# Patient Record
Sex: FEMALE | Race: WHITE | Employment: FULL TIME | URBAN - METROPOLITAN AREA
[De-identification: names, ages, dates, MRNs, and addresses within clinical notes are randomized per-mention and may not be internally consistent; named-entity substitution may affect disease eponyms.]

---

## 2017-02-13 ENCOUNTER — OFFICE VISIT (OUTPATIENT)
Dept: FAMILY MEDICINE CLINIC | Age: 42
End: 2017-02-13

## 2017-02-13 VITALS
SYSTOLIC BLOOD PRESSURE: 102 MMHG | OXYGEN SATURATION: 97 % | RESPIRATION RATE: 12 BRPM | HEIGHT: 68 IN | DIASTOLIC BLOOD PRESSURE: 60 MMHG | TEMPERATURE: 97.9 F | WEIGHT: 198 LBS | HEART RATE: 76 BPM | BODY MASS INDEX: 30.01 KG/M2

## 2017-02-13 DIAGNOSIS — J01.90 ACUTE RHINOSINUSITIS: Primary | ICD-10-CM

## 2017-02-13 DIAGNOSIS — J40 BRONCHITIS: ICD-10-CM

## 2017-02-13 PROCEDURE — 1036F TOBACCO NON-USER: CPT | Performed by: FAMILY MEDICINE

## 2017-02-13 PROCEDURE — G8484 FLU IMMUNIZE NO ADMIN: HCPCS | Performed by: FAMILY MEDICINE

## 2017-02-13 PROCEDURE — G8417 CALC BMI ABV UP PARAM F/U: HCPCS | Performed by: FAMILY MEDICINE

## 2017-02-13 PROCEDURE — 99213 OFFICE O/P EST LOW 20 MIN: CPT | Performed by: FAMILY MEDICINE

## 2017-02-13 PROCEDURE — G8427 DOCREV CUR MEDS BY ELIG CLIN: HCPCS | Performed by: FAMILY MEDICINE

## 2017-02-13 RX ORDER — DOXYCYCLINE HYCLATE 100 MG
100 TABLET ORAL 2 TIMES DAILY
Qty: 14 TABLET | Refills: 0 | Status: SHIPPED | OUTPATIENT
Start: 2017-02-13 | End: 2017-02-20

## 2017-02-13 RX ORDER — HYDROCODONE POLISTIREX AND CHLORPHENIRAMINE POLISTIREX 10; 8 MG/5ML; MG/5ML
5 SUSPENSION, EXTENDED RELEASE ORAL EVERY 12 HOURS PRN
Qty: 115 ML | Refills: 0 | Status: SHIPPED | OUTPATIENT
Start: 2017-02-13 | End: 2017-09-25 | Stop reason: ALTCHOICE

## 2017-02-13 ASSESSMENT — ENCOUNTER SYMPTOMS
SINUS PRESSURE: 1
DIARRHEA: 0
VOMITING: 0
NAUSEA: 0
VOICE CHANGE: 1
WHEEZING: 0
RHINORRHEA: 1
SHORTNESS OF BREATH: 0
COUGH: 1
SORE THROAT: 1
GASTROINTESTINAL NEGATIVE: 1

## 2017-03-09 ENCOUNTER — OFFICE VISIT (OUTPATIENT)
Dept: FAMILY MEDICINE CLINIC | Age: 42
End: 2017-03-09

## 2017-03-09 VITALS
DIASTOLIC BLOOD PRESSURE: 64 MMHG | RESPIRATION RATE: 12 BRPM | WEIGHT: 200.2 LBS | HEART RATE: 68 BPM | HEIGHT: 68 IN | SYSTOLIC BLOOD PRESSURE: 112 MMHG | BODY MASS INDEX: 30.34 KG/M2

## 2017-03-09 DIAGNOSIS — E03.9 HYPOTHYROIDISM, UNSPECIFIED TYPE: ICD-10-CM

## 2017-03-09 DIAGNOSIS — H35.00 RETINOPATHY OF LEFT EYE: ICD-10-CM

## 2017-03-09 DIAGNOSIS — M25.512 LEFT SHOULDER PAIN, UNSPECIFIED CHRONICITY: ICD-10-CM

## 2017-03-09 DIAGNOSIS — R80.9 MICROALBUMINURIA: ICD-10-CM

## 2017-03-09 DIAGNOSIS — K59.09 CHRONIC CONSTIPATION: ICD-10-CM

## 2017-03-09 DIAGNOSIS — R11.0 NAUSEA: ICD-10-CM

## 2017-03-09 PROCEDURE — G8427 DOCREV CUR MEDS BY ELIG CLIN: HCPCS | Performed by: FAMILY MEDICINE

## 2017-03-09 PROCEDURE — 3045F PR MOST RECENT HEMOGLOBIN A1C LEVEL 7.0-9.0%: CPT | Performed by: FAMILY MEDICINE

## 2017-03-09 PROCEDURE — G8417 CALC BMI ABV UP PARAM F/U: HCPCS | Performed by: FAMILY MEDICINE

## 2017-03-09 PROCEDURE — 99214 OFFICE O/P EST MOD 30 MIN: CPT | Performed by: FAMILY MEDICINE

## 2017-03-09 PROCEDURE — 1036F TOBACCO NON-USER: CPT | Performed by: FAMILY MEDICINE

## 2017-03-09 PROCEDURE — G8484 FLU IMMUNIZE NO ADMIN: HCPCS | Performed by: FAMILY MEDICINE

## 2017-03-09 RX ORDER — ONDANSETRON 4 MG/1
4 TABLET, FILM COATED ORAL EVERY 8 HOURS PRN
Qty: 30 TABLET | Refills: 0 | Status: SHIPPED | OUTPATIENT
Start: 2017-03-09 | End: 2017-09-25 | Stop reason: ALTCHOICE

## 2017-03-09 RX ORDER — CELECOXIB 200 MG/1
200 CAPSULE ORAL DAILY
Qty: 30 CAPSULE | Refills: 11 | Status: SHIPPED | OUTPATIENT
Start: 2017-03-09 | End: 2018-12-06 | Stop reason: ALTCHOICE

## 2017-03-09 RX ORDER — SODIUM CHLORIDE/ALOE VERA
GEL (GRAM) NASAL PRN
Qty: 1 TUBE | Refills: 3 | Status: SHIPPED | OUTPATIENT
Start: 2017-03-09 | End: 2018-01-09

## 2017-03-09 ASSESSMENT — ENCOUNTER SYMPTOMS
GASTROINTESTINAL NEGATIVE: 1
RESPIRATORY NEGATIVE: 1

## 2017-06-07 ENCOUNTER — TELEPHONE (OUTPATIENT)
Dept: FAMILY MEDICINE CLINIC | Age: 42
End: 2017-06-07

## 2017-06-09 LAB
AVERAGE GLUCOSE: 186 MG/DL (ref 66–114)
HBA1C MFR BLD: 8.1 % (ref 4.2–5.8)

## 2017-06-12 ENCOUNTER — OFFICE VISIT (OUTPATIENT)
Dept: FAMILY MEDICINE CLINIC | Age: 42
End: 2017-06-12

## 2017-06-12 VITALS
RESPIRATION RATE: 12 BRPM | BODY MASS INDEX: 30.92 KG/M2 | SYSTOLIC BLOOD PRESSURE: 128 MMHG | WEIGHT: 204 LBS | HEIGHT: 68 IN | HEART RATE: 68 BPM | DIASTOLIC BLOOD PRESSURE: 74 MMHG

## 2017-06-12 DIAGNOSIS — E03.8 OTHER SPECIFIED HYPOTHYROIDISM: ICD-10-CM

## 2017-06-12 DIAGNOSIS — R80.9 MICROALBUMINURIA: ICD-10-CM

## 2017-06-12 DIAGNOSIS — E66.9 OBESITY (BMI 30-39.9): ICD-10-CM

## 2017-06-12 PROCEDURE — 99214 OFFICE O/P EST MOD 30 MIN: CPT | Performed by: FAMILY MEDICINE

## 2017-06-12 PROCEDURE — 3046F HEMOGLOBIN A1C LEVEL >9.0%: CPT | Performed by: FAMILY MEDICINE

## 2017-06-12 PROCEDURE — G8417 CALC BMI ABV UP PARAM F/U: HCPCS | Performed by: FAMILY MEDICINE

## 2017-06-12 PROCEDURE — G8427 DOCREV CUR MEDS BY ELIG CLIN: HCPCS | Performed by: FAMILY MEDICINE

## 2017-06-12 PROCEDURE — 1036F TOBACCO NON-USER: CPT | Performed by: FAMILY MEDICINE

## 2017-06-12 ASSESSMENT — PATIENT HEALTH QUESTIONNAIRE - PHQ9
SUM OF ALL RESPONSES TO PHQ QUESTIONS 1-9: 0
2. FEELING DOWN, DEPRESSED OR HOPELESS: 0
1. LITTLE INTEREST OR PLEASURE IN DOING THINGS: 0
SUM OF ALL RESPONSES TO PHQ9 QUESTIONS 1 & 2: 0

## 2017-06-12 ASSESSMENT — ENCOUNTER SYMPTOMS
RESPIRATORY NEGATIVE: 1
GASTROINTESTINAL NEGATIVE: 1

## 2017-06-15 ENCOUNTER — OFFICE VISIT (OUTPATIENT)
Dept: OTHER | Age: 42
End: 2017-06-15

## 2017-06-15 VITALS — WEIGHT: 203 LBS | BODY MASS INDEX: 30.87 KG/M2

## 2017-06-15 DIAGNOSIS — E10.3393 TYPE 1 DIABETES MELLITUS WITH MODERATE NONPROLIFERATIVE RETINOPATHY OF BOTH EYES WITHOUT MACULAR EDEMA (HCC): Primary | ICD-10-CM

## 2017-09-06 ENCOUNTER — TELEPHONE (OUTPATIENT)
Dept: FAMILY MEDICINE CLINIC | Age: 42
End: 2017-09-06

## 2017-09-19 ENCOUNTER — TELEPHONE (OUTPATIENT)
Dept: FAMILY MEDICINE CLINIC | Age: 42
End: 2017-09-19

## 2017-09-23 LAB
AVERAGE GLUCOSE: 166 MG/DL (ref 66–114)
HBA1C MFR BLD: 7.4 % (ref 4.2–5.8)

## 2017-09-25 ENCOUNTER — OFFICE VISIT (OUTPATIENT)
Dept: FAMILY MEDICINE CLINIC | Age: 42
End: 2017-09-25
Payer: COMMERCIAL

## 2017-09-25 VITALS
WEIGHT: 202.8 LBS | SYSTOLIC BLOOD PRESSURE: 120 MMHG | HEART RATE: 91 BPM | DIASTOLIC BLOOD PRESSURE: 74 MMHG | BODY MASS INDEX: 30.74 KG/M2 | OXYGEN SATURATION: 99 % | HEIGHT: 68 IN | RESPIRATION RATE: 13 BRPM

## 2017-09-25 DIAGNOSIS — R80.9 MICROALBUMINURIA: ICD-10-CM

## 2017-09-25 DIAGNOSIS — Z23 NEED FOR INFLUENZA VACCINATION: ICD-10-CM

## 2017-09-25 DIAGNOSIS — K59.09 CHRONIC CONSTIPATION: ICD-10-CM

## 2017-09-25 DIAGNOSIS — E03.9 HYPOTHYROIDISM, UNSPECIFIED TYPE: ICD-10-CM

## 2017-09-25 PROCEDURE — 1036F TOBACCO NON-USER: CPT | Performed by: FAMILY MEDICINE

## 2017-09-25 PROCEDURE — 3046F HEMOGLOBIN A1C LEVEL >9.0%: CPT | Performed by: FAMILY MEDICINE

## 2017-09-25 PROCEDURE — 99214 OFFICE O/P EST MOD 30 MIN: CPT | Performed by: FAMILY MEDICINE

## 2017-09-25 PROCEDURE — G8417 CALC BMI ABV UP PARAM F/U: HCPCS | Performed by: FAMILY MEDICINE

## 2017-09-25 PROCEDURE — G8427 DOCREV CUR MEDS BY ELIG CLIN: HCPCS | Performed by: FAMILY MEDICINE

## 2017-09-25 PROCEDURE — 90688 IIV4 VACCINE SPLT 0.5 ML IM: CPT | Performed by: FAMILY MEDICINE

## 2017-09-25 PROCEDURE — 90471 IMMUNIZATION ADMIN: CPT | Performed by: FAMILY MEDICINE

## 2017-09-25 RX ORDER — LISINOPRIL 2.5 MG/1
2.5 TABLET ORAL DAILY
Qty: 90 TABLET | Refills: 3 | Status: SHIPPED | OUTPATIENT
Start: 2017-09-25 | End: 2018-12-06 | Stop reason: SDUPTHER

## 2017-09-25 ASSESSMENT — ENCOUNTER SYMPTOMS
RESPIRATORY NEGATIVE: 1
GASTROINTESTINAL NEGATIVE: 1

## 2017-09-28 ENCOUNTER — HOSPITAL ENCOUNTER (OUTPATIENT)
Dept: PHARMACY | Age: 42
Setting detail: THERAPIES SERIES
Discharge: HOME | End: 2017-09-28
Payer: COMMERCIAL

## 2017-09-28 VITALS — BODY MASS INDEX: 30.79 KG/M2 | WEIGHT: 203.13 LBS

## 2017-09-28 PROCEDURE — G0108 DIAB MANAGE TRN  PER INDIV: HCPCS | Performed by: REGISTERED NURSE

## 2017-12-19 ENCOUNTER — TELEPHONE (OUTPATIENT)
Dept: FAMILY MEDICINE CLINIC | Age: 42
End: 2017-12-19

## 2018-01-03 LAB
AVERAGE GLUCOSE: 189 MG/DL (ref 66–114)
CREATINE, URINE: 133.1 MG/DL
HBA1C MFR BLD: 8.2 % (ref 4.2–5.8)
MICROALBUMIN/CREAT 24H UR: <0.7 MG/DL
T4 FREE: 0.91 NG/DL (ref 0.8–1.8)
TSH SERPL DL<=0.05 MIU/L-ACNC: 5.39 UIU/ML (ref 0.4–4.4)

## 2018-01-09 ENCOUNTER — OFFICE VISIT (OUTPATIENT)
Dept: FAMILY MEDICINE CLINIC | Age: 43
End: 2018-01-09
Payer: COMMERCIAL

## 2018-01-09 VITALS
RESPIRATION RATE: 14 BRPM | BODY MASS INDEX: 30.92 KG/M2 | OXYGEN SATURATION: 98 % | HEIGHT: 68 IN | WEIGHT: 204 LBS | SYSTOLIC BLOOD PRESSURE: 114 MMHG | HEART RATE: 86 BPM | DIASTOLIC BLOOD PRESSURE: 60 MMHG

## 2018-01-09 DIAGNOSIS — E03.9 HYPOTHYROIDISM, UNSPECIFIED TYPE: ICD-10-CM

## 2018-01-09 DIAGNOSIS — E66.9 OBESITY (BMI 30-39.9): ICD-10-CM

## 2018-01-09 PROCEDURE — 99214 OFFICE O/P EST MOD 30 MIN: CPT | Performed by: FAMILY MEDICINE

## 2018-01-09 PROCEDURE — 1036F TOBACCO NON-USER: CPT | Performed by: FAMILY MEDICINE

## 2018-01-09 PROCEDURE — G8484 FLU IMMUNIZE NO ADMIN: HCPCS | Performed by: FAMILY MEDICINE

## 2018-01-09 PROCEDURE — 3045F PR MOST RECENT HEMOGLOBIN A1C LEVEL 7.0-9.0%: CPT | Performed by: FAMILY MEDICINE

## 2018-01-09 PROCEDURE — G8427 DOCREV CUR MEDS BY ELIG CLIN: HCPCS | Performed by: FAMILY MEDICINE

## 2018-01-09 PROCEDURE — G8417 CALC BMI ABV UP PARAM F/U: HCPCS | Performed by: FAMILY MEDICINE

## 2018-01-09 RX ORDER — LEVOTHYROXINE SODIUM 200 MCG
200 TABLET ORAL DAILY
Qty: 30 TABLET | Refills: 2 | Status: SHIPPED | OUTPATIENT
Start: 2018-01-09 | End: 2019-06-07 | Stop reason: SDUPTHER

## 2018-01-09 ASSESSMENT — ENCOUNTER SYMPTOMS
GASTROINTESTINAL NEGATIVE: 1
RESPIRATORY NEGATIVE: 1

## 2018-01-09 NOTE — PROGRESS NOTES
Subjective:      Patient ID: Roger Neely is a 38 y.o. female. HPI:    Chief Complaint   Patient presents with    3 Month Follow-Up     DM     3 month eval.    Doing well overall. Sugars not well controlled. Lab Results   Component Value Date    LABA1C 8.2 (H) 2018     BP well controlled. BP Readings from Last 3 Encounters:   18 114/60   17 120/74   17 128/74     Weight is up a few lbs. Wt Readings from Last 3 Encounters:   18 204 lb (92.5 kg)   17 203 lb 2 oz (92.1 kg)   17 202 lb 12.8 oz (92 kg)     Patient Active Problem List   Diagnosis    Diabetes mellitus type 1, uncontrolled ([facility]    Hypothyroid    Retinopathy of left eye    Microalbuminuria    Chronic constipation    Controlled type 1 diabetes mellitus without complication, with long-term current use of insulin ([facility]    Uncontrolled type 1 diabetes mellitus without complication [facility])     Past Surgical History:   Procedure Laterality Date     SECTION  ,     CHOLECYSTECTOMY  2010    ENDOMETRIAL ABLATION  2008    HYSTERECTOMY      PARTIAL HYSTERECTOMY  4/14/15    Dr. Aylin Sosa ovary present bilateral     SHOULDER SURGERY Left 2017    TUBAL LIGATION       Prior to Admission medications    Medication Sig Start Date End Date Taking? Authorizing Provider   insulin lispro (HUMALOG) 100 UNIT/ML injection vial PER PUMP SETTINGS. -120 UNITS DAILY 17  Yes Freddie Carmichael DO   glucose blood VI test strips (SAMMI CONTOUR NEXT TEST) strip Patient is to check blood sugar 4 times per day.   Dx: 250.01 17  Yes Freddie Carmichael DO   lisinopril (ZESTRIL) 2.5 MG tablet Take 1 tablet by mouth daily 17  Yes Freddie Carmichael DO   celecoxib (CELEBREX) 200 MG capsule Take 1 capsule by mouth daily 3/9/17  Yes Freddie Carmichael DO   SYNTHROID 175 MCG tablet Take 1 tablet by mouth daily Take with water on an empty stomach- wait 30 minutes before eating

## 2018-01-09 NOTE — PROGRESS NOTES
Visit Information    Have you changed or started any medications since your last visit including any over-the-counter medicines, vitamins, or herbal medicines? no   Have you stopped taking any of your medications? Is so, why? -  no  Are you having any side effects from any of your medications? - no    Have you seen any other physician or provider since your last visit?  no   Have you had any other diagnostic tests since your last visit? yes - labs   Have you been seen in the emergency room and/or had an admission in a hospital since we last saw you?  no   Have you had your routine dental cleaning in the past 6 months?  yes - Dr Yen Bourgeois     Do you have an active [employer] account? If no, what is the barrier?   Yes    Patient Care Team:  Ochoa Agrawal DO as PCP - General (Family Medicine)  Lula Lemon MD as Consulting Physician (Endocrinology)    Medical History Review  Past Medical, Family, and Social History reviewed and does contribute to the patient presenting condition    Health Maintenance   Topic Date Due    HIV screen  03/02/1988    DTaP/Tdap/Td vaccine (1 - Tdap) 03/02/1992    Pneumococcal med risk (1 of 1 - PPSV23) 03/02/1992    Diabetic retinal exam  04/06/2018    Cervical cancer screen  07/22/2018    Diabetic foot exam  09/25/2018    A1C test (Diabetic or Prediabetic)  01/02/2019    Diabetic microalbuminuria test  01/02/2019    Lipid screen  01/02/2019    TSH testing  01/02/2019    Potassium monitoring  01/02/2019    Creatinine monitoring  01/02/2019    Flu vaccine  Completed

## 2018-01-10 LAB
ALBUMIN SERPL-MCNC: 4.2 G/DL (ref 3.2–5.3)
ALK PHOSPHATASE: 94 IU/L (ref 35–121)
ALT SERPL-CCNC: 22 IU/L (ref 5–59)
ANION GAP SERPL CALCULATED.3IONS-SCNC: 18 MMOL/L
AST SERPL-CCNC: 18 IU/L (ref 10–42)
BILIRUB SERPL-MCNC: 0.3 MG/DL (ref 0.2–1.3)
BUN BLDV-MCNC: 11 MG/DL (ref 10–20)
CALCIUM SERPL-MCNC: 9.5 MG/DL (ref 8.7–10.8)
CHLORIDE BLD-SCNC: 99 MMOL/L (ref 95–111)
CHOLESTEROL/HDL RATIO: 2.4
CHOLESTEROL: 167 MG/DL
CO2: 30 MMOL/L (ref 21–32)
CREAT SERPL-MCNC: 0.8 MG/DL (ref 0.5–1.3)
EGFR AFRICAN AMERICAN: 94
EGFR IF NONAFRICAN AMERICAN: 78
GLUCOSE: 170 MG/DL (ref 70–100)
HDLC SERPL-MCNC: 69 MG/DL (ref 40–60)
LDL CHOLESTEROL CALCULATED: 87 MG/DL
LDL/HDL RATIO: 1.3
POTASSIUM SERPL-SCNC: 4.6 MMOL/L (ref 3.5–5.4)
SODIUM BLD-SCNC: 130 MMOL/L (ref 134–147)
TOTAL PROTEIN: 7.4 G/DL (ref 5.8–8)
TRIGL SERPL-MCNC: 55 MG/DL
VLDLC SERPL CALC-MCNC: 11 MG/DL

## 2018-01-26 ENCOUNTER — TELEPHONE (OUTPATIENT)
Dept: FAMILY MEDICINE CLINIC | Age: 43
End: 2018-01-26

## 2018-01-26 NOTE — TELEPHONE ENCOUNTER
Pt returned call and says Novolog is covered by her insurance. If she does not hear back from the office she will check with her pharmacy this afternoon. Please advise.

## 2018-02-08 ENCOUNTER — OFFICE VISIT (OUTPATIENT)
Dept: FAMILY MEDICINE CLINIC | Age: 43
End: 2018-02-08
Payer: COMMERCIAL

## 2018-02-08 VITALS
HEIGHT: 68 IN | DIASTOLIC BLOOD PRESSURE: 58 MMHG | SYSTOLIC BLOOD PRESSURE: 104 MMHG | RESPIRATION RATE: 16 BRPM | HEART RATE: 88 BPM | WEIGHT: 199 LBS | BODY MASS INDEX: 30.16 KG/M2 | TEMPERATURE: 98.4 F

## 2018-02-08 DIAGNOSIS — J40 BRONCHITIS: ICD-10-CM

## 2018-02-08 DIAGNOSIS — J01.90 ACUTE RHINOSINUSITIS: Primary | ICD-10-CM

## 2018-02-08 PROCEDURE — G8417 CALC BMI ABV UP PARAM F/U: HCPCS | Performed by: FAMILY MEDICINE

## 2018-02-08 PROCEDURE — 1036F TOBACCO NON-USER: CPT | Performed by: FAMILY MEDICINE

## 2018-02-08 PROCEDURE — G8427 DOCREV CUR MEDS BY ELIG CLIN: HCPCS | Performed by: FAMILY MEDICINE

## 2018-02-08 PROCEDURE — 99213 OFFICE O/P EST LOW 20 MIN: CPT | Performed by: FAMILY MEDICINE

## 2018-02-08 PROCEDURE — G8484 FLU IMMUNIZE NO ADMIN: HCPCS | Performed by: FAMILY MEDICINE

## 2018-02-08 RX ORDER — HYDROCODONE POLISTIREX AND CHLORPHENIRAMINE POLISTIREX 10; 8 MG/5ML; MG/5ML
5 SUSPENSION, EXTENDED RELEASE ORAL EVERY 12 HOURS PRN
Qty: 115 ML | Refills: 0 | Status: SHIPPED | OUTPATIENT
Start: 2018-02-08 | End: 2018-02-15

## 2018-02-08 RX ORDER — DOXYCYCLINE HYCLATE 100 MG
100 TABLET ORAL 2 TIMES DAILY
Qty: 14 TABLET | Refills: 0 | Status: SHIPPED | OUTPATIENT
Start: 2018-02-08 | End: 2018-02-15

## 2018-02-08 RX ORDER — BENZONATATE 100 MG/1
100 CAPSULE ORAL 3 TIMES DAILY PRN
Qty: 21 CAPSULE | Refills: 0 | Status: SHIPPED | OUTPATIENT
Start: 2018-02-08 | End: 2018-02-15

## 2018-02-08 ASSESSMENT — ENCOUNTER SYMPTOMS
SINUS PRESSURE: 1
WHEEZING: 0
GASTROINTESTINAL NEGATIVE: 1
SORE THROAT: 1
SINUS PAIN: 1
COUGH: 1
RHINORRHEA: 1
SHORTNESS OF BREATH: 0

## 2018-03-29 ENCOUNTER — OFFICE VISIT (OUTPATIENT)
Dept: INTERNAL MEDICINE CLINIC | Age: 43
End: 2018-03-29
Payer: COMMERCIAL

## 2018-03-29 VITALS
HEIGHT: 68 IN | DIASTOLIC BLOOD PRESSURE: 70 MMHG | HEART RATE: 68 BPM | BODY MASS INDEX: 31.07 KG/M2 | SYSTOLIC BLOOD PRESSURE: 120 MMHG | WEIGHT: 205 LBS

## 2018-03-29 DIAGNOSIS — E66.9 OBESITY (BMI 30.0-34.9): ICD-10-CM

## 2018-03-29 DIAGNOSIS — E03.9 HYPOTHYROIDISM, UNSPECIFIED TYPE: ICD-10-CM

## 2018-03-29 DIAGNOSIS — H35.00 RETINOPATHY OF LEFT EYE: ICD-10-CM

## 2018-03-29 PROCEDURE — 1036F TOBACCO NON-USER: CPT | Performed by: INTERNAL MEDICINE

## 2018-03-29 PROCEDURE — G8427 DOCREV CUR MEDS BY ELIG CLIN: HCPCS | Performed by: INTERNAL MEDICINE

## 2018-03-29 PROCEDURE — 99214 OFFICE O/P EST MOD 30 MIN: CPT | Performed by: INTERNAL MEDICINE

## 2018-03-29 PROCEDURE — G8417 CALC BMI ABV UP PARAM F/U: HCPCS | Performed by: INTERNAL MEDICINE

## 2018-03-29 PROCEDURE — G8482 FLU IMMUNIZE ORDER/ADMIN: HCPCS | Performed by: INTERNAL MEDICINE

## 2018-03-29 PROCEDURE — 3045F PR MOST RECENT HEMOGLOBIN A1C LEVEL 7.0-9.0%: CPT | Performed by: INTERNAL MEDICINE

## 2018-04-05 ENCOUNTER — TELEPHONE (OUTPATIENT)
Dept: FAMILY MEDICINE CLINIC | Age: 43
End: 2018-04-05

## 2018-04-07 LAB
AVERAGE GLUCOSE: 166 MG/DL (ref 66–114)
HBA1C MFR BLD: 7.4 % (ref 4.2–5.8)
TSH SERPL DL<=0.05 MIU/L-ACNC: 2.65 UIU/ML (ref 0.4–4.1)

## 2018-04-09 ENCOUNTER — OFFICE VISIT (OUTPATIENT)
Dept: FAMILY MEDICINE CLINIC | Age: 43
End: 2018-04-09
Payer: COMMERCIAL

## 2018-04-09 VITALS
HEIGHT: 68 IN | BODY MASS INDEX: 30.58 KG/M2 | DIASTOLIC BLOOD PRESSURE: 72 MMHG | SYSTOLIC BLOOD PRESSURE: 120 MMHG | WEIGHT: 201.8 LBS | RESPIRATION RATE: 16 BRPM | HEART RATE: 76 BPM

## 2018-04-09 DIAGNOSIS — E66.9 OBESITY (BMI 30-39.9): ICD-10-CM

## 2018-04-09 DIAGNOSIS — E03.9 HYPOTHYROIDISM, UNSPECIFIED TYPE: ICD-10-CM

## 2018-04-09 DIAGNOSIS — G47.00 INSOMNIA, UNSPECIFIED TYPE: ICD-10-CM

## 2018-04-09 DIAGNOSIS — R80.9 MICROALBUMINURIA: ICD-10-CM

## 2018-04-09 PROCEDURE — 1036F TOBACCO NON-USER: CPT | Performed by: FAMILY MEDICINE

## 2018-04-09 PROCEDURE — G8417 CALC BMI ABV UP PARAM F/U: HCPCS | Performed by: FAMILY MEDICINE

## 2018-04-09 PROCEDURE — G8427 DOCREV CUR MEDS BY ELIG CLIN: HCPCS | Performed by: FAMILY MEDICINE

## 2018-04-09 PROCEDURE — 3045F PR MOST RECENT HEMOGLOBIN A1C LEVEL 7.0-9.0%: CPT | Performed by: FAMILY MEDICINE

## 2018-04-09 PROCEDURE — 99214 OFFICE O/P EST MOD 30 MIN: CPT | Performed by: FAMILY MEDICINE

## 2018-04-09 RX ORDER — ZOLPIDEM TARTRATE 10 MG/1
10 TABLET ORAL NIGHTLY PRN
Qty: 30 TABLET | Refills: 2 | Status: SHIPPED | OUTPATIENT
Start: 2018-04-09 | End: 2018-07-08

## 2018-04-09 ASSESSMENT — ENCOUNTER SYMPTOMS
GASTROINTESTINAL NEGATIVE: 1
RESPIRATORY NEGATIVE: 1

## 2018-04-11 ENCOUNTER — OFFICE VISIT (OUTPATIENT)
Dept: INTERNAL MEDICINE CLINIC | Age: 43
End: 2018-04-11
Payer: COMMERCIAL

## 2018-04-11 VITALS — WEIGHT: 201.13 LBS | BODY MASS INDEX: 30.58 KG/M2

## 2018-04-11 PROCEDURE — 99999 PR OFFICE/OUTPT VISIT,PROCEDURE ONLY: CPT | Performed by: NURSE PRACTITIONER

## 2018-04-11 PROCEDURE — G0108 DIAB MANAGE TRN  PER INDIV: HCPCS | Performed by: NURSE PRACTITIONER

## 2018-06-26 ENCOUNTER — TELEPHONE (OUTPATIENT)
Dept: FAMILY MEDICINE CLINIC | Age: 43
End: 2018-06-26

## 2018-07-03 ENCOUNTER — TELEPHONE (OUTPATIENT)
Dept: FAMILY MEDICINE CLINIC | Age: 43
End: 2018-07-03

## 2018-07-09 ENCOUNTER — OFFICE VISIT (OUTPATIENT)
Dept: FAMILY MEDICINE CLINIC | Age: 43
End: 2018-07-09
Payer: COMMERCIAL

## 2018-07-09 VITALS
BODY MASS INDEX: 30.11 KG/M2 | HEART RATE: 80 BPM | RESPIRATION RATE: 16 BRPM | DIASTOLIC BLOOD PRESSURE: 76 MMHG | HEIGHT: 68 IN | SYSTOLIC BLOOD PRESSURE: 122 MMHG | WEIGHT: 198.7 LBS

## 2018-07-09 DIAGNOSIS — R80.9 MICROALBUMINURIA: ICD-10-CM

## 2018-07-09 DIAGNOSIS — E66.9 OBESITY (BMI 30-39.9): ICD-10-CM

## 2018-07-09 DIAGNOSIS — E03.9 HYPOTHYROIDISM, UNSPECIFIED TYPE: ICD-10-CM

## 2018-07-09 PROCEDURE — 1036F TOBACCO NON-USER: CPT | Performed by: FAMILY MEDICINE

## 2018-07-09 PROCEDURE — 3045F PR MOST RECENT HEMOGLOBIN A1C LEVEL 7.0-9.0%: CPT | Performed by: FAMILY MEDICINE

## 2018-07-09 PROCEDURE — 2022F DILAT RTA XM EVC RTNOPTHY: CPT | Performed by: FAMILY MEDICINE

## 2018-07-09 PROCEDURE — 99214 OFFICE O/P EST MOD 30 MIN: CPT | Performed by: FAMILY MEDICINE

## 2018-07-09 PROCEDURE — G8427 DOCREV CUR MEDS BY ELIG CLIN: HCPCS | Performed by: FAMILY MEDICINE

## 2018-07-09 PROCEDURE — G8417 CALC BMI ABV UP PARAM F/U: HCPCS | Performed by: FAMILY MEDICINE

## 2018-07-09 RX ORDER — PHENTERMINE HYDROCHLORIDE 37.5 MG/1
37.5 TABLET ORAL
Qty: 30 TABLET | Refills: 0 | Status: SHIPPED | OUTPATIENT
Start: 2018-07-09 | End: 2018-08-08 | Stop reason: SDUPTHER

## 2018-07-09 ASSESSMENT — ENCOUNTER SYMPTOMS
RESPIRATORY NEGATIVE: 1
GASTROINTESTINAL NEGATIVE: 1

## 2018-07-09 NOTE — PROGRESS NOTES
Subjective:      Patient ID: Gisselle Husbands is a 37 y.o. female. HPI :    Chief Complaint   Patient presents with    3 Month Follow-Up     [facility]    Diabetes     3 month eval.  Doing well overall. \"Feels great\". Had labs done this am.    Lab Results   Component Value Date    LABA1C 7.4 (H) 2018     BP well controlled. BP Readings from Last 3 Encounters:   18 122/76   18 120/72   18 120/70     Weight is down 3 lbs from last visit. She is eating very healthy and exercising on regular basis. Would like   Wt Readings from Last 3 Encounters:   18 198 lb 11.2 oz (90.1 kg)   18 201 lb 2 oz (91.2 kg)   18 201 lb 12.8 oz (91.5 kg)         Patient Active Problem List   Diagnosis    Diabetes mellitus type 1, uncontrolled ([address]    Hypothyroid    Retinopathy of left eye    Microalbuminuria    Chronic constipation    Controlled type 1 diabetes mellitus without complication, with long-term current use of insulin ([facility] [address] [facility]    Uncontrolled type 1 diabetes mellitus without complication [facility])     Past Surgical History:   Procedure Laterality Date     SECTION  , 2006    CHOLECYSTECTOMY  2010    ENDOMETRIAL ABLATION      HYSTERECTOMY      PARTIAL HYSTERECTOMY  4/14/15    Dr. Vargas Ou ovary present bilateral     SHOULDER SURGERY Left 2017    TUBAL LIGATION       Prior to Admission medications    Medication Sig Start Date End Date Taking? Authorizing Provider   HUMALOG 100 UNIT/ML injection vial USE AS DIRECTED PER PUMP SETTING, MAX -120 UNITS DAILY 18  Yes Jose M Peralta DO   blood glucose test strips (SAMMI CONTOUR NEXT TEST) strip Patient is to check blood sugar 4 times per day.   Dx: 250.01 18  Yes Jose M Pearlta, DO   HUMALOG 100 UNIT/ML injection vial  18  Yes Historical Provider, MD   SYNTHROID 200 MCG tablet Take 1 tablet by mouth daily Take with water on an empty stomach- wait 30 minutes before eating or taking other meds. 1/9/18  Yes Harini Cruz, DO   lisinopril (ZESTRIL) 2.5 MG tablet Take 1 tablet by mouth daily 9/25/17  Yes Harini Cruz, DO   celecoxib (CELEBREX) 200 MG capsule Take 1 capsule by mouth daily 3/9/17  Yes Harini Cruz, DO   vitamin D (ERGOCALCIFEROL) 85530 UNITS CAPS capsule Take 1 capsule by mouth Twice a Week 1/18/16  Yes Harini Cruz DO   Lancets MISC by Does not apply route. 5/8/12  Yes Deb Mcdonough MD       Review of Systems   Constitutional: Negative. HENT: Negative. Respiratory: Negative. Cardiovascular: Negative. Gastrointestinal: Negative. Musculoskeletal: Negative. All other systems reviewed and are negative. Objective:   Physical Exam   Constitutional: She is oriented to person, place, and time. She appears well-developed and well-nourished. HENT:   Head: Normocephalic and atraumatic. Right Ear: Tympanic membrane normal.   Left Ear: Tympanic membrane normal.   Mouth/Throat: Oropharynx is clear and moist and mucous membranes are normal.   Neck: Carotid bruit is not present. Cardiovascular: Normal rate, regular rhythm and normal heart sounds. No murmur heard. Pulmonary/Chest: Effort normal and breath sounds normal.   Abdominal: Soft. Bowel sounds are normal.   Musculoskeletal: She exhibits no edema. Neurological: She is alert and oriented to person, place, and time. Skin: Skin is warm and dry. Psychiatric: She has a normal mood and affect. Her behavior is normal.   Nursing note and vitals reviewed. Assessment:       Diagnosis Orders   1. Uncontrolled type 1 diabetes mellitus without complication (HCC)  insulin lispro (HUMALOG) 100 UNIT/ML injection vial    Hemoglobin A1C   2. Hypothyroidism, unspecified type     3. Microalbuminuria     4. Obesity (BMI 30-39. 9)             Plan:      -  Chronic medical problems stable  -  Continue current medications  -  Follow up with specialists as scheduled  -  Labs pending at time of visit, will call with results  -  Start Adipex, risks/benefits discussed  -  RTO 1 month

## 2018-07-09 NOTE — PATIENT INSTRUCTIONS
You may receive a survey about your visit with us today. The feedback from our patients helps us identify what is working well and where the service to all patients can be enhanced. Thank you! Patient Education        Learning About Diabetes Food Guidelines  Your Care Instructions    Meal planning is important to manage diabetes. It helps keep your blood sugar at a target level (which you set with your doctor). You don't have to eat special foods. You can eat what your family eats, including sweets once in a while. But you do have to pay attention to how often you eat and how much you eat of certain foods. You may want to work with a dietitian or a certified diabetes educator (CDE) to help you plan meals and snacks. A dietitian or CDE can also help you lose weight if that is one of your goals. What should you know about eating carbs? Managing the amount of carbohydrate (carbs) you eat is an important part of healthy meals when you have diabetes. Carbohydrate is found in many foods. · Learn which foods have carbs. And learn the amounts of carbs in different foods. ¨ Bread, cereal, pasta, and rice have about 15 grams of carbs in a serving. A serving is 1 slice of bread (1 ounce), ½ cup of cooked cereal, or 1/3 cup of cooked pasta or rice. ¨ Fruits have 15 grams of carbs in a serving. A serving is 1 small fresh fruit, such as an apple or orange; ½ of a banana; ½ cup of cooked or canned fruit; ½ cup of fruit juice; 1 cup of melon or raspberries; or 2 tablespoons of dried fruit. ¨ Milk and no-sugar-added yogurt have 15 grams of carbs in a serving. A serving is 1 cup of milk or 2/3 cup of no-sugar-added yogurt. ¨ Starchy vegetables have 15 grams of carbs in a serving. A serving is ½ cup of mashed potatoes or sweet potato; 1 cup winter squash; ½ of a small baked potato; ½ cup of cooked beans; or ½ cup cooked corn or green peas.   · Learn how much carbs to eat each day and at each meal. A dietitian or CDE can teach you how to keep track of the amount of carbs you eat. This is called carbohydrate counting. · If you are not sure how to count carbohydrate grams, use the Plate Method to plan meals. It is a good, quick way to make sure that you have a balanced meal. It also helps you spread carbs throughout the day. ¨ Divide your plate by types of foods. Put non-starchy vegetables on half the plate, meat or other protein food on one-quarter of the plate, and a grain or starchy vegetable in the final quarter of the plate. To this you can add a small piece of fruit and 1 cup of milk or yogurt, depending on how many carbs you are supposed to eat at a meal.  · Try to eat about the same amount of carbs at each meal. Do not \"save up\" your daily allowance of carbs to eat at one meal.  · Proteins have very little or no carbs per serving. Examples of proteins are beef, chicken, turkey, fish, eggs, tofu, cheese, cottage cheese, and peanut butter. A serving size of meat is 3 ounces, which is about the size of a deck of cards. Examples of meat substitute serving sizes (equal to 1 ounce of meat) are 1/4 cup of cottage cheese, 1 egg, 1 tablespoon of peanut butter, and ½ cup of tofu. How can you eat out and still eat healthy? · Learn to estimate the serving sizes of foods that have carbohydrate. If you measure food at home, it will be easier to estimate the amount in a serving of restaurant food. · If the meal you order has too much carbohydrate (such as potatoes, corn, or baked beans), ask to have a low-carbohydrate food instead. Ask for a salad or green vegetables. · If you use insulin, check your blood sugar before and after eating out to help you plan how much to eat in the future. · If you eat more carbohydrate at a meal than you had planned, take a walk or do other exercise. This will help lower your blood sugar. What else should you know? · Limit saturated fat, such as the fat from meat and dairy products.  This is a healthy choice because people who have diabetes are at higher risk of heart disease. So choose lean cuts of meat and nonfat or low-fat dairy products. Use olive or canola oil instead of butter or shortening when cooking. · Don't skip meals. Your blood sugar may drop too low if you skip meals and take insulin or certain medicines for diabetes. · Check with your doctor before you drink alcohol. Alcohol can cause your blood sugar to drop too low. Alcohol can also cause a bad reaction if you take certain diabetes medicines. Follow-up care is a key part of your treatment and safety. Be sure to make and go to all appointments, and call your doctor if you are having problems. It's also a good idea to know your test results and keep a list of the medicines you take. Where can you learn more? Go to [email or web address]://[email or web address] org and sign in to your [employer] account. Enter T022 in the Artsy box to learn more about \"Learning About Diabetes Food Guidelines. \"     If you do not have an account, please click on the \"Sign Up Now\" link. Current as of: December 7, 2017  Content Version: 11.6  © 6629-8081 [employer]. Care instructions adapted under license by [facility] ([facility]). If you have questions about a medical condition or this instruction, always ask your healthcare professional. [address] any warranty or liability for your use of this information.

## 2018-07-10 ENCOUNTER — TELEPHONE (OUTPATIENT)
Dept: FAMILY MEDICINE CLINIC | Age: 43
End: 2018-07-10

## 2018-07-10 LAB
AVERAGE GLUCOSE: 203 MG/DL (ref 66–114)
HBA1C MFR BLD: 8.7 % (ref 4.2–5.8)

## 2018-07-10 NOTE — TELEPHONE ENCOUNTER
LM for patient to call the office to inform. Humalog changed to Novolog due to insurance coverage. PA pending with [employer]Cover my meds on Contour Next Test Strips office will receive determination within 1-5 business days. Rico LOWRY informed spoke with Roberta Becker on medication change as well as test strip PA, voiced understanding.

## 2018-07-10 NOTE — TELEPHONE ENCOUNTER
[id number] fax received Humalog Insulin not on insurance formulary. Alternatives are FIASP or Novolog. Please advise.

## 2018-07-13 NOTE — TELEPHONE ENCOUNTER
We received a fax from [phone] stating the Contour Test Strips have been approved from 7/12/18-7/12/19. I phoned [address] [phone], left detailed vm regarding. Attempted to notify pt, left vm.

## 2018-07-31 ENCOUNTER — OFFICE VISIT (OUTPATIENT)
Dept: INTERNAL MEDICINE CLINIC | Age: 43
End: 2018-07-31
Payer: COMMERCIAL

## 2018-07-31 VITALS — WEIGHT: 198.13 LBS | BODY MASS INDEX: 30.12 KG/M2

## 2018-07-31 PROCEDURE — G0108 DIAB MANAGE TRN  PER INDIV: HCPCS | Performed by: NURSE PRACTITIONER

## 2018-07-31 PROCEDURE — 99999 PR OFFICE/OUTPT VISIT,PROCEDURE ONLY: CPT | Performed by: NURSE PRACTITIONER

## 2018-07-31 NOTE — PATIENT INSTRUCTIONS
1. Continue to strive to eat a scheduled breakfast and lunch, so you can bolus and get the benefit of any correction needed. We did set missed bolus reminder for breakfast and lunch under bolus to remind you to meter the BS and eat and take insulin as needed. 2.  Enter all BS in to the pump. Can use Capture Event to enter a BS from non linked meter. 3.  Change pump site every 3 days. 4.  Use sensor as much as you can. Calibrate more frequently on first day and make sure BS is steady when you do calibrate. 5.  Make sure you are getting 3-4 BS checks and boluses, as needed at those times. 6.  Upload in 2 weeks. Xenia will remind through email.   RN 3 months

## 2018-07-31 NOTE — PROGRESS NOTES
his diabetes. Suggestion given to set missed bolus reminder for breakfast and lunch so pump gives reminder to meter, eat, bolus. Has not been wearing cgm. This is also an important part of Aria's therapy and would benefit her greatly to wear, consistently. Reviewed sensor issues and goals. Due for eye exam.  Agreed to plan. DSME PLAN:   Discussed general issues about diabetes pathophysiology and management. Counseling at today's visit: discussed the need for weight loss, focused on the need for regular aerobic exercise, focused on the need to adhere to the prescribed ADA diet and reminded to check sugars regularly and to bring readings in at the time of the next visit. Meter download, medications, PMH and nursing assessment reviewed. Kourtney Younger states She is willing to participate in this plan of care and verbalized understanding of all instructions provided. Teach back used to verify comprehension. Total time involved in direct patient education: 60 minutes. 1. Continue to strive to eat a scheduled breakfast and lunch, so you can bolus and get the benefit of any correction needed. We did set missed bolus reminder for breakfast and lunch under bolus to remind you to meter the BS and eat and take insulin as needed. 2.  Enter all BS in to the pump. Can use Capture Event to enter a BS from non linked meter. 3.  Change pump site every 3 days. 4.  Use sensor as much as you can. Calibrate more frequently on first day and make sure BS is steady when you do calibrate. 5.  Make sure you are getting 3-4 BS checks and boluses, as needed at those times. 6.  Upload in 2 weeks. Xenia will remind through email.   RN 3 months

## 2018-08-08 ENCOUNTER — OFFICE VISIT (OUTPATIENT)
Dept: FAMILY MEDICINE CLINIC | Age: 43
End: 2018-08-08
Payer: COMMERCIAL

## 2018-08-08 VITALS
BODY MASS INDEX: 30.87 KG/M2 | HEART RATE: 76 BPM | SYSTOLIC BLOOD PRESSURE: 116 MMHG | DIASTOLIC BLOOD PRESSURE: 72 MMHG | OXYGEN SATURATION: 96 % | RESPIRATION RATE: 16 BRPM | WEIGHT: 196.7 LBS | HEIGHT: 67 IN

## 2018-08-08 DIAGNOSIS — E66.9 OBESITY (BMI 30.0-34.9): ICD-10-CM

## 2018-08-08 PROCEDURE — 1036F TOBACCO NON-USER: CPT | Performed by: FAMILY MEDICINE

## 2018-08-08 PROCEDURE — 3045F PR MOST RECENT HEMOGLOBIN A1C LEVEL 7.0-9.0%: CPT | Performed by: FAMILY MEDICINE

## 2018-08-08 PROCEDURE — 99213 OFFICE O/P EST LOW 20 MIN: CPT | Performed by: FAMILY MEDICINE

## 2018-08-08 PROCEDURE — 2022F DILAT RTA XM EVC RTNOPTHY: CPT | Performed by: FAMILY MEDICINE

## 2018-08-08 PROCEDURE — G8417 CALC BMI ABV UP PARAM F/U: HCPCS | Performed by: FAMILY MEDICINE

## 2018-08-08 PROCEDURE — G8427 DOCREV CUR MEDS BY ELIG CLIN: HCPCS | Performed by: FAMILY MEDICINE

## 2018-08-08 RX ORDER — PHENTERMINE HYDROCHLORIDE 37.5 MG/1
37.5 TABLET ORAL
Qty: 30 TABLET | Refills: 0 | Status: SHIPPED | OUTPATIENT
Start: 2018-08-08 | End: 2018-09-07

## 2018-08-08 ASSESSMENT — ENCOUNTER SYMPTOMS
RESPIRATORY NEGATIVE: 1
GASTROINTESTINAL NEGATIVE: 1

## 2018-08-08 NOTE — PROGRESS NOTES
Subjective:      Patient ID: Cooper Ferguson is a 37 y.o. female. HPI:    Chief Complaint   Patient presents with    Obesity     adipex #1  lose of 2lbs     1 month eval.    Weight is down 2 lbs after the first month. Believes that her stress level is the issue. Not eating much, not sure why she is not losing. Wt Readings from Last 3 Encounters:   18 196 lb 11.2 oz (89.2 kg)   18 198 lb 2 oz (89.9 kg)   18 198 lb 11.2 oz (90.1 kg)     Sugars are still all over the place. Was seen at the diabetic center, checking more frequently. Patient Active Problem List   Diagnosis    Diabetes mellitus type 1, uncontrolled ([address]    Hypothyroid    Retinopathy of left eye    Microalbuminuria    Chronic constipation    Controlled type 1 diabetes mellitus without complication, with long-term current use of insulin ([facility] [address] [facility]    Uncontrolled type 1 diabetes mellitus without complication [facility])     Past Surgical History:   Procedure Laterality Date     SECTION  , 2006    CHOLECYSTECTOMY  2010    ENDOMETRIAL ABLATION      HYSTERECTOMY      PARTIAL HYSTERECTOMY  4/14/15    Dr. Xander Ceja ovary present bilateral     SHOULDER SURGERY Left 2017    TUBAL LIGATION       Prior to Admission medications    Medication Sig Start Date End Date Taking? Authorizing Provider   insulin aspart (NOVOLOG) 100 UNIT/ML injection vial USE AS DIRECTED PER PUMP SETTING, MAX -120 UNITS DAILY 7/10/18  Yes Harlan Munguia DO   phentermine (ADIPEX-P) 37.5 MG tablet Take 1 tablet by mouth every morning (before breakfast) for 30 days. . 18 Yes Harlan Munguia DO   blood glucose test strips (SAMMI CONTOUR NEXT TEST) strip Patient is to check blood sugar 4 times per day.   Dx: 250.01 18  Yes Harlan Munguia DO   HUMALOG 100 UNIT/ML injection vial  18  Yes Historical Provider, MD   SYNTHROID 200 MCG tablet Take 1 tablet by mouth daily Take with water on an empty stomach- wait 30 minutes before eating or taking other meds. 1/9/18  Yes Rodrigo Whiteside DO   lisinopril (ZESTRIL) 2.5 MG tablet Take 1 tablet by mouth daily 9/25/17  Yes Rodrigo Whiteside DO   celecoxib (CELEBREX) 200 MG capsule Take 1 capsule by mouth daily 3/9/17  Yes Rodrigo Whiteside DO   vitamin D (ERGOCALCIFEROL) 73112 UNITS CAPS capsule Take 1 capsule by mouth Twice a Week 1/18/16  Yes Rodrigo Whiteside DO   Lancets MISC by Does not apply route. 5/8/12  Yes Tal Mcdonough MD         Review of Systems   Constitutional: Negative. HENT: Negative. Respiratory: Negative. Cardiovascular: Negative. Gastrointestinal: Negative. Musculoskeletal: Negative. All other systems reviewed and are negative. Objective:   Physical Exam   Constitutional: She is oriented to person, place, and time. She appears well-developed and well-nourished. HENT:   Head: Normocephalic and atraumatic. Right Ear: Tympanic membrane normal.   Left Ear: Tympanic membrane normal.   Mouth/Throat: Oropharynx is clear and moist and mucous membranes are normal.   Cardiovascular: Normal rate, regular rhythm and normal heart sounds. No murmur heard. Pulmonary/Chest: Effort normal and breath sounds normal.   Abdominal: Soft. Bowel sounds are normal.   Musculoskeletal: She exhibits no edema. Neurological: She is alert and oriented to person, place, and time. Skin: Skin is warm and dry. Psychiatric: She has a normal mood and affect. Her behavior is normal.   Nursing note and vitals reviewed. Assessment:       Diagnosis Orders   1.  Uncontrolled type 1 diabetes mellitus without complication (HCC)     2. Obesity (BMI 30.0-34.9)  phentermine (ADIPEX-P) 37.5 MG tablet           Plan:      -  Pt down 2 lbs after month #1  -  Must increase exercise intensity  -  Refill sent  -  RTO 1 month        Rodrigo Whiteside DO

## 2018-09-06 ENCOUNTER — OFFICE VISIT (OUTPATIENT)
Dept: FAMILY MEDICINE CLINIC | Age: 43
End: 2018-09-06
Payer: COMMERCIAL

## 2018-09-06 VITALS
SYSTOLIC BLOOD PRESSURE: 122 MMHG | HEIGHT: 67 IN | BODY MASS INDEX: 31.09 KG/M2 | WEIGHT: 198.1 LBS | RESPIRATION RATE: 12 BRPM | HEART RATE: 72 BPM | DIASTOLIC BLOOD PRESSURE: 68 MMHG

## 2018-09-06 DIAGNOSIS — E66.9 OBESITY (BMI 30-39.9): ICD-10-CM

## 2018-09-06 PROCEDURE — 2022F DILAT RTA XM EVC RTNOPTHY: CPT | Performed by: FAMILY MEDICINE

## 2018-09-06 PROCEDURE — G8417 CALC BMI ABV UP PARAM F/U: HCPCS | Performed by: FAMILY MEDICINE

## 2018-09-06 PROCEDURE — 1036F TOBACCO NON-USER: CPT | Performed by: FAMILY MEDICINE

## 2018-09-06 PROCEDURE — 99213 OFFICE O/P EST LOW 20 MIN: CPT | Performed by: FAMILY MEDICINE

## 2018-09-06 PROCEDURE — G8427 DOCREV CUR MEDS BY ELIG CLIN: HCPCS | Performed by: FAMILY MEDICINE

## 2018-09-06 PROCEDURE — 3045F PR MOST RECENT HEMOGLOBIN A1C LEVEL 7.0-9.0%: CPT | Performed by: FAMILY MEDICINE

## 2018-09-06 RX ORDER — PHENTERMINE HYDROCHLORIDE 37.5 MG/1
37.5 TABLET ORAL
Qty: 30 TABLET | Refills: 0 | Status: SHIPPED | OUTPATIENT
Start: 2018-09-06 | End: 2018-10-06

## 2018-09-06 ASSESSMENT — ENCOUNTER SYMPTOMS
RESPIRATORY NEGATIVE: 1
GASTROINTESTINAL NEGATIVE: 1

## 2018-09-06 NOTE — PROGRESS NOTES
Visit Information    Have you changed or started any medications since your last visit including any over-the-counter medicines, vitamins, or herbal medicines? no   Are you having any side effects from any of your medications? -  no  Have you stopped taking any of your medications? Is so, why? -  no    Have you seen any other physician or provider since your last visit? No  Have you had any other diagnostic tests since your last visit? No  Have you been seen in the emergency room and/or had an admission to a hospital since we last saw you? No  Have you had your routine dental cleaning in the past 6 months? yes -      Have you activated your [employer] account? If not, what are your barriers?  Yes     Patient Care Team:  Shun Machado DO as PCP - General (Family Medicine)  Jack Faustin MD as Consulting Physician (Endocrinology)    Medical History Review  Past Medical, Family, and Social History reviewed and does contribute to the patient presenting condition    Health Maintenance   Topic Date Due    HIV screen  03/02/1988    DTaP/Tdap/Td vaccine (1 - Tdap) 03/02/1992    Pneumococcal med risk (1 of 1 - PPSV23) 03/02/1992    Diabetic retinal exam  04/06/2018    Cervical cancer screen  07/22/2018    Flu vaccine (1) 09/01/2018    Diabetic foot exam  09/25/2018    Diabetic microalbuminuria test  01/02/2019    Lipid screen  01/02/2019    Potassium monitoring  01/02/2019    Creatinine monitoring  01/02/2019    TSH testing  04/06/2019    A1C test (Diabetic or Prediabetic)  07/09/2019

## 2018-11-15 ENCOUNTER — OFFICE VISIT (OUTPATIENT)
Dept: INTERNAL MEDICINE CLINIC | Age: 43
End: 2018-11-15
Payer: COMMERCIAL

## 2018-11-15 VITALS — WEIGHT: 195.13 LBS | BODY MASS INDEX: 31.02 KG/M2

## 2018-11-15 DIAGNOSIS — E10.65 UNCONTROLLED TYPE 1 DIABETES MELLITUS WITH HYPERGLYCEMIA (HCC): ICD-10-CM

## 2018-11-15 PROCEDURE — 99999 PR OFFICE/OUTPT VISIT,PROCEDURE ONLY: CPT | Performed by: INTERNAL MEDICINE

## 2018-11-15 PROCEDURE — G0108 DIAB MANAGE TRN  PER INDIV: HCPCS | Performed by: INTERNAL MEDICINE

## 2018-12-06 ENCOUNTER — OFFICE VISIT (OUTPATIENT)
Dept: FAMILY MEDICINE CLINIC | Age: 43
End: 2018-12-06
Payer: COMMERCIAL

## 2018-12-06 VITALS
WEIGHT: 194.9 LBS | SYSTOLIC BLOOD PRESSURE: 122 MMHG | OXYGEN SATURATION: 96 % | DIASTOLIC BLOOD PRESSURE: 80 MMHG | RESPIRATION RATE: 16 BRPM | HEART RATE: 72 BPM | HEIGHT: 67 IN | BODY MASS INDEX: 30.59 KG/M2

## 2018-12-06 DIAGNOSIS — R80.9 MICROALBUMINURIA: ICD-10-CM

## 2018-12-06 DIAGNOSIS — Z23 NEED FOR INFLUENZA VACCINATION: ICD-10-CM

## 2018-12-06 DIAGNOSIS — E03.9 HYPOTHYROIDISM, UNSPECIFIED TYPE: ICD-10-CM

## 2018-12-06 PROCEDURE — 3045F PR MOST RECENT HEMOGLOBIN A1C LEVEL 7.0-9.0%: CPT | Performed by: FAMILY MEDICINE

## 2018-12-06 PROCEDURE — 99214 OFFICE O/P EST MOD 30 MIN: CPT | Performed by: FAMILY MEDICINE

## 2018-12-06 PROCEDURE — G8482 FLU IMMUNIZE ORDER/ADMIN: HCPCS | Performed by: FAMILY MEDICINE

## 2018-12-06 PROCEDURE — 2022F DILAT RTA XM EVC RTNOPTHY: CPT | Performed by: FAMILY MEDICINE

## 2018-12-06 PROCEDURE — G8427 DOCREV CUR MEDS BY ELIG CLIN: HCPCS | Performed by: FAMILY MEDICINE

## 2018-12-06 PROCEDURE — 90471 IMMUNIZATION ADMIN: CPT | Performed by: FAMILY MEDICINE

## 2018-12-06 PROCEDURE — 90688 IIV4 VACCINE SPLT 0.5 ML IM: CPT | Performed by: FAMILY MEDICINE

## 2018-12-06 PROCEDURE — G8417 CALC BMI ABV UP PARAM F/U: HCPCS | Performed by: FAMILY MEDICINE

## 2018-12-06 PROCEDURE — 1036F TOBACCO NON-USER: CPT | Performed by: FAMILY MEDICINE

## 2018-12-06 RX ORDER — FLASH GLUCOSE SCANNING READER
EACH MISCELLANEOUS
Qty: 1 DEVICE | Refills: 0 | Status: SHIPPED | OUTPATIENT
Start: 2018-12-06 | End: 2021-07-08

## 2018-12-06 RX ORDER — FLASH GLUCOSE SENSOR
KIT MISCELLANEOUS
Qty: 2 EACH | Refills: 11 | Status: SHIPPED | OUTPATIENT
Start: 2018-12-06 | End: 2021-07-08

## 2018-12-06 RX ORDER — LISINOPRIL 2.5 MG/1
2.5 TABLET ORAL DAILY
Qty: 90 TABLET | Refills: 3 | Status: SHIPPED | OUTPATIENT
Start: 2018-12-06 | End: 2021-03-09 | Stop reason: SDUPTHER

## 2018-12-06 ASSESSMENT — ENCOUNTER SYMPTOMS
GASTROINTESTINAL NEGATIVE: 1
RESPIRATORY NEGATIVE: 1

## 2018-12-06 NOTE — PROGRESS NOTES
Visit Information    Have you changed or started any medications since your last visit including any over-the-counter medicines, vitamins, or herbal medicines? no   Are you having any side effects from any of your medications? -  no  Have you stopped taking any of your medications? Is so, why? -  yes - medication list updated    Have you seen any other physician or provider since your last visit? No  Have you had any other diagnostic tests since your last visit? No  Have you been seen in the emergency room and/or had an admission to a hospital since we last saw you? No  Have you had your routine dental cleaning in the past 6 months? yes - Dr Nathalia Swann    Have you activated your [employer] account? If not, what are your barriers? Yes     Patient Care Team:  Liliane Morales DO as PCP - General (Family Medicine)  Jasmyne Escamilla MD as Consulting Physician (Endocrinology)    Medical History Review  Past Medical, Family, and Social History reviewed and does contribute to the patient presenting condition    Health Maintenance   Topic Date Due    HIV screen  03/02/1988    DTaP/Tdap/Td vaccine (1 - Tdap) 03/02/1992    Pneumococcal med risk (1 of 1 - PPSV23) 03/02/1992    Diabetic retinal exam  04/06/2018    Cervical cancer screen  07/22/2018    Flu vaccine (1) 09/01/2018    Diabetic foot exam  09/25/2018    Diabetic microalbuminuria test  01/02/2019    Lipid screen  01/02/2019    TSH testing  04/06/2019    A1C test (Diabetic or Prediabetic)  07/09/2019     Immunizations     Name Date Dose Route    Influenza, Quadv, 3 Years and older, IM (Fluzone 3 yrs and older or Afluria 5 yrs and older) 12/6/2018 0.5 mL Intramuscular    Site: Deltoid- Left    Lot: Teresa Cruz    NDC: 94442-703-60      After obtaining consent, and per orders of Dr. Caroline Arevalo, injection of flu given in Left deltoid by Xena Puckett CMA ([facility]). Patient tolerated well.

## 2018-12-12 ENCOUNTER — TELEPHONE (OUTPATIENT)
Dept: INTERNAL MEDICINE CLINIC | Age: 43
End: 2018-12-12

## 2019-02-25 ENCOUNTER — TELEPHONE (OUTPATIENT)
Dept: FAMILY MEDICINE CLINIC | Age: 44
End: 2019-02-25

## 2019-03-07 ENCOUNTER — OFFICE VISIT (OUTPATIENT)
Dept: FAMILY MEDICINE CLINIC | Age: 44
End: 2019-03-07
Payer: COMMERCIAL

## 2019-03-07 VITALS
HEIGHT: 67 IN | SYSTOLIC BLOOD PRESSURE: 128 MMHG | DIASTOLIC BLOOD PRESSURE: 70 MMHG | BODY MASS INDEX: 32.72 KG/M2 | WEIGHT: 208.5 LBS | RESPIRATION RATE: 16 BRPM | HEART RATE: 72 BPM

## 2019-03-07 DIAGNOSIS — E10.65 UNCONTROLLED TYPE 1 DIABETES MELLITUS WITH HYPERGLYCEMIA (HCC): Primary | ICD-10-CM

## 2019-03-07 DIAGNOSIS — R80.9 MICROALBUMINURIA: ICD-10-CM

## 2019-03-07 DIAGNOSIS — E03.9 HYPOTHYROIDISM, UNSPECIFIED TYPE: ICD-10-CM

## 2019-03-07 DIAGNOSIS — E66.9 OBESITY (BMI 30-39.9): ICD-10-CM

## 2019-03-07 LAB
ALBUMIN SERPL-MCNC: 4.2 G/DL (ref 3.5–5.2)
ALK PHOSPHATASE: 101 U/L (ref 30–101)
ALT SERPL-CCNC: 40 U/L (ref 5–40)
ANION GAP SERPL CALCULATED.3IONS-SCNC: 12 MEQ/L (ref 10–19)
AST SERPL-CCNC: 38 U/L (ref 9–40)
AVERAGE GLUCOSE: 163 MG/DL (ref 66–114)
BILIRUB SERPL-MCNC: 0.4 MG/DL
BUN BLDV-MCNC: 16 MG/DL (ref 8–23)
CALCIUM SERPL-MCNC: 9.6 MG/DL (ref 8.5–10.5)
CHLORIDE BLD-SCNC: 105 MEQ/L (ref 95–107)
CHOLESTEROL/HDL RATIO: 2
CHOLESTEROL: 160 MG/DL
CO2: 28 MEQ/L (ref 19–31)
CREAT SERPL-MCNC: 0.7 MG/DL (ref 0.6–1.3)
CREATINE, URINE: 96.5 MG/DL (ref 28–217)
EGFR AFRICAN AMERICAN: 120.4 ML/MIN/1.73 M2
EGFR IF NONAFRICAN AMERICAN: 103.9 ML/MIN/1.73 M2
GLUCOSE: 47 MG/DL (ref 70–99)
HBA1C MFR BLD: 7.3 %
HDLC SERPL-MCNC: 78.8 MG/DL
LDL CHOLESTEROL CALCULATED: 70 MG/DL
LDL/HDL RATIO: 0.9
MICROALBUMIN/CREAT 24H UR: <1.2 MG/DL (ref 1.2–40)
MICROALBUMIN/CREAT UR-RTO: ABNORMAL MG/G
POTASSIUM SERPL-SCNC: 5.6 MEQ/L (ref 3.5–5.4)
SODIUM BLD-SCNC: 145 MEQ/L (ref 135–146)
TOTAL PROTEIN: 7.3 G/DL (ref 6.1–8.3)
TRIGL SERPL-MCNC: 54 MG/DL
TSH SERPL DL<=0.05 MIU/L-ACNC: 6.24 UIU/ML (ref 0.4–4.1)
VLDLC SERPL CALC-MCNC: 11 MG/DL

## 2019-03-07 PROCEDURE — 3045F PR MOST RECENT HEMOGLOBIN A1C LEVEL 7.0-9.0%: CPT | Performed by: FAMILY MEDICINE

## 2019-03-07 PROCEDURE — 2022F DILAT RTA XM EVC RTNOPTHY: CPT | Performed by: FAMILY MEDICINE

## 2019-03-07 PROCEDURE — 1036F TOBACCO NON-USER: CPT | Performed by: FAMILY MEDICINE

## 2019-03-07 PROCEDURE — 99214 OFFICE O/P EST MOD 30 MIN: CPT | Performed by: FAMILY MEDICINE

## 2019-03-07 PROCEDURE — G8482 FLU IMMUNIZE ORDER/ADMIN: HCPCS | Performed by: FAMILY MEDICINE

## 2019-03-07 PROCEDURE — G8417 CALC BMI ABV UP PARAM F/U: HCPCS | Performed by: FAMILY MEDICINE

## 2019-03-07 PROCEDURE — G8427 DOCREV CUR MEDS BY ELIG CLIN: HCPCS | Performed by: FAMILY MEDICINE

## 2019-03-07 ASSESSMENT — PATIENT HEALTH QUESTIONNAIRE - PHQ9
2. FEELING DOWN, DEPRESSED OR HOPELESS: 0
1. LITTLE INTEREST OR PLEASURE IN DOING THINGS: 0
SUM OF ALL RESPONSES TO PHQ9 QUESTIONS 1 & 2: 0
SUM OF ALL RESPONSES TO PHQ QUESTIONS 1-9: 0
SUM OF ALL RESPONSES TO PHQ QUESTIONS 1-9: 0

## 2019-03-07 ASSESSMENT — ENCOUNTER SYMPTOMS
RESPIRATORY NEGATIVE: 1
GASTROINTESTINAL NEGATIVE: 1

## 2019-03-14 ENCOUNTER — OFFICE VISIT (OUTPATIENT)
Dept: INTERNAL MEDICINE CLINIC | Age: 44
End: 2019-03-14
Payer: COMMERCIAL

## 2019-03-14 VITALS — WEIGHT: 209.13 LBS | BODY MASS INDEX: 32.75 KG/M2

## 2019-03-14 PROCEDURE — G0108 DIAB MANAGE TRN  PER INDIV: HCPCS | Performed by: INTERNAL MEDICINE

## 2019-05-30 ENCOUNTER — TELEPHONE (OUTPATIENT)
Dept: FAMILY MEDICINE CLINIC | Age: 44
End: 2019-05-30

## 2019-06-06 ENCOUNTER — OFFICE VISIT (OUTPATIENT)
Dept: FAMILY MEDICINE CLINIC | Age: 44
End: 2019-06-06
Payer: COMMERCIAL

## 2019-06-06 VITALS
DIASTOLIC BLOOD PRESSURE: 60 MMHG | HEART RATE: 66 BPM | BODY MASS INDEX: 33.16 KG/M2 | OXYGEN SATURATION: 99 % | WEIGHT: 211.7 LBS | RESPIRATION RATE: 14 BRPM | SYSTOLIC BLOOD PRESSURE: 116 MMHG

## 2019-06-06 DIAGNOSIS — E10.65 UNCONTROLLED TYPE 1 DIABETES MELLITUS WITH HYPERGLYCEMIA (HCC): Primary | ICD-10-CM

## 2019-06-06 DIAGNOSIS — E03.9 HYPOTHYROIDISM, UNSPECIFIED TYPE: ICD-10-CM

## 2019-06-06 DIAGNOSIS — R80.9 MICROALBUMINURIA: ICD-10-CM

## 2019-06-06 DIAGNOSIS — E66.9 OBESITY (BMI 30-39.9): ICD-10-CM

## 2019-06-06 LAB — TSH SERPL DL<=0.05 MIU/L-ACNC: 3.71 UIU/ML (ref 0.49–4.67)

## 2019-06-06 PROCEDURE — 2022F DILAT RTA XM EVC RTNOPTHY: CPT | Performed by: FAMILY MEDICINE

## 2019-06-06 PROCEDURE — 3045F PR MOST RECENT HEMOGLOBIN A1C LEVEL 7.0-9.0%: CPT | Performed by: FAMILY MEDICINE

## 2019-06-06 PROCEDURE — G8427 DOCREV CUR MEDS BY ELIG CLIN: HCPCS | Performed by: FAMILY MEDICINE

## 2019-06-06 PROCEDURE — 1036F TOBACCO NON-USER: CPT | Performed by: FAMILY MEDICINE

## 2019-06-06 PROCEDURE — G8417 CALC BMI ABV UP PARAM F/U: HCPCS | Performed by: FAMILY MEDICINE

## 2019-06-06 PROCEDURE — 99214 OFFICE O/P EST MOD 30 MIN: CPT | Performed by: FAMILY MEDICINE

## 2019-06-06 RX ORDER — LEVOTHYROXINE SODIUM 200 MCG
200 TABLET ORAL DAILY
Qty: 30 TABLET | Refills: 2 | Status: CANCELLED | OUTPATIENT
Start: 2019-06-06

## 2019-06-06 ASSESSMENT — ENCOUNTER SYMPTOMS
GASTROINTESTINAL NEGATIVE: 1
RESPIRATORY NEGATIVE: 1

## 2019-06-06 NOTE — PROGRESS NOTES
Chronic Disease Visit Information    BP Readings from Last 3 Encounters:   03/07/19 128/70   12/06/18 122/80   09/06/18 122/68          Hemoglobin A1C (%)   Date Value   03/06/2019 7.3 (H)   07/09/2018 8.7 (H)   04/06/2018 7.4 (H)     LDL Calculated (mg/dL)   Date Value   03/06/2019 70     HDL (mg/dL)   Date Value   03/06/2019 78.8     BUN (mg/dL)   Date Value   03/06/2019 16     CREATININE (mg/dL)   Date Value   03/06/2019 0.7     Glucose (mg/dL)   Date Value   03/06/2019 47 (L)            Have you changed or started any medications since your last visit including any over-the-counter medicines, vitamins, or herbal medicines? no   Are you having any side effects from any of your medications? -  no  Have you stopped taking any of your medications? Is so, why? -  no    Have you seen any other physician or provider since your last visit? No  Have you had any other diagnostic tests since your last visit? Yes - Records Requested  Have you been seen in the emergency room and/or had an admission to a hospital since we last saw you? No  Have you had your annual diabetic retinal (eye) exam? Yes - Records Obtained  Have you had your routine dental cleaning in the past 6 months? na    Have you activated your [employer] account? If not, what are your barriers?  Yes     Patient Care Team:  Whitley Nunes DO as PCP - General (Family Medicine)  Whitley Nunes DO as PCP - [facility]  Yumiko Hay MD as Consulting Physician (Endocrinology)         Medical History Review  Past Medical, Family, and Social History reviewed and does contribute to the patient presenting condition    Health Maintenance   Topic Date Due    Pneumococcal 0-64 years Vaccine (1 of 1 - PPSV23) 03/02/1979    HIV screen  03/02/1988    Hepatitis B Vaccine (1 of 3 - Risk 3-dose series) 03/02/1992    DTaP/Tdap/Td vaccine (1 - Tdap) 03/02/1992    Diabetic retinal exam  04/06/2018    Cervical cancer screen  07/22/2018    Diabetic foot exam  12/06/2019    A1C test (Diabetic or Prediabetic)  03/06/2020    Diabetic microalbuminuria test  03/06/2020    Lipid screen  03/06/2020    TSH testing  03/06/2020    Potassium monitoring  03/06/2020    Creatinine monitoring  03/06/2020    Flu vaccine  Completed

## 2019-06-06 NOTE — PROGRESS NOTES
Subjective:      Patient ID: Florencia Boyer is a 53 y.o. female. HPI:   Chief Complaint   Patient presents with    3 Month Follow-Up     IDDM    Weight Loss     discuss treatment      3 month eval.    Pt had labs done this am, pending at time of visit. Lab Results   Component Value Date    LABA1C 7.3 (H) 2019     She states that her sugars continue to fluctuate, no longer having low sugars. Has appt with Xenia next week. She is still on the [employer] system. Her insulin pump in [employer], she is due for an upgrade. BPs well controlled. BP Readings from Last 3 Encounters:   19 116/60   19 128/70   18 122/80     Weight is up a few lbs. Pt very concerned about her weight, would like to restart something to jump start her. Has been on Adipex in the past but   Wt Readings from Last 3 Encounters:   19 211 lb 11.2 oz (96 kg)   19 209 lb 2 oz (94.9 kg)   19 208 lb 8 oz (94.6 kg)     Patient Active Problem List   Diagnosis    Diabetes mellitus type [facility], uncontrolled ([facility]    Hypothyroid    Retinopathy of left eye    Microalbuminuria    Chronic constipation    Controlled type 1 diabetes mellitus without complication, with long-term current use of insulin ([facility]    Uncontrolled type 1 diabetes mellitus without complication [facility])     Past Surgical History:   Procedure Laterality Date     SECTION  ,     CHOLECYSTECTOMY  2010    ENDOMETRIAL ABLATION  2008    HYSTERECTOMY      PARTIAL HYSTERECTOMY  4/14/15    Dr. Maykel Alex ovary present bilateral     SHOULDER SURGERY Left 2017    TUBAL LIGATION       Prior to Admission medications    Medication Sig Start Date End Date Taking?  Authorizing Provider   insulin aspart (NOVOLOG) 100 UNIT/ML injection vial USE AS DIRECTED PER PUMP SETTING, MAX -120 UNITS DAILY 3/7/19  Yes Gilda Solorzano, DO   lisinopril (ZESTRIL) 2.5 MG tablet Take 1 tablet by mouth daily 18  Yes Jack Welch Babs Handler, DO   Continuous Blood Gluc  (FREESTYLE DASHAWN 14 DAY READER) ALONZO As directed 12/6/18  Yes Perla Simental, DO   Continuous Blood Gluc Sensor (FREESTYLE DASHAWN 14 DAY SENSOR) MISC As directed. 12/6/18  Yes Perla Simental, DO   blood glucose test strips (SAMMI CONTOUR NEXT TEST) strip Patient is to check blood sugar 4 times per day. Dx: 250.01 6/25/18  Yes Perla Simental, DO   SYNTHROID 200 MCG tablet Take 1 tablet by mouth daily Take with water on an empty stomach- wait 30 minutes before eating or taking other meds. 1/9/18  Yes Perla Simental, DO   Lancets MISC by Does not apply route. 5/8/12  Yes Franco Mcdonough MD         Review of Systems   Constitutional: Negative. HENT: Negative. Respiratory: Negative. Cardiovascular: Negative. Gastrointestinal: Negative. Musculoskeletal: Negative. All other systems reviewed and are negative. Objective:   Physical Exam   Constitutional: She is oriented to person, place, and time. She appears well-developed and well-nourished. HENT:   Head: Normocephalic and atraumatic. Right Ear: Tympanic membrane normal.   Left Ear: Tympanic membrane normal.   Mouth/Throat: Oropharynx is clear and moist and mucous membranes are normal.   Neck: No thyroid mass and no thyromegaly present. Cardiovascular: Normal rate, regular rhythm and normal heart sounds. No murmur heard. Pulmonary/Chest: Effort normal and breath sounds normal.   Abdominal: Soft. Bowel sounds are normal.   Musculoskeletal: She exhibits no edema. Neurological: She is alert and oriented to person, place, and time. Skin: Skin is warm and dry. Psychiatric: She has a normal mood and affect. Her behavior is normal.   Nursing note and vitals reviewed. Assessment:       Diagnosis Orders   1. Uncontrolled type 1 diabetes mellitus with hyperglycemia (HCC)  insulin aspart (NOVOLOG) 100 UNIT/ML injection vial   2. Hypothyroidism, unspecified type     3. Microalbuminuria     4. Obesity (BMI 30-39. 9)             Plan:      -  Chronic medical problems stable  -  Continue current medications  -  Follow up with specialists as scheduled  -  Labs pending at time of visit, will call with results and recommendations once received  -  List of wt loss options given, pt to research  -  RTO 3 mos        Perla Simental DO

## 2019-06-07 DIAGNOSIS — E03.9 HYPOTHYROIDISM, UNSPECIFIED TYPE: ICD-10-CM

## 2019-06-07 LAB
AVERAGE GLUCOSE: 192 MG/DL (ref 66–114)
HBA1C MFR BLD: 8.3 %

## 2019-06-07 RX ORDER — LEVOTHYROXINE SODIUM 200 MCG
200 TABLET ORAL DAILY
Qty: 30 TABLET | Refills: 11 | Status: SHIPPED | OUTPATIENT
Start: 2019-06-07 | End: 2021-03-09 | Stop reason: SDUPTHER

## 2019-09-12 ENCOUNTER — OFFICE VISIT (OUTPATIENT)
Dept: FAMILY MEDICINE CLINIC | Age: 44
End: 2019-09-12
Payer: COMMERCIAL

## 2019-09-12 VITALS
OXYGEN SATURATION: 98 % | SYSTOLIC BLOOD PRESSURE: 120 MMHG | WEIGHT: 211.8 LBS | DIASTOLIC BLOOD PRESSURE: 60 MMHG | HEART RATE: 84 BPM | BODY MASS INDEX: 33.17 KG/M2 | RESPIRATION RATE: 14 BRPM

## 2019-09-12 DIAGNOSIS — E66.9 OBESITY (BMI 30-39.9): ICD-10-CM

## 2019-09-12 DIAGNOSIS — Z23 NEED FOR INFLUENZA VACCINATION: ICD-10-CM

## 2019-09-12 DIAGNOSIS — E03.9 HYPOTHYROIDISM, UNSPECIFIED TYPE: ICD-10-CM

## 2019-09-12 DIAGNOSIS — R80.9 MICROALBUMINURIA: ICD-10-CM

## 2019-09-12 PROCEDURE — 1036F TOBACCO NON-USER: CPT | Performed by: FAMILY MEDICINE

## 2019-09-12 PROCEDURE — G8417 CALC BMI ABV UP PARAM F/U: HCPCS | Performed by: FAMILY MEDICINE

## 2019-09-12 PROCEDURE — G8427 DOCREV CUR MEDS BY ELIG CLIN: HCPCS | Performed by: FAMILY MEDICINE

## 2019-09-12 PROCEDURE — 2022F DILAT RTA XM EVC RTNOPTHY: CPT | Performed by: FAMILY MEDICINE

## 2019-09-12 PROCEDURE — 99214 OFFICE O/P EST MOD 30 MIN: CPT | Performed by: FAMILY MEDICINE

## 2019-09-12 PROCEDURE — 3045F PR MOST RECENT HEMOGLOBIN A1C LEVEL 7.0-9.0%: CPT | Performed by: FAMILY MEDICINE

## 2019-09-12 PROCEDURE — 90471 IMMUNIZATION ADMIN: CPT | Performed by: FAMILY MEDICINE

## 2019-09-12 PROCEDURE — 90686 IIV4 VACC NO PRSV 0.5 ML IM: CPT | Performed by: FAMILY MEDICINE

## 2019-09-12 ASSESSMENT — ENCOUNTER SYMPTOMS
GASTROINTESTINAL NEGATIVE: 1
RESPIRATORY NEGATIVE: 1

## 2019-09-12 NOTE — PROGRESS NOTES
Chronic Disease Visit Information    BP Readings from Last 3 Encounters:   06/06/19 116/60   03/07/19 128/70   12/06/18 122/80          Hemoglobin A1C (%)   Date Value   06/06/2019 8.3 (H)   03/06/2019 7.3 (H)   07/09/2018 8.7 (H)     LDL Calculated (mg/dL)   Date Value   03/06/2019 70     HDL (mg/dL)   Date Value   03/06/2019 78.8     BUN (mg/dL)   Date Value   03/06/2019 16     CREATININE (mg/dL)   Date Value   03/06/2019 0.7     Glucose (mg/dL)   Date Value   03/06/2019 47 (L)            Have you changed or started any medications since your last visit including any over-the-counter medicines, vitamins, or herbal medicines? no   Are you having any side effects from any of your medications? -  no  Have you stopped taking any of your medications? Is so, why? -  no    Have you seen any other physician or provider since your last visit? No  Have you had any other diagnostic tests since your last visit? No  Have you been seen in the emergency room and/or had an admission to a hospital since we last saw you? No  Have you had your annual diabetic retinal (eye) exam? Yes - Records Requested  Have you had your routine dental cleaning in the past 6 months? yes    Have you activated your [employer] Up account? If not, what are your barriers?  Yes     Patient Care Team:  Eloy Booker DO as PCP - General (Family Medicine)  Eloy Booker DO as PCP - [facility] Provider  Iwona Suarez MD as Consulting Physician (Endocrinology)         Medical History Review  Past Medical, Family, and Social History reviewed and does contribute to the patient presenting condition    Health Maintenance   Topic Date Due    Pneumococcal 0-64 years Vaccine (1 of 1 - PPSV23) 03/02/1979    HIV screen  03/02/1988    Hepatitis B Vaccine (1 of 3 - Risk 3-dose series) 03/02/1992    DTaP/Tdap/Td vaccine (1 - Tdap) 03/02/1992    Cervical cancer screen  07/22/2018    Flu vaccine (1) 09/01/2019    Diabetic foot exam  12/06/2019   

## 2019-09-12 NOTE — PROGRESS NOTES
Subjective:      Patient ID: Enzo Beck is a 53 y.o. female. HPI:    Chief Complaint   Patient presents with    3 Month Follow-Up    Diabetes     3 month eval.  Doing well overall. Pt did not get her A1C done. Sugars are doing well overall per pt. Denies frequent hypoglycemic events. Lab Results   Component Value Date    LABA1C 8.3 (H) 2019     BP well controlled. BP Readings from Last 3 Encounters:   19 120/60   19 116/60   19 128/70     Weight stable. Concerned about her lack of wt loss and would like to have her TSH rechecked. Wt Readings from Last 3 Encounters:   19 211 lb 12.8 oz (96.1 kg)   19 211 lb 11.2 oz (96 kg)   19 209 lb 2 oz (94.9 kg)         Patient Active Problem List   Diagnosis    Diabetes mellitus type 1, uncontrolled ([address]    Hypothyroid    Retinopathy of left eye    Microalbuminuria    Chronic constipation    Controlled type 1 diabetes mellitus without complication, with long-term current use of insulin ([address]    Uncontrolled type 1 diabetes mellitus without complication [facility])     Past Surgical History:   Procedure Laterality Date     SECTION  , 2006    CHOLECYSTECTOMY  2010    ENDOMETRIAL ABLATION  2008    HYSTERECTOMY      PARTIAL HYSTERECTOMY  4/14/15    Dr. Ta Rushing ovary present bilateral     SHOULDER SURGERY Left 2017    TUBAL LIGATION       Prior to Admission medications    Medication Sig Start Date End Date Taking? Authorizing Provider   SYNTHROID 200 MCG tablet Take 1 tablet by mouth daily Take with water on an empty stomach- wait 30 minutes before eating or taking other meds.  19  Yes Terrance Rodriguez, DO   insulin aspart (NOVOLOG) 100 UNIT/ML injection vial USE AS DIRECTED PER PUMP SETTING, MAX -120 UNITS DAILY 19  Yes Terrance Rodriguez, DO   lisinopril (ZESTRIL) 2.5 MG tablet Take 1 tablet by mouth daily 18  Yes Terrance Rodriguez, DO   Continuous Blood Gluc  (Cloud DynamicsSTYLE DASHAWN 14 DAY READER) ALONZO As directed 12/6/18  Yes Terrance Rodriguez, DO   Continuous Blood Gluc Sensor (FREESTYLE DASHAWN 14 DAY SENSOR) MISC As directed. 12/6/18  Yes Terrance Rodriguez, DO   blood glucose test strips (SAMMI CONTOUR NEXT TEST) strip Patient is to check blood sugar 4 times per day. Dx: 250.01 6/25/18  Yes Terrance Rodriguez, DO   Lancets MISC by Does not apply route. 5/8/12  Yes Doretha Mcdonough MD         Review of Systems   Constitutional: Negative. HENT: Negative. Respiratory: Negative. Cardiovascular: Negative. Gastrointestinal: Negative. Musculoskeletal: Negative. All other systems reviewed and are negative. Objective:   Physical Exam   Constitutional: She is oriented to person, place, and time. She appears well-developed and well-nourished. HENT:   Head: Normocephalic and atraumatic. Right Ear: Tympanic membrane normal.   Left Ear: Tympanic membrane normal.   Mouth/Throat: Oropharynx is clear and moist and mucous membranes are normal.   Neck: Carotid bruit is not present. Cardiovascular: Normal rate, regular rhythm and normal heart sounds. No murmur heard. Pulmonary/Chest: Effort normal and breath sounds normal.   Abdominal: Soft. Bowel sounds are normal.   Musculoskeletal: She exhibits no edema. Neurological: She is alert and oriented to person, place, and time. Skin: Skin is warm and dry. Psychiatric: She has a normal mood and affect. Her behavior is normal.   Nursing note and vitals reviewed. Assessment:       Diagnosis Orders   1. Uncontrolled type 1 diabetes mellitus without complication (HCC)  Hemoglobin A1C   2. Hypothyroidism, unspecified type     3. Microalbuminuria     4. Obesity (BMI 30-39.9)     5.  Need for influenza vaccination  INFLUENZA, QUADV, 3 YRS AND OLDER, IM PF, PREFILL SYR OR SDV, 0.5ML (AFLURIA QUADV, PF)           Plan:      -  Chronic medical problems stable  -  Continue current medications  -  Follow

## 2019-09-20 ENCOUNTER — HOSPITAL ENCOUNTER (EMERGENCY)
Dept: GENERAL RADIOLOGY | Age: 44
Discharge: HOME | End: 2019-09-20
Payer: COMMERCIAL

## 2019-09-20 ENCOUNTER — TELEPHONE (OUTPATIENT)
Dept: FAMILY MEDICINE CLINIC | Age: 44
End: 2019-09-20

## 2019-09-20 ENCOUNTER — HOSPITAL ENCOUNTER (EMERGENCY)
Age: 44
Discharge: HOME | End: 2019-09-20
Payer: COMMERCIAL

## 2019-09-20 VITALS
TEMPERATURE: 97.8 F | WEIGHT: 213.6 LBS | HEART RATE: 83 BPM | SYSTOLIC BLOOD PRESSURE: 152 MMHG | BODY MASS INDEX: 33.53 KG/M2 | DIASTOLIC BLOOD PRESSURE: 68 MMHG | RESPIRATION RATE: 16 BRPM | OXYGEN SATURATION: 99 % | HEIGHT: 67 IN

## 2019-09-20 DIAGNOSIS — M84.374D METATARSAL STRESS FRACTURE OF RIGHT FOOT WITH ROUTINE HEALING: ICD-10-CM

## 2019-09-20 DIAGNOSIS — M79.671 FOOT PAIN, RIGHT: Primary | ICD-10-CM

## 2019-09-20 PROCEDURE — 99213 OFFICE O/P EST LOW 20 MIN: CPT | Performed by: NURSE PRACTITIONER

## 2019-09-20 PROCEDURE — 73630 X-RAY EXAM OF FOOT: CPT

## 2019-09-20 PROCEDURE — 2709999900 HC NON-CHARGEABLE SUPPLY

## 2019-09-20 PROCEDURE — 99214 OFFICE O/P EST MOD 30 MIN: CPT

## 2019-09-20 RX ORDER — PREDNISONE 20 MG/1
20 TABLET ORAL 2 TIMES DAILY
Qty: 14 TABLET | Refills: 0 | Status: SHIPPED | OUTPATIENT
Start: 2019-09-20 | End: 2019-09-27

## 2019-09-20 RX ORDER — IBUPROFEN 400 MG/1
400 TABLET ORAL EVERY 6 HOURS PRN
COMMUNITY
End: 2019-09-20 | Stop reason: HOSPADM

## 2019-09-20 ASSESSMENT — PAIN SCALES - GENERAL: PAINLEVEL_OUTOF10: 5

## 2019-09-20 ASSESSMENT — PAIN DESCRIPTION - LOCATION: LOCATION: FOOT

## 2019-09-20 ASSESSMENT — PAIN - FUNCTIONAL ASSESSMENT: PAIN_FUNCTIONAL_ASSESSMENT: ACTIVITIES ARE NOT PREVENTED

## 2019-09-20 ASSESSMENT — PAIN DESCRIPTION - ORIENTATION: ORIENTATION: RIGHT

## 2019-09-20 NOTE — TELEPHONE ENCOUNTER
Suellen See with [employer] called office stating they received the CMN back, but it was not complete. It needs to have the blood glucose range wrote on the CMN and office notes need to be faxed as well. Please advise. CMN printed and placed on your desk.

## 2019-09-23 ENCOUNTER — TELEPHONE (OUTPATIENT)
Dept: FAMILY MEDICINE CLINIC | Age: 44
End: 2019-09-23

## 2019-09-26 ENCOUNTER — OFFICE VISIT (OUTPATIENT)
Dept: INTERNAL MEDICINE CLINIC | Age: 44
End: 2019-09-26
Payer: COMMERCIAL

## 2019-09-26 VITALS
SYSTOLIC BLOOD PRESSURE: 129 MMHG | WEIGHT: 214.5 LBS | DIASTOLIC BLOOD PRESSURE: 60 MMHG | RESPIRATION RATE: 16 BRPM | HEART RATE: 76 BPM | BODY MASS INDEX: 33.67 KG/M2 | HEIGHT: 67 IN

## 2019-09-26 LAB — HBA1C MFR BLD: 7.7 % (ref 4.3–5.7)

## 2019-09-26 PROCEDURE — 1036F TOBACCO NON-USER: CPT | Performed by: NURSE PRACTITIONER

## 2019-09-26 PROCEDURE — G8427 DOCREV CUR MEDS BY ELIG CLIN: HCPCS | Performed by: NURSE PRACTITIONER

## 2019-09-26 PROCEDURE — 3045F PR MOST RECENT HEMOGLOBIN A1C LEVEL 7.0-9.0%: CPT | Performed by: NURSE PRACTITIONER

## 2019-09-26 PROCEDURE — 2022F DILAT RTA XM EVC RTNOPTHY: CPT | Performed by: NURSE PRACTITIONER

## 2019-09-26 PROCEDURE — 95251 CONT GLUC MNTR ANALYSIS I&R: CPT | Performed by: NURSE PRACTITIONER

## 2019-09-26 PROCEDURE — 99214 OFFICE O/P EST MOD 30 MIN: CPT | Performed by: NURSE PRACTITIONER

## 2019-09-26 PROCEDURE — 83036 HEMOGLOBIN GLYCOSYLATED A1C: CPT | Performed by: NURSE PRACTITIONER

## 2019-09-26 PROCEDURE — G8417 CALC BMI ABV UP PARAM F/U: HCPCS | Performed by: NURSE PRACTITIONER

## 2019-09-26 ASSESSMENT — ENCOUNTER SYMPTOMS
VOMITING: 0
SORE THROAT: 0
DIARRHEA: 0
NAUSEA: 0
CONSTIPATION: 0
SHORTNESS OF BREATH: 0
COUGH: 0
EYE ITCHING: 0
TROUBLE SWALLOWING: 0
CHOKING: 0
ABDOMINAL PAIN: 0

## 2019-10-21 ENCOUNTER — OFFICE VISIT (OUTPATIENT)
Dept: INTERNAL MEDICINE CLINIC | Age: 44
End: 2019-10-21

## 2019-10-21 DIAGNOSIS — E10.9 CONTROLLED TYPE 1 DIABETES MELLITUS WITHOUT COMPLICATION, WITH LONG-TERM CURRENT USE OF INSULIN (HCC): Primary | ICD-10-CM

## 2019-10-24 ASSESSMENT — ENCOUNTER SYMPTOMS: SHORTNESS OF BREATH: 0

## 2019-11-21 ENCOUNTER — OFFICE VISIT (OUTPATIENT)
Dept: FAMILY MEDICINE CLINIC | Age: 44
End: 2019-11-21
Payer: COMMERCIAL

## 2019-11-21 VITALS
WEIGHT: 211.6 LBS | TEMPERATURE: 98.2 F | RESPIRATION RATE: 16 BRPM | BODY MASS INDEX: 33.13 KG/M2 | DIASTOLIC BLOOD PRESSURE: 74 MMHG | OXYGEN SATURATION: 98 % | HEART RATE: 80 BPM | SYSTOLIC BLOOD PRESSURE: 122 MMHG

## 2019-11-21 DIAGNOSIS — B34.9 VIRAL ILLNESS: Primary | ICD-10-CM

## 2019-11-21 LAB
INFLUENZA A ANTIBODY: NORMAL
INFLUENZA B ANTIBODY: NORMAL

## 2019-11-21 PROCEDURE — G8417 CALC BMI ABV UP PARAM F/U: HCPCS | Performed by: FAMILY MEDICINE

## 2019-11-21 PROCEDURE — 87804 INFLUENZA ASSAY W/OPTIC: CPT | Performed by: FAMILY MEDICINE

## 2019-11-21 PROCEDURE — 99213 OFFICE O/P EST LOW 20 MIN: CPT | Performed by: FAMILY MEDICINE

## 2019-11-21 PROCEDURE — 1036F TOBACCO NON-USER: CPT | Performed by: FAMILY MEDICINE

## 2019-11-21 PROCEDURE — G8427 DOCREV CUR MEDS BY ELIG CLIN: HCPCS | Performed by: FAMILY MEDICINE

## 2019-11-21 PROCEDURE — G8482 FLU IMMUNIZE ORDER/ADMIN: HCPCS | Performed by: FAMILY MEDICINE

## 2019-11-21 RX ORDER — HYDROCODONE POLISTIREX AND CHLORPHENIRAMINE POLISTIREX 10; 8 MG/5ML; MG/5ML
5 SUSPENSION, EXTENDED RELEASE ORAL EVERY 12 HOURS PRN
Qty: 115 ML | Refills: 0 | Status: SHIPPED | OUTPATIENT
Start: 2019-11-21 | End: 2019-12-03

## 2019-11-21 ASSESSMENT — ENCOUNTER SYMPTOMS
SINUS PRESSURE: 1
COUGH: 1
GASTROINTESTINAL NEGATIVE: 1
SORE THROAT: 1
RHINORRHEA: 1

## 2019-12-05 ENCOUNTER — OFFICE VISIT (OUTPATIENT)
Dept: INTERNAL MEDICINE CLINIC | Age: 44
End: 2019-12-05
Payer: COMMERCIAL

## 2019-12-05 VITALS
SYSTOLIC BLOOD PRESSURE: 126 MMHG | BODY MASS INDEX: 33.84 KG/M2 | HEART RATE: 84 BPM | DIASTOLIC BLOOD PRESSURE: 64 MMHG | HEIGHT: 67 IN | WEIGHT: 215.6 LBS

## 2019-12-05 DIAGNOSIS — E10.65 UNCONTROLLED TYPE 1 DIABETES MELLITUS WITH HYPERGLYCEMIA (HCC): ICD-10-CM

## 2019-12-05 PROCEDURE — G0108 DIAB MANAGE TRN  PER INDIV: HCPCS | Performed by: INTERNAL MEDICINE

## 2019-12-10 ENCOUNTER — TELEPHONE (OUTPATIENT)
Dept: FAMILY MEDICINE CLINIC | Age: 44
End: 2019-12-10

## 2019-12-16 ENCOUNTER — TELEPHONE (OUTPATIENT)
Dept: INTERNAL MEDICINE CLINIC | Age: 44
End: 2019-12-16

## 2020-01-30 ENCOUNTER — OFFICE VISIT (OUTPATIENT)
Dept: INTERNAL MEDICINE CLINIC | Age: 45
End: 2020-01-30
Payer: COMMERCIAL

## 2020-01-30 VITALS
DIASTOLIC BLOOD PRESSURE: 72 MMHG | HEART RATE: 72 BPM | BODY MASS INDEX: 34.34 KG/M2 | HEIGHT: 67 IN | WEIGHT: 218.8 LBS | SYSTOLIC BLOOD PRESSURE: 118 MMHG

## 2020-01-30 LAB — HBA1C MFR BLD: 7.6 % (ref 4.3–5.7)

## 2020-01-30 PROCEDURE — 83036 HEMOGLOBIN GLYCOSYLATED A1C: CPT | Performed by: INTERNAL MEDICINE

## 2020-01-30 PROCEDURE — G0108 DIAB MANAGE TRN  PER INDIV: HCPCS | Performed by: INTERNAL MEDICINE

## 2020-01-30 NOTE — PROGRESS NOTES
[facility]  [address] [facility], [address]  [phone] (phone)  [phone] (fax)    Patient ID: Nayely Palacios 1975  Referring Provider: Keron West CNP     Patient's name and  were verified. Subjective:    She presents for Her follow-up diabetic visit. She has type 1 diabetes mellitus. Home regimen includes: insulin She is noncompliant some of the time. Assessment:     Lab Results   Component Value Date    LABA1C 7.7 2019    LABA1C 8.3 2019    BUN 16 2019    CREATININE 0.7 2019     There were no vitals filed for this visit. Wt Readings from Last 3 Encounters:   19 215 lb 9.6 oz (97.8 kg)   19 211 lb 9.6 oz (96 kg)   19 214 lb 8 oz (97.3 kg)     Ht Readings from Last 3 Encounters:   19 5' 7\" (1.702 m)   19 5' 7.01\" (1.702 m)   19 5' 7\" (1.702 m)       Glucose at 1.5  hrs PPD today resulted at 106mg/dl  Current monitoring regimen: home blood tests - 4 times daily  Home blood sugar trends: FBS's 119-202. Noon 114-166. Dinner 060-510  Any episodes of hypoglycemia? yes - 1-2 times per week; highest risk at work-[place]  Previous visit with dietician: remote  Current diet: B small cake donut                             Republic breakfast drink         L salad  Cheese/ pb crackers         D spagetti 2 cups         Snacks -hot tea at bedtime  Current exercise: ADL's-active at work                     Eliptical 30 minutes 3 days/wk  Eye exam current (within one year): yes 2019 [facility]  Any history of foot problems? Yes -dry skin  Last foot exam: 20 Pedal pulses:   peripheral pulses symmetrical   Results of monofilament test: 10/10              Skin noted to be warm and hair is absent. Calluses around heels; ball of feet. Immunizations up to date: yes - 2019  Taking ASA:  No - If not why? Not indicated  Appropriate for use of MyChart Glucose Grid:  Yes    Focus: Follow up visit. A1C today 7.6%.  Aria suggs

## 2020-04-27 ENCOUNTER — VIRTUAL VISIT (OUTPATIENT)
Dept: INTERNAL MEDICINE CLINIC | Age: 45
End: 2020-04-27
Payer: COMMERCIAL

## 2020-04-27 PROCEDURE — 99214 OFFICE O/P EST MOD 30 MIN: CPT | Performed by: NURSE PRACTITIONER

## 2020-04-27 PROCEDURE — 2022F DILAT RTA XM EVC RTNOPTHY: CPT | Performed by: NURSE PRACTITIONER

## 2020-04-27 PROCEDURE — G8427 DOCREV CUR MEDS BY ELIG CLIN: HCPCS | Performed by: NURSE PRACTITIONER

## 2020-04-27 PROCEDURE — 3051F HG A1C>EQUAL 7.0%<8.0%: CPT | Performed by: NURSE PRACTITIONER

## 2020-04-27 ASSESSMENT — ENCOUNTER SYMPTOMS
NAUSEA: 0
ABDOMINAL PAIN: 0
COUGH: 0
DIARRHEA: 0
SHORTNESS OF BREATH: 0
VOMITING: 0
CONSTIPATION: 0

## 2020-05-28 ENCOUNTER — VIRTUAL VISIT (OUTPATIENT)
Dept: FAMILY MEDICINE CLINIC | Age: 45
End: 2020-05-28
Payer: COMMERCIAL

## 2020-05-28 VITALS
HEART RATE: 74 BPM | SYSTOLIC BLOOD PRESSURE: 120 MMHG | DIASTOLIC BLOOD PRESSURE: 72 MMHG | BODY MASS INDEX: 35.24 KG/M2 | WEIGHT: 225 LBS

## 2020-05-28 PROCEDURE — 2022F DILAT RTA XM EVC RTNOPTHY: CPT | Performed by: FAMILY MEDICINE

## 2020-05-28 PROCEDURE — 3051F HG A1C>EQUAL 7.0%<8.0%: CPT | Performed by: FAMILY MEDICINE

## 2020-05-28 PROCEDURE — 99214 OFFICE O/P EST MOD 30 MIN: CPT | Performed by: FAMILY MEDICINE

## 2020-05-28 PROCEDURE — G8428 CUR MEDS NOT DOCUMENT: HCPCS | Performed by: FAMILY MEDICINE

## 2020-05-28 ASSESSMENT — ENCOUNTER SYMPTOMS
GASTROINTESTINAL NEGATIVE: 1
RESPIRATORY NEGATIVE: 1

## 2020-09-10 ENCOUNTER — OFFICE VISIT (OUTPATIENT)
Dept: INTERNAL MEDICINE CLINIC | Age: 45
End: 2020-09-10
Payer: COMMERCIAL

## 2020-09-10 ENCOUNTER — TELEPHONE (OUTPATIENT)
Dept: INTERNAL MEDICINE CLINIC | Age: 45
End: 2020-09-10

## 2020-09-10 VITALS
TEMPERATURE: 98.6 F | HEART RATE: 74 BPM | DIASTOLIC BLOOD PRESSURE: 68 MMHG | WEIGHT: 223 LBS | SYSTOLIC BLOOD PRESSURE: 108 MMHG | BODY MASS INDEX: 35 KG/M2 | HEIGHT: 67 IN

## 2020-09-10 LAB — HBA1C MFR BLD: 8.6 % (ref 4.3–5.7)

## 2020-09-10 PROCEDURE — 83036 HEMOGLOBIN GLYCOSYLATED A1C: CPT | Performed by: INTERNAL MEDICINE

## 2020-09-10 PROCEDURE — G0108 DIAB MANAGE TRN  PER INDIV: HCPCS | Performed by: INTERNAL MEDICINE

## 2020-09-10 NOTE — TELEPHONE ENCOUNTER
Diabetes education. A1C today 8.6%  Glucose levels on pump download reflect high blood sugars. Per self report and random Tay Calloway reports, she is having low blood sugars 4-5hrs after eating meals. REYNALDO Hong CNP,       Please authorize the [facility] at [address] [place] [address] to teach/ assist Aria to make the following pump adjustments to decrease spikes in BS's with meals and minimize lows 4-5 hrspp- especially after dinner/ night. She is also seeing a rise in glucose from 5am-8am with no dietary intake. Basal 12am 1.10 units  --decr 1.0 units             7am 1.2 units   --change to 5am/ increase 1.3 units             7pm 1.6 units  --decr 1.5 units             10pm 1.3 units  --decr 1.2 units  Carb ratio  12am 7.5 grams  --incr 7 grams             6:30am 5.5 grams  --incr 5 grams             6pm 5 grams  --incr 4.5 grams            10pm 7.5 grams     If you agree, please note and return. Thank you.

## 2020-09-10 NOTE — TELEPHONE ENCOUNTER
Aria instructed on insulin pump setting changes as below  Basal 12am 1.10 units  --decr 1.0 units             7am 1.2 units   --change to 5am/ increase 1.3 units             7pm 1.6 units  --decr 1.5 units             10pm 1.3 units  --decr 1.2 units  Carb ratio  12am 7.5 grams  --incr 7 grams             6:30am 5.5 grams  --incr 5 grams             6pm 5 grams  --incr 4.5 grams            10pm 7.5 grams  Aria voiced understanding via teach back. Aria agrees to follow up with insulin pump download in 1 week.

## 2020-09-10 NOTE — PROGRESS NOTES
[facility]  [address] [place], [address]  [phone] (phone)  [phone] (fax)    Patient ID: Anjum Monte 1975  Referring Provider: Slim Whitehead CNP     Patient's name and  were verified. Subjective:    She presents for Her follow-up diabetic visit. She has type 1 diabetes mellitus. Home regimen includes: insulin She is noncompliant some of the time. Assessment:     Lab Results   Component Value Date    LABA1C 7.6 2020    LABA1C 8.3 2019    BUN 16 2019    CREATININE 0.7 2019     There were no vitals filed for this visit. Wt Readings from Last 3 Encounters:   20 225 lb (102.1 kg)   20 218 lb 12.8 oz (99.2 kg)   19 215 lb 9.6 oz (97.8 kg)     Ht Readings from Last 3 Encounters:   20 5' 7\" (1.702 m)   19 5' 7\" (1.702 m)   19 5' 7.01\" (1.702 m)       Glucose at FBS today resulted at 221mg/dl  Current monitoring regimen: home blood tests - 4 times daily/ CGM [facility] blood sugar trends: FBS's 142-over 400. Noon 148-184. Dinner 135-over 400.   Any episodes of hypoglycemia? yes - 3-4 times per week 4-5 hrpp meals/ bedtime  Previous visit with dietician: yes - working with [facility] [address]   Current diet: B- skips                    L-12-1pm bagged salad                    D-6-7pm hamburgers/ cheese/ tran/ corn/ pasta salad                    Snacks- tea-unsw. Diet soda-less than once daily. Propel 0  Current exercise: eliptical 3 days per week; 15 minutes  Eye exam current (within one year): no 2019 [facility]  Any history of foot problems? no  Last foot exam: 2020  Immunizations up to date: yes - 2019  Taking ASA:  No - If not why? Not indicated  Appropriate for use of MyChart Glucose Grid:  Yes    Focus:     Diabetes education. A1C today 8.6%. Aria states she has gotten off track.  She is wearing the Fior CGM, but is only checking her blood sugar once daily--this limits CGM info to only 8 hours per

## 2020-10-28 ENCOUNTER — TELEPHONE (OUTPATIENT)
Dept: FAMILY MEDICINE CLINIC | Age: 45
End: 2020-10-28

## 2020-10-28 ENCOUNTER — VIRTUAL VISIT (OUTPATIENT)
Dept: FAMILY MEDICINE CLINIC | Age: 45
End: 2020-10-28
Payer: COMMERCIAL

## 2020-10-28 ENCOUNTER — NURSE TRIAGE (OUTPATIENT)
Dept: OTHER | Facility: CLINIC | Age: 45
End: 2020-10-28

## 2020-10-28 ENCOUNTER — HOSPITAL ENCOUNTER (OUTPATIENT)
Age: 45
Setting detail: SPECIMEN
Discharge: HOME | End: 2020-10-28
Payer: COMMERCIAL

## 2020-10-28 PROCEDURE — G8428 CUR MEDS NOT DOCUMENT: HCPCS | Performed by: NURSE PRACTITIONER

## 2020-10-28 PROCEDURE — U0003 INFECTIOUS AGENT DETECTION BY NUCLEIC ACID (DNA OR RNA); SEVERE ACUTE RESPIRATORY SYNDROME CORONAVIRUS 2 (SARS-COV-2) (CORONAVIRUS DISEASE [COVID-19]), AMPLIFIED PROBE TECHNIQUE, MAKING USE OF HIGH THROUGHPUT TECHNOLOGIES AS DESCRIBED BY CMS-2020-01-R: HCPCS

## 2020-10-28 PROCEDURE — 99213 OFFICE O/P EST LOW 20 MIN: CPT | Performed by: NURSE PRACTITIONER

## 2020-10-28 ASSESSMENT — ENCOUNTER SYMPTOMS
NAUSEA: 0
SHORTNESS OF BREATH: 0
CHEST TIGHTNESS: 1
COUGH: 1
RHINORRHEA: 1
SORE THROAT: 0
ABDOMINAL PAIN: 0

## 2020-10-28 NOTE — PROGRESS NOTES
Subjective:      Patient ID: Sandy Lua is a 50 y.o. female    HPI: Acute for flu-like    TELEHEALTH EVALUATION -- Audio/Visual (During [facility])    Services were provided through a video synchronous discussion virtually to substitute for in-person clinic visit. Patient and provider were located at their individual homes. Patient has requested an audio/video evaluation for the following concern(s):    Chief Complaint   Patient presents with    Chills     Onset of 2-3 days with chills, fever, body aches, headache and cough. Dry cough. HA is most bothersome. Low grade fever 99. Denies CP, SOB or chest tightness. Denies loss of taste or smell. No known exposure. No one in household is sick. Works at [employer] and comes in contact in public frequent    OTC:  Cristian    Patient Active Problem List   Diagnosis    Diabetes mellitus type [facility], uncontrolled ([facility]    Hypothyroid    Retinopathy of left eye    Microalbuminuria    Chronic constipation    Controlled type 1 diabetes mellitus without complication, with long-term current use of insulin (HCC)    Uncontrolled type 1 diabetes mellitus without complication       Review of Systems   Constitutional: Positive for activity change, appetite change, chills, fatigue and fever. HENT: Positive for congestion, postnasal drip and rhinorrhea. Negative for sore throat. Respiratory: Positive for cough and chest tightness. Negative for shortness of breath. Cardiovascular: Negative for chest pain. Gastrointestinal: Negative for abdominal pain and nausea. Musculoskeletal: Positive for myalgias. Skin: Negative for rash. Neurological: Positive for headaches. Negative for dizziness and light-headedness. Psychiatric/Behavioral: Negative. Objective:   Physical Exam  Constitutional:       General: She is not in acute distress. Appearance: She is not ill-appearing.    Pulmonary:      Effort: Pulmonary effort is normal. No respiratory distress. Neurological:      Mental Status: She is alert and oriented to person, place, and time. Psychiatric:         Mood and Affect: Mood normal.         Behavior: Behavior normal.         Assessment:       Diagnosis Orders   1. Generalized body aches  COVID-19 Ambulatory   2. Acute nonintractable headache, unspecified headache type  COVID-19 Ambulatory   3. Cough  COVID-19 Ambulatory       Plan:   Viral nature of symptoms discussed  Symptomatic Care  Increase fluids and rest  COVID-19 testing  RTO if symptoms worsen or stay the same        Due to this being a TeleHealth encounter (During [employer]-75 public health emergency), evaluation of the following organ systems was limited: Vitals/Constitutional/EENT/Resp/CV/GI//MS/Neuro/Skin/Heme-Lymph-Imm. Pursuant to the emergency declaration under the [address] [place] [address], [address] [place] [address] authority and the [employer] Act, this Virtual Visit was conducted with patient's (and/or legal guardian's) consent, to reduce the patient's risk of exposure to COVID-19 and provide necessary medical care. The patient (and/or legal guardian) has also been advised to contact this office for worsening conditions or problems, and seek emergency medical treatment and/or call 911 if deemed necessary.

## 2020-10-28 NOTE — TELEPHONE ENCOUNTER
Return from Nurse Triage call, patient has chills, cough, body aches, headaches, chills Per Nurse Triage to do a virtual today.  Please call patient back at [phone] to advise

## 2020-10-28 NOTE — TELEPHONE ENCOUNTER
Reason for Disposition   [1] COVID-19 infection suspected by caller or triager AND [2] mild symptoms (cough, fever, or others) AND [2] no complications or SOB    Answer Assessment - Initial Assessment Questions  1. COVID-19 DIAGNOSIS: \"Who made your Coronavirus (COVID-19) diagnosis? \" \"Was it confirmed by a positive lab test?\" If not diagnosed by a HCP, ask \"Are there lots of cases (community spread) where you live? \" (See public health department website, if unsure)      Not tested    2. ONSET: \"When did the COVID-19 symptoms start? \"       Couple days ago    3. WORST SYMPTOM: \"What is your worst symptom? \" (e.g., cough, fever, shortness of breath, muscle aches)      H/a    4. COUGH: \"Do you have a cough? \" If so, ask: \"How bad is the cough? \"        Dry cough    5. FEVER: \"Do you have a fever? \" If so, ask: \"What is your temperature, how was it measured, and when did it start? \"      99    6. RESPIRATORY STATUS: \"Describe your breathing? \" (e.g., shortness of breath, wheezing, unable to speak)       Denies    7. BETTER-SAME-WORSE: Ann Marie Ripple you getting better, staying the same or getting worse compared to yesterday? \"  If getting worse, ask, \"In what way? \"      Worse    8. HIGH RISK DISEASE: \"Do you have any chronic medical problems? \" (e.g., asthma, heart or lung disease, weak immune system, etc.)     Diabetic    9. PREGNANCY: \"Is there any chance you are pregnant? \" \"When was your last menstrual period? \"        10. OTHER SYMPTOMS: \"Do you have any other symptoms? \"  (e.g., chills, fatigue, headache, loss of smell or taste, muscle pain, sore throat)       Chills, h/a, body aches, sore throat, fatigue    Protocols used: CORONAVIRUS (COVID-19) DIAGNOSED OR SUSPECTED-ADULT-    Patient called pre-service [facility]) [facility] with red flag complaint.      Brief description of triage: as above    Triage indicates for patient to be seen today    Care advice provided, patient verbalizes understanding; denies any other questions or concerns; instructed to call back for any new or worsening symptoms. Writer provided warm transfer to [facility] at [facility] for appointment scheduling. Attention Provider: Thank you for allowing me to participate in the care of your patient. The patient was connected to triage in response to information provided to the E[facility]. Please do not respond through this encounter as the response is not directed to a shared pool.

## 2020-10-30 LAB — SARS-COV-2: DETECTED

## 2020-11-05 ENCOUNTER — TELEPHONE (OUTPATIENT)
Dept: FAMILY MEDICINE CLINIC | Age: 45
End: 2020-11-05

## 2020-11-05 NOTE — TELEPHONE ENCOUNTER
The patient called in and stated that she is Covid positive, she had a VV on 10/28/20. She is still coughing, head congestion and a lot of drainage. She is wondering what she can take to help with her S/S. Please advise.

## 2020-11-15 ENCOUNTER — APPOINTMENT (OUTPATIENT)
Dept: CT IMAGING | Age: 45
End: 2020-11-15
Payer: COMMERCIAL

## 2020-11-15 ENCOUNTER — APPOINTMENT (OUTPATIENT)
Dept: GENERAL RADIOLOGY | Age: 45
End: 2020-11-15
Payer: COMMERCIAL

## 2020-11-15 ENCOUNTER — HOSPITAL ENCOUNTER (EMERGENCY)
Age: 45
Discharge: HOME | End: 2020-11-15
Attending: EMERGENCY MEDICINE
Payer: COMMERCIAL

## 2020-11-15 VITALS
DIASTOLIC BLOOD PRESSURE: 54 MMHG | HEART RATE: 73 BPM | BODY MASS INDEX: 30.31 KG/M2 | HEIGHT: 68 IN | WEIGHT: 200 LBS | OXYGEN SATURATION: 100 % | RESPIRATION RATE: 20 BRPM | TEMPERATURE: 98.8 F | SYSTOLIC BLOOD PRESSURE: 136 MMHG

## 2020-11-15 LAB
ALBUMIN SERPL-MCNC: 4.2 G/DL (ref 3.5–5.1)
ALP BLD-CCNC: 103 U/L (ref 38–126)
ALT SERPL-CCNC: 25 U/L (ref 11–66)
ANION GAP SERPL CALCULATED.3IONS-SCNC: 11 MEQ/L (ref 8–16)
APTT: 28.4 SECONDS (ref 22–38)
AST SERPL-CCNC: 24 U/L (ref 5–40)
BACTERIA: ABNORMAL /HPF
BASOPHILS # BLD: 0.5 %
BASOPHILS ABSOLUTE: 0 THOU/MM3 (ref 0–0.1)
BILIRUB SERPL-MCNC: 0.2 MG/DL (ref 0.3–1.2)
BILIRUBIN DIRECT: < 0.2 MG/DL (ref 0–0.3)
BILIRUBIN URINE: NEGATIVE
BLOOD, URINE: NEGATIVE
BUN BLDV-MCNC: 6 MG/DL (ref 7–22)
CALCIUM SERPL-MCNC: 8.9 MG/DL (ref 8.5–10.5)
CASTS 2: ABNORMAL /LPF
CASTS UA: ABNORMAL /LPF
CHARACTER, URINE: CLEAR
CHLORIDE BLD-SCNC: 104 MEQ/L (ref 98–111)
CO2: 25 MEQ/L (ref 23–33)
COLOR: YELLOW
CREAT SERPL-MCNC: 0.6 MG/DL (ref 0.4–1.2)
CRYSTALS, UA: ABNORMAL
D-DIMER QUANTITATIVE: 1029 NG/ML FEU (ref 0–500)
EKG ATRIAL RATE: 84 BPM
EKG P AXIS: 69 DEGREES
EKG P-R INTERVAL: 132 MS
EKG Q-T INTERVAL: 380 MS
EKG QRS DURATION: 74 MS
EKG QTC CALCULATION (BAZETT): 449 MS
EKG R AXIS: 61 DEGREES
EKG T AXIS: 6 DEGREES
EKG VENTRICULAR RATE: 84 BPM
EOSINOPHIL # BLD: 0.9 %
EOSINOPHILS ABSOLUTE: 0.1 THOU/MM3 (ref 0–0.4)
EPITHELIAL CELLS, UA: ABNORMAL /HPF
ERYTHROCYTE [DISTWIDTH] IN BLOOD BY AUTOMATED COUNT: 13.2 % (ref 11.5–14.5)
ERYTHROCYTE [DISTWIDTH] IN BLOOD BY AUTOMATED COUNT: 42.4 FL (ref 35–45)
GFR SERPL CREATININE-BSD FRML MDRD: > 90 ML/MIN/1.73M2
GLUCOSE BLD-MCNC: 216 MG/DL (ref 70–108)
GLUCOSE URINE: 100 MG/DL
HCT VFR BLD CALC: 43.2 % (ref 37–47)
HEMOGLOBIN: 14.1 GM/DL (ref 12–16)
IMMATURE GRANS (ABS): 0.03 THOU/MM3 (ref 0–0.07)
IMMATURE GRANULOCYTES: 0.3 %
INR BLD: 0.93 (ref 0.85–1.13)
KETONES, URINE: ABNORMAL
LEUKOCYTE ESTERASE, URINE: NEGATIVE
LIPASE: 10.9 U/L (ref 5.6–51.3)
LYMPHOCYTES # BLD: 29.6 %
LYMPHOCYTES ABSOLUTE: 2.9 THOU/MM3 (ref 1–4.8)
MCH RBC QN AUTO: 29 PG (ref 26–33)
MCHC RBC AUTO-ENTMCNC: 32.6 GM/DL (ref 32.2–35.5)
MCV RBC AUTO: 88.9 FL (ref 81–99)
MISCELLANEOUS 2: ABNORMAL
MONOCYTES # BLD: 8.1 %
MONOCYTES ABSOLUTE: 0.8 THOU/MM3 (ref 0.4–1.3)
NITRITE, URINE: POSITIVE
NUCLEATED RED BLOOD CELLS: 0 /100 WBC
OSMOLALITY CALCULATION: 283.5 MOSMOL/KG (ref 275–300)
PH UA: 6 (ref 5–9)
PLATELET # BLD: 297 THOU/MM3 (ref 130–400)
PMV BLD AUTO: 12.2 FL (ref 9.4–12.4)
POTASSIUM SERPL-SCNC: 3.6 MEQ/L (ref 3.5–5.2)
PRO-BNP: 173.7 PG/ML (ref 0–450)
PROTEIN UA: NEGATIVE
RBC # BLD: 4.86 MILL/MM3 (ref 4.2–5.4)
RBC URINE: ABNORMAL /HPF
RENAL EPITHELIAL, UA: ABNORMAL
SEG NEUTROPHILS: 60.6 %
SEGMENTED NEUTROPHILS ABSOLUTE COUNT: 5.9 THOU/MM3 (ref 1.8–7.7)
SODIUM BLD-SCNC: 140 MEQ/L (ref 135–145)
SPECIFIC GRAVITY, URINE: 1.02 (ref 1–1.03)
TOTAL PROTEIN: 6.9 G/DL (ref 6.1–8)
TROPONIN T: < 0.01 NG/ML
UROBILINOGEN, URINE: 1 EU/DL (ref 0–1)
WBC # BLD: 9.7 THOU/MM3 (ref 4.8–10.8)
WBC UA: ABNORMAL /HPF
YEAST: ABNORMAL

## 2020-11-15 PROCEDURE — 2580000003 HC RX 258: Performed by: EMERGENCY MEDICINE

## 2020-11-15 PROCEDURE — 85730 THROMBOPLASTIN TIME PARTIAL: CPT

## 2020-11-15 PROCEDURE — 6360000004 HC RX CONTRAST MEDICATION: Performed by: EMERGENCY MEDICINE

## 2020-11-15 PROCEDURE — 36415 COLL VENOUS BLD VENIPUNCTURE: CPT

## 2020-11-15 PROCEDURE — 6370000000 HC RX 637 (ALT 250 FOR IP): Performed by: EMERGENCY MEDICINE

## 2020-11-15 PROCEDURE — 81001 URINALYSIS AUTO W/SCOPE: CPT

## 2020-11-15 PROCEDURE — 80053 COMPREHEN METABOLIC PANEL: CPT

## 2020-11-15 PROCEDURE — 84484 ASSAY OF TROPONIN QUANT: CPT

## 2020-11-15 PROCEDURE — 83690 ASSAY OF LIPASE: CPT

## 2020-11-15 PROCEDURE — 85610 PROTHROMBIN TIME: CPT

## 2020-11-15 PROCEDURE — 85379 FIBRIN DEGRADATION QUANT: CPT

## 2020-11-15 PROCEDURE — 71275 CT ANGIOGRAPHY CHEST: CPT

## 2020-11-15 PROCEDURE — 71045 X-RAY EXAM CHEST 1 VIEW: CPT

## 2020-11-15 PROCEDURE — 83880 ASSAY OF NATRIURETIC PEPTIDE: CPT

## 2020-11-15 PROCEDURE — 93005 ELECTROCARDIOGRAM TRACING: CPT | Performed by: EMERGENCY MEDICINE

## 2020-11-15 PROCEDURE — 99285 EMERGENCY DEPT VISIT HI MDM: CPT

## 2020-11-15 PROCEDURE — 85025 COMPLETE CBC W/AUTO DIFF WBC: CPT

## 2020-11-15 PROCEDURE — 82248 BILIRUBIN DIRECT: CPT

## 2020-11-15 RX ORDER — ASCORBIC ACID 500 MG
500 TABLET ORAL 2 TIMES DAILY
Qty: 14 TABLET | Refills: 0 | Status: SHIPPED | OUTPATIENT
Start: 2020-11-15 | End: 2021-03-09

## 2020-11-15 RX ORDER — DOXYCYCLINE HYCLATE 100 MG
100 TABLET ORAL ONCE
Status: COMPLETED | OUTPATIENT
Start: 2020-11-15 | End: 2020-11-15

## 2020-11-15 RX ORDER — ONDANSETRON 2 MG/ML
4 INJECTION INTRAMUSCULAR; INTRAVENOUS ONCE
Status: DISCONTINUED | OUTPATIENT
Start: 2020-11-15 | End: 2020-11-15 | Stop reason: HOSPADM

## 2020-11-15 RX ORDER — ZINC SULFATE 50(220)MG
50 CAPSULE ORAL DAILY
Qty: 7 CAPSULE | Refills: 0 | Status: SHIPPED | OUTPATIENT
Start: 2020-11-15 | End: 2021-03-09

## 2020-11-15 RX ORDER — DOXYCYCLINE HYCLATE 100 MG
100 TABLET ORAL 2 TIMES DAILY
Qty: 20 TABLET | Refills: 0 | Status: SHIPPED | OUTPATIENT
Start: 2020-11-15 | End: 2020-11-25

## 2020-11-15 RX ORDER — SODIUM CHLORIDE 9 MG/ML
1000 INJECTION, SOLUTION INTRAVENOUS CONTINUOUS
Status: DISCONTINUED | OUTPATIENT
Start: 2020-11-15 | End: 2020-11-15 | Stop reason: HOSPADM

## 2020-11-15 RX ORDER — OXYMETAZOLINE HYDROCHLORIDE 0.05 G/100ML
1 SPRAY NASAL ONCE
Status: COMPLETED | OUTPATIENT
Start: 2020-11-15 | End: 2020-11-15

## 2020-11-15 RX ADMIN — SODIUM CHLORIDE 1000 ML: 9 INJECTION, SOLUTION INTRAVENOUS at 17:32

## 2020-11-15 RX ADMIN — OXYMETAZOLINE HYDROCHLORIDE 1 SPRAY: 0.05 SPRAY NASAL at 20:19

## 2020-11-15 RX ADMIN — DOXYCYCLINE HYCLATE 100 MG: 100 TABLET, COATED ORAL at 20:31

## 2020-11-15 RX ADMIN — IOPAMIDOL 80 ML: 755 INJECTION, SOLUTION INTRAVENOUS at 19:43

## 2020-11-15 ASSESSMENT — ENCOUNTER SYMPTOMS
DIARRHEA: 0
WHEEZING: 0
SINUS PRESSURE: 1
CONSTIPATION: 0
BACK PAIN: 0
SHORTNESS OF BREATH: 1
RHINORRHEA: 1
NAUSEA: 1
SORE THROAT: 0
VOMITING: 1
ABDOMINAL PAIN: 1
ABDOMINAL DISTENTION: 0
COUGH: 1
EYE REDNESS: 0
CHEST TIGHTNESS: 0

## 2020-11-15 NOTE — ED NOTES
Attempted to obtain additional IV at this time for CTA. Unable to obtain 20 gauge IV. Provider notified.       Erum Murillo RN  11/15/20 6768

## 2020-11-15 NOTE — ED TRIAGE NOTES
Pt presents to the ED from home with c/o increasing SOB with Covid-19. Pt was diagnosed with Covid on 10/31. Pts ambulating pulse ox is 95%. EKG complete. IV access maintained. Pt afebrile.

## 2020-11-15 NOTE — ED PROVIDER NOTES
[place] ENCOUNTER          Pt Name: Patricia Warren  MRN: [id number]  [place] 1975  Date of evaluation: 11/15/2020  Emergency Physician: Shantal Chan, [address] [facility]       Chief Complaint   Patient presents with    Concern For COVID-19    Shortness of Breath     History obtained from the patient. HISTORY OF PRESENT ILLNESS    HPI  Aria Richmond is a 50 y.o. female who presents to the emergency department for evaluation of COVID-19. Patient presenting with evaluation of multiple symptoms, chest discomfort,onproductive cough, fever, chills. She said she was diagnosed approximately 1-1/2 to 2 weeks ago of COVID-19. She states her symptoms were mainly sinus congestion runny nose and sore throat. She reports she had a fever last week. Since that time she has had worsening cough, chest pain with inspiration, and continued sinus pressure. Taking over-the-counter Tylenol as needed for pain. Mild nausea vomiting x1 today. The patient has no other acute complaints at this time. REVIEW OF SYSTEMS   Review of Systems   Constitutional: Positive for chills and fever. Negative for activity change. HENT: Positive for congestion, rhinorrhea and sinus pressure. Negative for sore throat. Eyes: Negative for redness and visual disturbance. Respiratory: Positive for cough and shortness of breath. Negative for chest tightness and wheezing. Cardiovascular: Positive for chest pain. Negative for palpitations and leg swelling. Gastrointestinal: Positive for abdominal pain, nausea and vomiting. Negative for abdominal distention, constipation and diarrhea. Endocrine: Negative for polydipsia and polyuria. Genitourinary: Negative for decreased urine volume, dysuria and urgency. Musculoskeletal: Negative for back pain and myalgias. Skin: Negative for pallor and rash.    Neurological: Negative for weakness, light-headedness and headaches. Hematological: Negative for adenopathy. Does not bruise/bleed easily. Psychiatric/Behavioral: Negative for self-injury and suicidal ideas. PAST MEDICAL AND SURGICAL HISTORY     Past Medical History:   Diagnosis Date    Diabetes mellitus type 1, uncontrolled ([facility] [address]     dx 18 years ago    Hypertension     Hypothyroidism      Past Surgical History:   Procedure Laterality Date     SECTION  ,     CHOLECYSTECTOMY  2010    ENDOMETRIAL ABLATION  2008    HYSTERECTOMY      PARTIAL HYSTERECTOMY  4/14/15    Dr. Abraham Frazier ovary present bilateral     SHOULDER SURGERY Left 2017    TUBAL LIGATION           MEDICATIONS     Current Facility-Administered Medications:     0.9 % sodium chloride infusion, 1,000 mL, Intravenous, Continuous, Irlanda Levi, DO    ondansetron [facility] injection 4 mg, 4 mg, Intravenous, Once, Irlanda Levi, DO    oxymetazoline (AFRIN) 0.05 % nasal spray 1 spray, 1 spray, Nasal, Once, Irlanda Levi, DO    Current Outpatient Medications:     insulin aspart (NOVOLOG) 100 UNIT/ML injection vial, USE AS DIRECTED PER PUMP SETTING, MAX   UNITS DAILY, Disp: 30 mL, Rfl: 5    SYNTHROID 200 MCG tablet, Take 1 tablet by mouth daily Take with water on an empty stomach- wait 30 minutes before eating or taking other meds. , Disp: 30 tablet, Rfl: 11    lisinopril (ZESTRIL) 2.5 MG tablet, Take 1 tablet by mouth daily, Disp: 90 tablet, Rfl: 3    Continuous Blood Gluc  (FREESTYLE DASHAWN 14 DAY READER) ALONZO, As directed, Disp: 1 Device, Rfl: 0    Continuous Blood Gluc Sensor (FREESTYLE DASHAWN 14 DAY SENSOR) MISC, As directed., Disp: 2 each, Rfl: 11    blood glucose test strips (SAMMI CONTOUR NEXT TEST) strip, Patient is to check blood sugar 4 times per day.   Dx: 250.01, Disp: 360 each, Rfl: 3    Lancets MISC, by Does not apply route., Disp: 120 each, Rfl: 3      SOCIAL HISTORY     Social History     Social History Narrative    Not on file     Social Skin is warm and dry. Capillary Refill: Capillary refill takes less than 2 seconds. Findings: No rash. Neurological:      Mental Status: She is alert and oriented to person, place, and time. She is not disoriented. GCS: GCS eye subscore is 4. GCS verbal subscore is 5. GCS motor subscore is 6. Cranial Nerves: No cranial nerve deficit. Sensory: No sensory deficit. Motor: No abnormal muscle tone or seizure activity. Coordination: Coordination normal.         Initial vital signs and nursing assessment reviewed and normal.   Pulsoximetry is normal per my interpretation. MEDICAL DECISION MAKING   Initial Assessment: Given the patient's above chief complaint and findings on history and physical examination, I thought it was appropriate to consider the following emergency medical conditions: COVID-19, Covid pneumonia, ACS, pulmonary embolus, bronchitis, pleurisy although some of these diagnoses are unlikely they were considered in my medical decision making. Plan: CBC, BMP, ECG, dimer, troponin, BNP, chest x-ray symptomatic treatment with Zofran, Afrin, Tylenol and reassess         ED RESULTS   Laboratory results:  Labs Reviewed   BASIC METABOLIC PANEL - Abnormal; Notable for the following components:       Result Value    Glucose 216 (*)     BUN 6 (*)     All other components within normal limits   HEPATIC FUNCTION PANEL - Abnormal; Notable for the following components:     Total Bilirubin 0.2 (*)     All other components within normal limits   D-DIMER, QUANTITATIVE - Abnormal; Notable for the following components:    D-Dimer, Quant 1029.00 (*)     All other components within normal limits   URINE WITH REFLEXED MICRO - Abnormal; Notable for the following components:    Glucose, Ur 100 (*)     Ketones, Urine TRACE (*)     Nitrite, Urine POSITIVE (*)     All other components within normal limits   CBC WITH AUTO DIFFERENTIAL   LIPASE   APTT   PROTIME-INR   TROPONIN   BRAIN NATRIURETIC PEPTIDE   ANION GAP   GLOMERULAR FILTRATION RATE, ESTIMATED   OSMOLALITY       Radiologic studies results:  CTA Chest W WO Contrast   Final Result      No gross pulmonary emboli. Mild nodular infiltrates bilaterally will need to be followed to complete clearing. **This report has been created using voice recognition software. It may contain minor errors which are inherent in voice recognition technology. **      Final report electronically signed by Dr. Sara Amor on 11/15/2020 8:05 PM      XR CHEST PORTABLE   Final Result   Mild pneumonia bilaterally. **This report has been created using voice recognition software. It may contain minor errors which are inherent in voice recognition technology. **      Final report electronically signed by Dr. Sara Amor on 11/15/2020 5:33 PM          ED Medications administered this visit:   Medications   0.9 % sodium chloride infusion (has no administration in time range)   ondansetron (ZOFRAN) injection 4 mg (has no administration in time range)   oxymetazoline (AFRIN) 0.05 % nasal spray 1 spray (has no administration in time range)         ED COURSE        Presents with with known covid for greater than a week. AF and hemodynamically stable. No acute distress. Pleuritic pain and elevated Dimer. CTA negative for emboli small infiltrates likely  Healing from coivd given prolonged symptoms will cover with doxy. ECG, troponin, BNP, and CBC acceptable ranges. The diagnosis, extensive differential diagnosis, laboratory and imaging findings were discussed at the bedside. The patient was an active participant in their care. They are agreeable to the plan of care. All questions and concerns were addressed at the time of the encounter.     MEDICATION CHANGES     DISCHARGE MEDICATIONS:  Discharge Medication List as of 11/15/2020  8:43 PM      START taking these medications    Details   zinc sulfate (ZINCATE) 220 (50 Zn) MG capsule Take 1 capsule by

## 2020-11-16 ENCOUNTER — TELEPHONE (OUTPATIENT)
Dept: FAMILY MEDICINE CLINIC | Age: 45
End: 2020-11-16

## 2020-11-16 PROCEDURE — 93010 ELECTROCARDIOGRAM REPORT: CPT | Performed by: NUCLEAR MEDICINE

## 2020-11-16 NOTE — ED NOTES
This RN taking over care of pt and received report from [facility]. Pt laying on cot with respirations easy and unlabored. Staff attempting to get 20 G for CTA. Will continue to monitor.       Tristin Aldrich RN  11/15/20 1914

## 2020-11-16 NOTE — ED NOTES
Pt up and ambulating to the bathroom. Pt respirations easy and unlabored.       Cole Henley RN  11/15/20 2014

## 2020-11-17 ENCOUNTER — HOSPITAL ENCOUNTER (EMERGENCY)
Age: 45
Discharge: HOME | End: 2020-11-17
Payer: COMMERCIAL

## 2020-11-17 VITALS
HEART RATE: 72 BPM | BODY MASS INDEX: 30.31 KG/M2 | SYSTOLIC BLOOD PRESSURE: 123 MMHG | DIASTOLIC BLOOD PRESSURE: 84 MMHG | HEIGHT: 68 IN | WEIGHT: 200 LBS | TEMPERATURE: 98.2 F | OXYGEN SATURATION: 99 % | RESPIRATION RATE: 18 BRPM

## 2020-11-17 LAB
ALBUMIN SERPL-MCNC: 3.8 G/DL (ref 3.5–5.1)
ALP BLD-CCNC: 106 U/L (ref 38–126)
ALT SERPL-CCNC: 27 U/L (ref 11–66)
AMPHETAMINE+METHAMPHETAMINE URINE SCREEN: NEGATIVE
ANION GAP SERPL CALCULATED.3IONS-SCNC: 8 MEQ/L (ref 8–16)
AST SERPL-CCNC: 24 U/L (ref 5–40)
BARBITURATE QUANTITATIVE URINE: NEGATIVE
BASOPHILS # BLD: 0.6 %
BASOPHILS ABSOLUTE: 0.1 THOU/MM3 (ref 0–0.1)
BENZODIAZEPINE QUANTITATIVE URINE: NEGATIVE
BETA-HYDROXYBUTYRATE: 6.35 MG/DL (ref 0.2–2.81)
BILIRUB SERPL-MCNC: 0.4 MG/DL (ref 0.3–1.2)
BILIRUBIN URINE: NEGATIVE
BLOOD, URINE: NEGATIVE
BUN BLDV-MCNC: 5 MG/DL (ref 7–22)
CALCIUM SERPL-MCNC: 9.2 MG/DL (ref 8.5–10.5)
CANNABINOID QUANTITATIVE URINE: NEGATIVE
CHARACTER, URINE: CLEAR
CHLORIDE BLD-SCNC: 103 MEQ/L (ref 98–111)
CO2: 27 MEQ/L (ref 23–33)
COCAINE METABOLITE QUANTITATIVE URINE: NEGATIVE
COLOR: YELLOW
CREAT SERPL-MCNC: 0.5 MG/DL (ref 0.4–1.2)
EOSINOPHIL # BLD: 0.1 %
EOSINOPHILS ABSOLUTE: 0 THOU/MM3 (ref 0–0.4)
ERYTHROCYTE [DISTWIDTH] IN BLOOD BY AUTOMATED COUNT: 13.5 % (ref 11.5–14.5)
ERYTHROCYTE [DISTWIDTH] IN BLOOD BY AUTOMATED COUNT: 43.8 FL (ref 35–45)
GFR SERPL CREATININE-BSD FRML MDRD: > 90 ML/MIN/1.73M2
GLUCOSE BLD-MCNC: 159 MG/DL (ref 70–108)
GLUCOSE URINE: NEGATIVE MG/DL
HCT VFR BLD CALC: 42 % (ref 37–47)
HEMOGLOBIN: 13.4 GM/DL (ref 12–16)
IMMATURE GRANS (ABS): 0.05 THOU/MM3 (ref 0–0.07)
IMMATURE GRANULOCYTES: 0.5 %
KETONES, URINE: 40
LEUKOCYTE ESTERASE, URINE: NEGATIVE
LIPASE: 7.9 U/L (ref 5.6–51.3)
LYMPHOCYTES # BLD: 13.8 %
LYMPHOCYTES ABSOLUTE: 1.4 THOU/MM3 (ref 1–4.8)
MCH RBC QN AUTO: 28.8 PG (ref 26–33)
MCHC RBC AUTO-ENTMCNC: 31.9 GM/DL (ref 32.2–35.5)
MCV RBC AUTO: 90.3 FL (ref 81–99)
MONOCYTES # BLD: 6 %
MONOCYTES ABSOLUTE: 0.6 THOU/MM3 (ref 0.4–1.3)
NITRITE, URINE: NEGATIVE
NUCLEATED RED BLOOD CELLS: 0 /100 WBC
OPIATES, URINE: NEGATIVE
OSMOLALITY CALCULATION: 276.3 MOSMOL/KG (ref 275–300)
OXYCODONE: NEGATIVE
PH UA: 6.5 (ref 5–9)
PHENCYCLIDINE QUANTITATIVE URINE: NEGATIVE
PLATELET # BLD: 267 THOU/MM3 (ref 130–400)
PMV BLD AUTO: 12.4 FL (ref 9.4–12.4)
POTASSIUM SERPL-SCNC: 4.2 MEQ/L (ref 3.5–5.2)
PROTEIN UA: NEGATIVE
RBC # BLD: 4.65 MILL/MM3 (ref 4.2–5.4)
SEG NEUTROPHILS: 79 %
SEGMENTED NEUTROPHILS ABSOLUTE COUNT: 8.1 THOU/MM3 (ref 1.8–7.7)
SODIUM BLD-SCNC: 138 MEQ/L (ref 135–145)
SPECIFIC GRAVITY, URINE: 1.01 (ref 1–1.03)
TOTAL PROTEIN: 6.9 G/DL (ref 6.1–8)
UROBILINOGEN, URINE: 1 EU/DL (ref 0–1)
WBC # BLD: 10.2 THOU/MM3 (ref 4.8–10.8)

## 2020-11-17 PROCEDURE — 6360000002 HC RX W HCPCS: Performed by: PHYSICIAN ASSISTANT

## 2020-11-17 PROCEDURE — 82010 KETONE BODYS QUAN: CPT

## 2020-11-17 PROCEDURE — 81003 URINALYSIS AUTO W/O SCOPE: CPT

## 2020-11-17 PROCEDURE — 99284 EMERGENCY DEPT VISIT MOD MDM: CPT

## 2020-11-17 PROCEDURE — 2500000003 HC RX 250 WO HCPCS: Performed by: PHYSICIAN ASSISTANT

## 2020-11-17 PROCEDURE — 85025 COMPLETE CBC W/AUTO DIFF WBC: CPT

## 2020-11-17 PROCEDURE — 6360000002 HC RX W HCPCS: Performed by: NURSE PRACTITIONER

## 2020-11-17 PROCEDURE — 96374 THER/PROPH/DIAG INJ IV PUSH: CPT

## 2020-11-17 PROCEDURE — 36415 COLL VENOUS BLD VENIPUNCTURE: CPT

## 2020-11-17 PROCEDURE — 80307 DRUG TEST PRSMV CHEM ANLYZR: CPT

## 2020-11-17 PROCEDURE — 2580000003 HC RX 258: Performed by: PHYSICIAN ASSISTANT

## 2020-11-17 PROCEDURE — 83690 ASSAY OF LIPASE: CPT

## 2020-11-17 PROCEDURE — 80053 COMPREHEN METABOLIC PANEL: CPT

## 2020-11-17 PROCEDURE — 96375 TX/PRO/DX INJ NEW DRUG ADDON: CPT

## 2020-11-17 RX ORDER — ONDANSETRON 2 MG/ML
4 INJECTION INTRAMUSCULAR; INTRAVENOUS ONCE
Status: COMPLETED | OUTPATIENT
Start: 2020-11-17 | End: 2020-11-17

## 2020-11-17 RX ORDER — METOCLOPRAMIDE 10 MG/1
10 TABLET ORAL 4 TIMES DAILY
Qty: 15 TABLET | Refills: 0 | Status: SHIPPED | OUTPATIENT
Start: 2020-11-17 | End: 2021-03-09

## 2020-11-17 RX ORDER — ONDANSETRON 4 MG/1
4 TABLET, ORALLY DISINTEGRATING ORAL EVERY 8 HOURS PRN
Qty: 10 TABLET | Refills: 0 | Status: SHIPPED | OUTPATIENT
Start: 2020-11-17 | End: 2021-03-09

## 2020-11-17 RX ORDER — FAMOTIDINE 20 MG/1
20 TABLET, FILM COATED ORAL 2 TIMES DAILY
Qty: 14 TABLET | Refills: 0 | Status: SHIPPED | OUTPATIENT
Start: 2020-11-17 | End: 2021-03-09

## 2020-11-17 RX ORDER — 0.9 % SODIUM CHLORIDE 0.9 %
1000 INTRAVENOUS SOLUTION INTRAVENOUS ONCE
Status: COMPLETED | OUTPATIENT
Start: 2020-11-17 | End: 2020-11-17

## 2020-11-17 RX ORDER — PROCHLORPERAZINE EDISYLATE 5 MG/ML
10 INJECTION INTRAMUSCULAR; INTRAVENOUS ONCE
Status: COMPLETED | OUTPATIENT
Start: 2020-11-17 | End: 2020-11-17

## 2020-11-17 RX ORDER — METOCLOPRAMIDE HYDROCHLORIDE 5 MG/ML
10 INJECTION INTRAMUSCULAR; INTRAVENOUS ONCE
Status: COMPLETED | OUTPATIENT
Start: 2020-11-17 | End: 2020-11-17

## 2020-11-17 RX ADMIN — FAMOTIDINE 20 MG: 10 INJECTION INTRAVENOUS at 19:48

## 2020-11-17 RX ADMIN — SODIUM CHLORIDE 1000 ML: 9 INJECTION, SOLUTION INTRAVENOUS at 19:48

## 2020-11-17 RX ADMIN — METOCLOPRAMIDE HYDROCHLORIDE 10 MG: 5 INJECTION INTRAMUSCULAR; INTRAVENOUS at 21:11

## 2020-11-17 RX ADMIN — ONDANSETRON 4 MG: 2 INJECTION INTRAMUSCULAR; INTRAVENOUS at 19:48

## 2020-11-17 RX ADMIN — PROCHLORPERAZINE EDISYLATE 10 MG: 5 INJECTION INTRAMUSCULAR; INTRAVENOUS at 23:03

## 2020-11-17 ASSESSMENT — PAIN DESCRIPTION - DESCRIPTORS: DESCRIPTORS: OTHER (COMMENT)

## 2020-11-17 ASSESSMENT — PAIN DESCRIPTION - ONSET: ONSET: ON-GOING

## 2020-11-17 ASSESSMENT — PAIN DESCRIPTION - LOCATION: LOCATION: ABDOMEN

## 2020-11-17 ASSESSMENT — PAIN DESCRIPTION - PAIN TYPE: TYPE: ACUTE PAIN

## 2020-11-17 ASSESSMENT — PAIN SCALES - GENERAL: PAINLEVEL_OUTOF10: 7

## 2020-11-17 ASSESSMENT — PAIN DESCRIPTION - ORIENTATION: ORIENTATION: MID;UPPER

## 2020-11-17 ASSESSMENT — PAIN DESCRIPTION - PROGRESSION: CLINICAL_PROGRESSION: NOT CHANGED

## 2020-11-17 ASSESSMENT — PAIN DESCRIPTION - FREQUENCY: FREQUENCY: INTERMITTENT

## 2020-11-18 ENCOUNTER — VIRTUAL VISIT (OUTPATIENT)
Dept: FAMILY MEDICINE CLINIC | Age: 45
End: 2020-11-18
Payer: COMMERCIAL

## 2020-11-18 PROCEDURE — 3052F HG A1C>EQUAL 8.0%<EQUAL 9.0%: CPT | Performed by: FAMILY MEDICINE

## 2020-11-18 PROCEDURE — 99214 OFFICE O/P EST MOD 30 MIN: CPT | Performed by: FAMILY MEDICINE

## 2020-11-18 PROCEDURE — G8428 CUR MEDS NOT DOCUMENT: HCPCS | Performed by: FAMILY MEDICINE

## 2020-11-18 PROCEDURE — 2022F DILAT RTA XM EVC RTNOPTHY: CPT | Performed by: FAMILY MEDICINE

## 2020-11-18 RX ORDER — AZITHROMYCIN 250 MG/1
TABLET, FILM COATED ORAL
Qty: 6 TABLET | Refills: 0 | Status: SHIPPED | OUTPATIENT
Start: 2020-11-18 | End: 2020-11-28

## 2020-11-18 ASSESSMENT — ENCOUNTER SYMPTOMS
ABDOMINAL PAIN: 0
NAUSEA: 0
GASTROINTESTINAL NEGATIVE: 1
COUGH: 1
SHORTNESS OF BREATH: 0
DIARRHEA: 0
CHEST TIGHTNESS: 0
VOMITING: 0

## 2020-11-18 NOTE — ED NOTES
Pt presents to ED with complaint of vomiting over previous \"few days\". Pt states she was diagnosed with COVID-19 on the 31st of last month and presented to ED recently for SOB. Pt states SOB has resolved and pt starting vomiting shortly after previous ED visit. Pt states \"rolling\" pain in upper middle abdomen at this time with pain rated . Pt states no other symptoms at this time. Pt does not appear to be in acute distress with family at bedside and call light within reach.        [facility]uthrie [facility]  11/17/20 1818

## 2020-11-18 NOTE — ED NOTES
Pt medicated per MAR. Pt given Ginger Ale and saltine crackers per ok from Balbina madison NP.      Agnieszka Nuno, ROBERT  11/17/20 0354

## 2020-11-18 NOTE — ED NOTES
Patient resting in bed. Respirations easy and unlabored. No distress noted. Call light within reach.      Cliff Phillips RN  11/17/20 2025

## 2020-11-18 NOTE — PROGRESS NOTES
acute distress. Appearance: Normal appearance. She is well-developed. She is not ill-appearing. HENT:      Head: Normocephalic and atraumatic. Right Ear: External ear normal.      Left Ear: External ear normal.   Eyes:      Conjunctiva/sclera: Conjunctivae normal.   Pulmonary:      Effort: Pulmonary effort is normal. No respiratory distress. Skin:     Findings: No rash (on exposed surfaces). Neurological:      Mental Status: She is alert and oriented to person, place, and time. Psychiatric:         Mood and Affect: Mood normal.         Behavior: Behavior normal.         Thought Content: Thought content normal.         Judgment: Judgment normal.         Assessment:       Diagnosis Orders   1. COVID-19     2. Pneumonia due to infectious organism, unspecified laterality, unspecified part of lung  azithromycin (ZITHROMAX Z-BENTLEY) 250 MG tablet   3. Acute nonintractable headache, unspecified headache type     4. Generalized body aches     5. Uncontrolled type 1 diabetes mellitus with hyperglycemia ([facility] [address]             Plan:      -  ED notes reviewed  -  DC doxy due to intolerability  -  rx Zpak sent  -  Monitor symptoms and sugars closely  -  RTO prn, will keep me updated    Due to this being a TeleHealth encounter (During KEUGI-85 public health emergency), evaluation of the following organ systems was limited: Vitals/Constitutional/EENT/Resp/CV/GI//MS/Neuro/Skin/Heme-Lymph-Imm. Pursuant to the emergency declaration under the [address] [place] [address], [address] [place] [address] and the [employer] and [employer] Act, this Virtual Visit was conducted with patient's (and/or legal guardian's) consent, to reduce the patient's risk of exposure to COVID-19 and provide necessary medical care.   The patient (and/or legal guardian) has also been advised to contact this office for worsening conditions or problems, and seek emergency medical treatment and/or call

## 2020-11-18 NOTE — ED NOTES
Patient resting in bed. Respirations easy and unlabored. No distress noted. Call light within reach.      Nighat Crawford RN  11/17/20 6105

## 2020-11-20 ASSESSMENT — ENCOUNTER SYMPTOMS
COUGH: 1
SHORTNESS OF BREATH: 1
RHINORRHEA: 0
DIARRHEA: 0
PHOTOPHOBIA: 0
ABDOMINAL PAIN: 0
BACK PAIN: 0
VOMITING: 1
SORE THROAT: 0
NAUSEA: 1

## 2020-11-20 NOTE — ED PROVIDER NOTES
[facility] EMERGENCY DEPT      CHIEF COMPLAINT       Chief Complaint   Patient presents with    Emesis       Nurses Notes reviewed and I agree except as noted in the HPI. HISTORY OF PRESENT ILLNESS    Aria Ho is a 50 y.o. female who presents for nausea and vomiting. Patient was diagnosed with Covid on 10-31. On 11-15 she was here for dyspnea. CT was performed and unremarkable. Her dyspnea has improved however since leaving she has had 2 days of persistent nausea and vomiting. She is an insulin-dependent diabetic but informs me that her blood sugars have been normal, last 1 being 155. She denies abdominal pain but motions to her upper abdomen saying it is upset. She has been unable to keep anything down, however urination is normal.  The patient endorses cough and postural lightheadedness. She denies chest pain, diarrhea, abdominal pain, changes in urination, chance of pregnancy, or any other complaints except as mentioned above in a 10 point review of systems. She does not smoke. REVIEW OF SYSTEMS     Review of Systems   Constitutional: Negative for appetite change, chills and fever. HENT: Negative for congestion, ear pain, rhinorrhea and sore throat. Eyes: Negative for photophobia. Respiratory: Positive for cough and shortness of breath (Improved). Cardiovascular: Negative for chest pain. Gastrointestinal: Positive for nausea and vomiting. Negative for abdominal pain and diarrhea. Endocrine: Negative for polyuria. Genitourinary: Negative for decreased urine volume, difficulty urinating, dysuria, flank pain and frequency. Musculoskeletal: Negative for back pain, gait problem and myalgias. Skin: Negative for rash. Neurological: Positive for light-headedness. Negative for dizziness, weakness, numbness and headaches. Psychiatric/Behavioral: Negative for confusion and sleep disturbance.         PAST MEDICAL HISTORY    has a past medical history of Diabetes mellitus alive.   family history includes Cancer in her father and mother; High Blood Pressure in her mother. SOCIAL HISTORY    reports that she has never smoked. She has never used smokeless tobacco. She reports previous alcohol use. She reports that she does not use drugs. PHYSICAL EXAM     INITIAL VITALS:  height is 5' 8\" (1.727 m) and weight is 200 lb (90.7 kg). Her oral temperature is 98.2 °F (36.8 °C). Her blood pressure is 123/84 and her pulse is 72. Her respiration is 18 and oxygen saturation is 99%. Physical Exam  Vitals signs and nursing note reviewed. Constitutional:       General: She is not in acute distress. Appearance: Normal appearance. She is well-developed. She is not toxic-appearing or diaphoretic. HENT:      Head: Normocephalic and atraumatic. Right Ear: Hearing normal.      Left Ear: Hearing normal.      Nose: Nose normal. No rhinorrhea. Mouth/Throat:      Lips: Pink. Mouth: Mucous membranes are moist.      Pharynx: Uvula midline. Eyes:      General: Lids are normal. No scleral icterus. Extraocular Movements: Extraocular movements intact. Conjunctiva/sclera: Conjunctivae normal.      Pupils: Pupils are equal, round, and reactive to light. Neck:      Musculoskeletal: No neck rigidity. Vascular: No JVD. Trachea: Trachea normal. No tracheal deviation. Cardiovascular:      Rate and Rhythm: Normal rate and regular rhythm. Heart sounds: Normal heart sounds. Pulmonary:      Effort: Pulmonary effort is normal. No respiratory distress. Breath sounds: Normal breath sounds. No decreased breath sounds or wheezing. Abdominal:      General: Bowel sounds are normal. There is no distension. Palpations: Abdomen is soft. Abdomen is not rigid. There is no pulsatile mass. Tenderness: There is no abdominal tenderness. There is no right CVA tenderness, left CVA tenderness, guarding or rebound.  Negative signs include Cedeno's sign and McBurney's sign.      Hernia: No hernia is present. Musculoskeletal: Normal range of motion. Comments: Movement normal as observed   Lymphadenopathy:      Cervical: No cervical adenopathy. Skin:     General: Skin is warm and dry. Coloration: Skin is not pale. Findings: No rash. Neurological:      General: No focal deficit present. Mental Status: She is alert and oriented to person, place, and time. Gait: Gait normal.   Psychiatric:         Mood and Affect: Mood normal.         Speech: Speech normal.         Behavior: Behavior normal.         Thought Content: Thought content normal.         Cognition and Memory: Cognition normal.         DIFFERENTIAL DIAGNOSIS:   Including but not limited to: ELENA, gastritis, gastroparesis, DKA, Covid pneumonia    DIAGNOSTIC RESULTS     EKG: All EKG's are interpreted by [facility] who either signs or Co-signs this chart in the absence of a cardiologist.  None    RADIOLOGY: non-plain film images(s) such as CT,Ultrasound and MRI are read by the radiologist.  Plain radiographic images are visualized and preliminarily interpreted by the emergency physician unless otherwise stated below.   No orders to display       LABS:   Labs Reviewed   CBC WITH AUTO DIFFERENTIAL - Abnormal; Notable for the following components:       Result Value    MCHC 31.9 (*)     Segs Absolute 8.1 (*)     All other components within normal limits   COMPREHENSIVE METABOLIC PANEL - Abnormal; Notable for the following components:    Glucose 159 (*)     BUN 5 (*)     All other components within normal limits   BETA-HYDROXYBUTYRATE - Abnormal; Notable for the following components:    Beta-Hydroxybutyrate 6.35 (*)     All other components within normal limits   URINE RT REFLEX TO CULTURE - Abnormal; Notable for the following components:    Ketones, Urine 40 (*)     All other components within normal limits   LIPASE   ANION GAP   GLOMERULAR FILTRATION RATE, ESTIMATED   OSMOLALITY URINE DRUG SCREEN       EMERGENCY DEPARTMENT COURSE:   Vitals:    Vitals:    11/17/20 1900 11/17/20 2023 11/17/20 2156 11/17/20 2258   BP: 134/71 (!) 124/59 (!) 119/57 123/84   Pulse: 78 67 63 72   Resp: 18 18 18 18   Temp: 98.2 °F (36.8 °C)      TempSrc: Oral      SpO2: 99% 98% 96% 99%   Weight: 200 lb (90.7 kg)      Height: 5' 8\" (1.727 m)          MDM:  The patient was seen and evaluated within the ED today for nausea and vomiting in the setting of Covid and type 1 diabetes. On exam, I appreciated a nontoxic-appearing patient with a soft and nontender abdomen. Old records were reviewed. Within the department, I observed the patient's vital signs to be within acceptable range. Radiological studies were not deemed necessary. Laboratory work was reassuring. Within the department, the patient was treated with IV fluids, Zofran, Pepcid, and Reglan. I observed the patient's condition to modestly improve during the duration of their stay. On re-exam the patient's abdomen was soft and non-tender, with no peritoneal signs. Vital signs remained stable. The patient remained non-toxic appearing with no distress evident in the ER. I have considered DKA and this patient's presentation is not consistent with such entities and therefore, no further testing was warranted. The patient was comfortable with the plan of discharge home and to follow up with [facility] emergency department if worse. Anticipatory guidance was given. The patient was instructed to return to the emergency department for any worsening of their symptoms. Patient was discharged from the emergency department in good condition with all questions answered. See disposition below. I have given the patient strict written and verbal instructions about care at home, follow-up, and signs and symptoms of worsening of condition and they did verbalize understanding.        CRITICAL CARE:   None    CONSULTS:  None    PROCEDURES:  None    FINAL IMPRESSION      1. Non-intractable vomiting with nausea, unspecified vomiting type    2. COVID-19 virus infection    3. Type 1 diabetes mellitus without complication ([facility])          DISPOSITION/PLAN     1. Non-intractable vomiting with nausea, unspecified vomiting type    2. COVID-19 virus infection    3.  Type 1 diabetes mellitus without complication ([facility] [address]        PATIENT REFERRED TO:  [facility] EMERGENCY DEPT  [address] [address]  [phone]    If symptoms worsen      DISCHARGE MEDICATIONS:  Discharge Medication List as of 11/17/2020  9:37 PM      START taking these medications    Details   ondansetron (ZOFRAN ODT) 4 MG disintegrating tablet Take 1 tablet by mouth every 8 hours as needed for Nausea, Disp-10 tablet,R-0Print      metoclopramide (REGLAN) 10 MG tablet Take 1 tablet by mouth 4 times daily When necessary for nausea or vomiting, Disp-15 tablet,R-0Print      famotidine (PEPCID) 20 MG tablet Take 1 tablet by mouth 2 times daily, Disp-14 tablet,R-0Print             (Please note that portions of this note were completed with a voice recognition program.  Efforts were made to edit the dictations but occasionally words are mis-transcribed.)    Mary Hassan PA-C 11/20/20 10:57 AM    ABE Ornelas PA-C  11/20/20 5808

## 2020-12-03 ENCOUNTER — TELEPHONE (OUTPATIENT)
Dept: FAMILY MEDICINE CLINIC | Age: 45
End: 2020-12-03

## 2020-12-03 ENCOUNTER — PATIENT MESSAGE (OUTPATIENT)
Dept: FAMILY MEDICINE CLINIC | Age: 45
End: 2020-12-03

## 2020-12-03 RX ORDER — HYDROCODONE POLISTIREX AND CHLORPHENIRAMINE POLISTIREX 10; 8 MG/5ML; MG/5ML
5 SUSPENSION, EXTENDED RELEASE ORAL EVERY 12 HOURS PRN
Qty: 120 ML | Refills: 0 | Status: SHIPPED | OUTPATIENT
Start: 2020-12-03 | End: 2020-12-15

## 2020-12-03 RX ORDER — HYDROCODONE POLISTIREX AND CHLORPHENIRAMINE POLISTIREX 10; 8 MG/5ML; MG/5ML
5 SUSPENSION, EXTENDED RELEASE ORAL EVERY 12 HOURS PRN
Qty: 120 ML | Refills: 0 | Status: SHIPPED | OUTPATIENT
Start: 2020-12-03 | End: 2020-12-03 | Stop reason: SDUPTHER

## 2020-12-03 NOTE — TELEPHONE ENCOUNTER
Pt on VM, still with cough that's not allowing her to sleep at hs. Asking if you will send in a cough syrup to [facility]. Pt would like a call back with advice.

## 2020-12-03 NOTE — TELEPHONE ENCOUNTER
From: Aria Saavedra  To: Vin Michele DO  Sent: 12/3/2020 1:53 PM EST  Subject: Visit Follow-Up Question    Hello, I left a message on the nurses line also. I have been seen a few times within the last 3 weeks. I have this cough that I cannot get rid of and having a hard time sleeping at night, was wondering if maybe I could get some cough medicine that may also help me sleep. Im exhausted.

## 2021-02-22 DIAGNOSIS — E10.65 UNCONTROLLED TYPE 1 DIABETES MELLITUS WITH HYPERGLYCEMIA (HCC): ICD-10-CM

## 2021-02-23 ENCOUNTER — TELEPHONE (OUTPATIENT)
Dept: FAMILY MEDICINE CLINIC | Age: 46
End: 2021-02-23

## 2021-02-23 NOTE — TELEPHONE ENCOUNTER
Please notify pt. Has she had a hard time filling the Novolog? If so, is she ok to switch to Humalog?

## 2021-02-23 NOTE — TELEPHONE ENCOUNTER
Fax received from pharmacy for PA Novolog 100 units/ml. PA initiated at [employer] and denied. This request cannot be processed due to the medication is not covered by the plan.      Please advise

## 2021-02-23 NOTE — TELEPHONE ENCOUNTER
A female with [employer] called office left  regarding this denial, says the Novolog is not covered by plan. If we have questions about this we can call them at [phone], reference# [id number].

## 2021-02-24 NOTE — TELEPHONE ENCOUNTER
[address] fax received, PA required on Humalog Insulin 10 ml vial.  PA submitted to cover my meds and explained per insurance this is a covered medication. Novolog has been denied. Office will receive a determination within 5 business days.

## 2021-02-24 NOTE — TELEPHONE ENCOUNTER
Pt notified and voiced understanding. Yes, willing to change to Humalog. If no call back pt will check with pharmacy after 12 noon today.

## 2021-02-26 NOTE — TELEPHONE ENCOUNTER
Called [place] [phone] spoke with [facility] was running Humalog for 30 for 25 days. Per insurance it needs to run 30 for 30 days. [facility] [address] notified and voiced understanding. She did get a paid claim. Pt notified that she will be able to  Humalog today.

## 2021-03-08 ENCOUNTER — NURSE ONLY (OUTPATIENT)
Dept: LAB | Age: 46
End: 2021-03-08

## 2021-03-08 ENCOUNTER — TELEPHONE (OUTPATIENT)
Dept: FAMILY MEDICINE CLINIC | Age: 46
End: 2021-03-08

## 2021-03-08 DIAGNOSIS — E03.9 HYPOTHYROIDISM, UNSPECIFIED TYPE: ICD-10-CM

## 2021-03-08 DIAGNOSIS — E10.65 UNCONTROLLED TYPE 1 DIABETES MELLITUS WITH HYPERGLYCEMIA (HCC): Primary | ICD-10-CM

## 2021-03-08 LAB
T4 FREE: 1.12 NG/DL (ref 0.93–1.76)
TSH SERPL DL<=0.05 MIU/L-ACNC: 6.77 UIU/ML (ref 0.4–4.2)

## 2021-03-08 NOTE — TELEPHONE ENCOUNTER
April with [facility] called office stating pt is there for her A1c and they do not have an order. She asked that you place a new one and she will check Epic later. Please advise.

## 2021-03-09 ENCOUNTER — OFFICE VISIT (OUTPATIENT)
Dept: FAMILY MEDICINE CLINIC | Age: 46
End: 2021-03-09
Payer: COMMERCIAL

## 2021-03-09 VITALS
TEMPERATURE: 97.1 F | RESPIRATION RATE: 16 BRPM | BODY MASS INDEX: 33.62 KG/M2 | SYSTOLIC BLOOD PRESSURE: 122 MMHG | HEART RATE: 84 BPM | DIASTOLIC BLOOD PRESSURE: 66 MMHG | WEIGHT: 221.1 LBS

## 2021-03-09 DIAGNOSIS — E78.00 PURE HYPERCHOLESTEROLEMIA: ICD-10-CM

## 2021-03-09 DIAGNOSIS — R80.9 MICROALBUMINURIA: ICD-10-CM

## 2021-03-09 DIAGNOSIS — E10.65 UNCONTROLLED TYPE 1 DIABETES MELLITUS WITH HYPERGLYCEMIA (HCC): Primary | ICD-10-CM

## 2021-03-09 DIAGNOSIS — E66.9 OBESITY (BMI 30-39.9): ICD-10-CM

## 2021-03-09 DIAGNOSIS — E03.9 HYPOTHYROIDISM, UNSPECIFIED TYPE: ICD-10-CM

## 2021-03-09 PROCEDURE — 1036F TOBACCO NON-USER: CPT | Performed by: FAMILY MEDICINE

## 2021-03-09 PROCEDURE — G8417 CALC BMI ABV UP PARAM F/U: HCPCS | Performed by: FAMILY MEDICINE

## 2021-03-09 PROCEDURE — G8427 DOCREV CUR MEDS BY ELIG CLIN: HCPCS | Performed by: FAMILY MEDICINE

## 2021-03-09 PROCEDURE — 99214 OFFICE O/P EST MOD 30 MIN: CPT | Performed by: FAMILY MEDICINE

## 2021-03-09 PROCEDURE — 2022F DILAT RTA XM EVC RTNOPTHY: CPT | Performed by: FAMILY MEDICINE

## 2021-03-09 PROCEDURE — 3046F HEMOGLOBIN A1C LEVEL >9.0%: CPT | Performed by: FAMILY MEDICINE

## 2021-03-09 PROCEDURE — G8484 FLU IMMUNIZE NO ADMIN: HCPCS | Performed by: FAMILY MEDICINE

## 2021-03-09 RX ORDER — LEVOTHYROXINE SODIUM 200 MCG
200 TABLET ORAL DAILY
Qty: 30 TABLET | Refills: 11 | Status: SHIPPED | OUTPATIENT
Start: 2021-03-09

## 2021-03-09 RX ORDER — LISINOPRIL 2.5 MG/1
2.5 TABLET ORAL DAILY
Qty: 90 TABLET | Refills: 3 | Status: SHIPPED | OUTPATIENT
Start: 2021-03-09

## 2021-03-09 SDOH — ECONOMIC STABILITY: FOOD INSECURITY: WITHIN THE PAST 12 MONTHS, THE FOOD YOU BOUGHT JUST DIDN'T LAST AND YOU DIDN'T HAVE MONEY TO GET MORE.: NEVER TRUE

## 2021-03-09 SDOH — ECONOMIC STABILITY: TRANSPORTATION INSECURITY
IN THE PAST 12 MONTHS, HAS THE LACK OF TRANSPORTATION KEPT YOU FROM MEDICAL APPOINTMENTS OR FROM GETTING MEDICATIONS?: NO

## 2021-03-09 ASSESSMENT — PATIENT HEALTH QUESTIONNAIRE - PHQ9
1. LITTLE INTEREST OR PLEASURE IN DOING THINGS: 0
SUM OF ALL RESPONSES TO PHQ QUESTIONS 1-9: 0
2. FEELING DOWN, DEPRESSED OR HOPELESS: 0
SUM OF ALL RESPONSES TO PHQ9 QUESTIONS 1 & 2: 0

## 2021-03-09 ASSESSMENT — ENCOUNTER SYMPTOMS
RESPIRATORY NEGATIVE: 1
GASTROINTESTINAL NEGATIVE: 1

## 2021-03-09 NOTE — PATIENT INSTRUCTIONS
You may receive a survey regarding the care you received during your visit. Your input is valuable to us. We encourage you to complete and return your survey. We hope you will choose us in the future for your healthcare needs. Patient Education        Learning About Type 2 Diabetes  What is type 2 diabetes? Insulin is a hormone that helps your body use sugar from your food as energy. Type 2 diabetes happens when your body can't use insulin the right way. Over time, the pancreas can't make enough insulin. If you don't have enough insulin, too much sugar stays in your blood. If you are overweight, get little or no exercise, or have type 2 diabetes in your family, you are more likely to have problems with the way insulin works in your body.  Americans, Hispanics, Native Americans,  Americans, and Pacific Islanders have a higher risk for type 2 diabetes. Type 2 diabetes can be prevented or delayed with a healthy lifestyle, which includes staying at a healthy weight, making smart food choices, and getting regular exercise. What can you expect with type 2 diabetes? Armando Brandt keep hearing about how important it is to keep your blood sugar within a target range. That's because over time, high blood sugar can lead to serious problems. It can:  · Harm your eyes, nerves, and kidneys. · Damage your blood vessels, leading to heart disease and stroke. · Reduce blood flow and cause nerve damage to parts of your body, especially your feet. This can cause slow healing and pain when you walk. · Make your immune system weak and less able to fight infections. When people hear the word \"diabetes,\" they often think of problems like these. But daily care and treatment can help prevent or delay these problems. The goal is to keep your blood sugar in a target range. That's the best way to reduce your chance of having more problems from diabetes. What are the symptoms?   Some people who have type 2 diabetes may not have any symptoms early on. Many people with the disease don't even know they have it at first. But with time, diabetes starts to cause symptoms. You experience most symptoms of type 2 diabetes when your blood sugar is either too high or too low. The most common symptoms of high blood sugar include:  · Thirst.  · Frequent urination. · Weight loss. · Blurry vision. The symptoms of low blood sugar include:  · Sweating. · Shakiness. · Weakness. · Hunger. · Confusion. How can you prevent type 2 diabetes? The best way to prevent or delay type 2 diabetes is to adopt healthy habits, which include:  · Staying at a healthy weight. · Exercising regularly. · Eating healthy foods. How is type 2 diabetes treated? If you have type 2 diabetes, here are the most important things you can do. · Take your diabetes medicines. · Check your blood sugar as often as your doctor recommends. Also, get a hemoglobin A1c test at least every 6 months. · Try to eat a variety of foods and to spread carbohydrate throughout the day. Carbohydrate raises blood sugar higher and more quickly than any other nutrient does. Carbohydrate is found in sugar, breads and cereals, fruit, starchy vegetables such as potatoes and corn, and milk and yogurt. · Get at least 30 minutes of exercise on most days of the week. Walking is a good choice. You also may want to do other activities, such as running, swimming, cycling, or playing tennis or team sports. If your doctor says it's okay, do muscle-strengthening exercises at least 2 times a week. · See your doctor for checkups and tests on a regular schedule. · If you have high blood pressure or high cholesterol, take the medicines as prescribed by your doctor. · Do not smoke. Smoking can make health problems worse. This includes problems you might have with type 2 diabetes. If you need help quitting, talk to your doctor about stop-smoking programs and medicines.  These can increase your chances of quitting for good. Follow-up care is a key part of your treatment and safety. Be sure to make and go to all appointments, and call your doctor if you are having problems. It's also a good idea to know your test results and keep a list of the medicines you take. Where can you learn more? Go to [email or web address] and sign in to your [employer] account. Enter T848 in the [employer] box to learn more about \"Learning About Type 2 Diabetes. \"     If you do not have an account, please click on the \"Sign Up Now\" link. Current as of: December 20, 2019               Content Version: 12.6  © 7436-7178 [employer]. Care instructions adapted under license by [facility] ([facility]). If you have questions about a medical condition or this instruction, always ask your healthcare professional. [address] any warranty or liability for your use of this information.

## 2021-03-09 NOTE — PROGRESS NOTES
3/9/2021    Aria Saavedra (:  1975) is a 48 y.o. female, here for a preventive medicine evaluation. Chief Complaint   Patient presents with    6 Month Follow-Up    Diabetes    Discuss Labs    Medication Refill     6 month eval.  Doing well overall. A1C pending at time of her visit. Per pt, she is convinced that she will not be well controlled. She is having large spikes in the am.    Lab Results   Component Value Date    LABA1C 8.6 (H) 09/10/2020    LABA1C 7.6 (H) 2020    LABA1C 7.7 (H) 2019     Lab Results   Component Value Date    LDLCALC 70 2019    CREATININE 0.5 2020     BPs stable. BP Readings from Last 3 Encounters:   21 122/66   20 123/84   11/15/20 (!) 136/54     Weight is up 20 lbs, has been very inactive since her COVID dx. Wt Readings from Last 3 Encounters:   21 221 lb 1.6 oz (100.3 kg)   20 200 lb (90.7 kg)   11/15/20 200 lb (90.7 kg)       Patient Active Problem List   Diagnosis    Diabetes mellitus type 1, uncontrolled ([facility]    Hypothyroid    Retinopathy of left eye    Microalbuminuria    Chronic constipation    Controlled type 1 diabetes mellitus without complication, with long-term current use of insulin ([facility])    Uncontrolled type 1 diabetes mellitus without complication       Review of Systems   Constitutional: Negative. HENT: Negative. Respiratory: Negative. Cardiovascular: Negative. Gastrointestinal: Negative. Musculoskeletal: Negative. All other systems reviewed and are negative. Prior to Visit Medications    Medication Sig Taking?  Authorizing Provider   lisinopril (ZESTRIL) 2.5 MG tablet Take 1 tablet by mouth daily Yes Genevieve Moreno DO   HUMALOG 100 UNIT/ML injection vial USE AS DIRECTED PER PUMP SETTING, MAX   UNITS DAILY Yes Genevieve Moreno DO   SYNTHROID 200 MCG tablet Take 1 tablet by mouth daily Take with water on an empty stomach- wait 30 minutes before eating or taking other meds. Yes Theodoro Dipti, DO   Continuous Blood Gluc  (FREESTYLE DASHAWN 14 DAY READER) ALONZO As directed Yes Theodoro Dipti, DO   Continuous Blood Gluc Sensor (FREESTYLE DASHAWN 14 DAY SENSOR) MISC As directed. Yes Theodoro Dipti, DO   blood glucose test strips (SAMMI CONTOUR NEXT TEST) strip Patient is to check blood sugar 4 times per day. Dx: 250.01 Yes Theodoro Dipti, DO   Lancets MISC by Does not apply route.  Yes Carlos Grant MD        Allergies   Allergen Reactions    Penicillins Hives    Influenza Vac Typ A&B Surf Ant      Rxn not specified    Morphine Nausea And Vomiting       Past Medical History:   Diagnosis Date    Diabetes mellitus type 1, uncontrolled ([address]     dx 18 years ago    Hypothyroidism        Past Surgical History:   Procedure Laterality Date     SECTION  ,     CHOLECYSTECTOMY  2010    ENDOMETRIAL ABLATION  2008    HYSTERECTOMY      PARTIAL HYSTERECTOMY  4/14/15    Dr. Dominic Stubbs ovary present bilateral     SHOULDER SURGERY Left 2017    TUBAL LIGATION         Social History     Socioeconomic History    Marital status:      Spouse name: Leslie Godinez Number of children: 3    Years of education: 15    Highest education level: Not on file   Occupational History     Comment: [employer] varied hrs 30-36 per week   Social Needs    Financial resource strain: Not hard at all   [place] insecurity     Worry: Never true     Inability: Never true   [employer] needs     Medical: No     Non-medical: No   Tobacco Use    Smoking status: Never Smoker    Smokeless tobacco: Never Used   Substance and Sexual Activity    Alcohol use: Not Currently     Comment: rarely    Drug use: No    Sexual activity: Yes   Lifestyle    Physical activity     Days per week: Not on file     Minutes per session: Not on file    Stress: Not on file   Relationships    Social connections     Talks on phone: Not on file     Gets together: Not on file     Attends Restoration service: Not on file     Active member of club or organization: Not on file     Attends meetings of clubs or organizations: Not on file     Relationship status: Not on file    Intimate partner violence     Fear of current or ex partner: Not on file     Emotionally abused: Not on file     Physically abused: Not on file     Forced sexual activity: Not on file   Other Topics Concern    Not on file   Social History Narrative    Not on file        Family History   Problem Relation Age of Onset    Cancer Mother         breast cancer    High Blood Pressure Mother     Cancer Father         bladder cancer with kayla       ADVANCE DIRECTIVE: N, <no information>    Vitals:    03/09/21 0746   BP: 122/66   Site: Left Upper Arm   Position: Sitting   Cuff Size: Large Adult   Pulse: 84   Resp: 16   Temp: 97.1 °F (36.2 °C)   TempSrc: Temporal   Weight: 221 lb 1.6 oz (100.3 kg)     Estimated body mass index is 33.62 kg/m² as calculated from the following:    Height as of 11/17/20: 5' 8\" (1.727 m). Weight as of this encounter: 221 lb 1.6 oz (100.3 kg). Physical Exam  Vitals signs and nursing note reviewed. Constitutional:       General: She is not in acute distress. Appearance: Normal appearance. She is well-developed. HENT:      Head: Normocephalic and atraumatic. Right Ear: Tympanic membrane normal.      Left Ear: Tympanic membrane normal.   Eyes:      Conjunctiva/sclera: Conjunctivae normal.   Neck:      Musculoskeletal: Neck supple. Cardiovascular:      Rate and Rhythm: Normal rate and regular rhythm. Heart sounds: Normal heart sounds. No murmur. Pulmonary:      Effort: Pulmonary effort is normal.      Breath sounds: Normal breath sounds. No wheezing, rhonchi or rales. Abdominal:      General: There is no distension. Skin:     General: Skin is warm and dry. Findings: No rash (on exposed surfaces).    Neurological:      General: No focal deficit present. Mental Status: She is alert. Psychiatric:         Attention and Perception: Attention normal.         Mood and Affect: Mood normal.         Speech: Speech normal.         Behavior: Behavior normal. Behavior is cooperative. Thought Content: Thought content normal.         Judgment: Judgment normal.         No flowsheet data found.     Lab Results   Component Value Date    CHOL 160 03/06/2019    CHOL 167 01/02/2018    CHOL 155 12/13/2016    CHOL 130 11/06/2013    TRIG 54 03/06/2019    TRIG 55 01/02/2018    TRIG 46 12/13/2016    HDL 78.8 03/06/2019    HDL 69 01/02/2018    HDL 62 12/13/2016    LDLCALC 70 03/06/2019    LDLCALC 87 01/02/2018    LDLCALC 84 12/13/2016    GLUCOSE 159 11/17/2020    GLUCOSE 47 03/06/2019    LABA1C 8.6 09/10/2020    LABA1C 7.6 01/30/2020    LABA1C 7.7 09/26/2019    LABA1C 8.3 06/06/2019    LABA1C 7.3 03/06/2019    LABA1C 8.7 07/09/2018       The 10-year ASCVD risk score (Jen Leon, et al., 2013) is: 0.8%    Values used to calculate the score:      Age: 48 years      Sex: Female      Is Non- : No      Diabetic: Yes      Tobacco smoker: No      Systolic Blood Pressure: 589 mmHg      Is BP treated: No      HDL Cholesterol: 78.8 mg/dL      Total Cholesterol: 160 mg/dL    Immunization History   Administered Date(s) Administered    Influenza Virus Vaccine 04/13/2017    Influenza, Erikaall Hook, IM, (6 mo and older Fluzone, Flulaval, Fluarix and 3 yrs and older Afluria) 09/25/2017, 12/06/2018    Influenza, Quadv, IM, PF (6 mo and older Fluzone, Flulaval, Fluarix, and 3 yrs and older Afluria) 09/12/2019       Health Maintenance   Topic Date Due    Hepatitis C screen  Never done    Pneumococcal 0-64 years Vaccine (1 of 1 - PPSV23) Never done    HIV screen  Never done    COVID-19 Vaccine (1 of 2) Never done    Hepatitis B vaccine (1 of 3 - Risk 3-dose series) Never done    DTaP/Tdap/Td vaccine (1 - Tdap) Never done    Cervical cancer screen  07/22/2018  Diabetic microalbuminuria test  03/06/2020    Lipid screen  03/06/2020    Flu vaccine (1) 09/01/2020    A1C test (Diabetic or Prediabetic)  09/10/2021    Potassium monitoring  11/17/2021    Creatinine monitoring  11/17/2021    Diabetic retinal exam  02/05/2022    TSH testing  03/08/2022    Diabetic foot exam  03/09/2022    Hepatitis A vaccine  Aged Out    Hib vaccine  Aged Out    Meningococcal (ACWY) vaccine  Aged Out       ASSESSMENT/PLAN:  1. Uncontrolled type 1 diabetes mellitus with hyperglycemia (HCC)  -      DIABETES FOOT EXAM  -     Hemoglobin A1C; Future  2. Microalbuminuria  -     lisinopril (ZESTRIL) 2.5 MG tablet; Take 1 tablet by mouth daily, Disp-90 tablet, R-3Normal  -     Microalbumin / Creatinine Urine Ratio; Future  3. Hypothyroidism, unspecified type  -     TSH with Reflex; Future  4. Obesity (BMI 30-39.9)  5. Pure hypercholesterolemia  -     Lipid Panel w/ Reflex Direct LDL; Future    -  Chronic medical problems stable  -  Labs reviewed, A1C pending at time of visit  -  TSH elevated, pt has been out of meds  -  Continue current medications, stressed compliance  -  Follow up with specialists as scheduled, will see [facility]  -  Check labs 3 mos      Return in about 3 months (around 6/9/2021) for IDDM. An electronic signature was used to authenticate this note.     --Ander Espino, DO on 3/9/2021 at 8:05 AM

## 2021-03-09 NOTE — PROGRESS NOTES
Chronic Disease Visit Information    BP Readings from Last 3 Encounters:   03/09/21 122/66   11/17/20 123/84   11/15/20 (!) 136/54          Hemoglobin A1C (%)   Date Value   09/10/2020 8.6 (H)   01/30/2020 7.6 (H)   09/26/2019 7.7 (H)   06/06/2019 8.3 (H)   03/06/2019 7.3 (H)   07/09/2018 8.7 (H)     LDL Calculated (mg/dL)   Date Value   03/06/2019 70     HDL (mg/dL)   Date Value   03/06/2019 78.8     BUN (mg/dL)   Date Value   11/17/2020 5 (L)     CREATININE (mg/dL)   Date Value   11/17/2020 0.5     Glucose (mg/dL)   Date Value   11/17/2020 159 (H)   03/06/2019 47 (L)            Have you changed or started any medications since your last visit including any over-the-counter medicines, vitamins, or herbal medicines? no   Are you having any side effects from any of your medications? -  no  Have you stopped taking any of your medications? Is so, why? -  yes - see updated med list    Have you seen any other physician or provider since your last visit? No  Have you had any other diagnostic tests since your last visit? Yes - Records Obtained  Have you been seen in the emergency room and/or had an admission to a hospital since we last saw you? No  Have you had your annual diabetic retinal (eye) exam? Yes - Records Requested  Have you had your routine dental cleaning in the past 6 months? no    Have you activated your [employer] account? If not, what are your barriers?  Yes     Patient Care Team:  Miguel Martin DO as PCP - General (Family Medicine)  Miguel Martin DO as PCP - [facility]  Jean Paul Jacobo MD as Consulting Physician (Endocrinology)         Medical History Review  Past Medical, Family, and Social History reviewed and does contribute to the patient presenting condition    Health Maintenance   Topic Date Due    Hepatitis C screen  Never done    Pneumococcal 0-64 years Vaccine (1 of 1 - PPSV23) Never done    HIV screen  Never done    COVID-19 Vaccine (1 of 2) Never done    Hepatitis B vaccine (1 of 3 - Risk 3-dose series) Never done    DTaP/Tdap/Td vaccine (1 - Tdap) Never done    Cervical cancer screen  07/22/2018    Diabetic microalbuminuria test  03/06/2020    Lipid screen  03/06/2020    Flu vaccine (1) 09/01/2020    Diabetic foot exam  01/30/2021    A1C test (Diabetic or Prediabetic)  09/10/2021    Potassium monitoring  11/17/2021    Creatinine monitoring  11/17/2021    Diabetic retinal exam  02/05/2022    TSH testing  03/08/2022    Hepatitis A vaccine  Aged Out    Hib vaccine  Aged Out    Meningococcal (ACWY) vaccine  Aged Out

## 2021-04-09 ENCOUNTER — NURSE ONLY (OUTPATIENT)
Dept: LAB | Age: 46
End: 2021-04-09

## 2021-05-13 ENCOUNTER — OFFICE VISIT (OUTPATIENT)
Dept: INTERNAL MEDICINE CLINIC | Age: 46
End: 2021-05-13
Payer: COMMERCIAL

## 2021-05-13 DIAGNOSIS — E10.65 UNCONTROLLED TYPE 1 DIABETES MELLITUS WITH HYPERGLYCEMIA (HCC): ICD-10-CM

## 2021-05-13 PROCEDURE — G0108 DIAB MANAGE TRN  PER INDIV: HCPCS | Performed by: INTERNAL MEDICINE

## 2021-05-13 NOTE — PROGRESS NOTES
[facility]  [address] [facility], [address]  [phone] (phone)  [phone] (fax)    Patient ID: Dia Travis [id number]  Referring Provider: Dr. Tawny Zepeda    Patient's name and  were verified. Subjective:    She presents for Her follow-up diabetic visit. She has type 1 diabetes mellitus. Home regimen includes: insulin She is noncompliant some of the time. Assessment:     Lab Results   Component Value Date    LABA1C 8.6 09/10/2020    LABA1C 8.3 2019    BUN 5 2020    CREATININE 0.5 2020     There were no vitals filed for this visit. Wt Readings from Last 3 Encounters:   21 221 lb 1.6 oz (100.3 kg)   20 200 lb (90.7 kg)   11/15/20 200 lb (90.7 kg)     Ht Readings from Last 3 Encounters:   20 5' 8\" (1.727 m)   11/15/20 5' 8\" (1.727 m)   09/10/20 5' 7\" (1.702 m)       Current monitoring regimen: home blood tests - 4 times daily; Fior CGM  Home blood sugar trends: 5-6am [id number]. Noon . Dinner [phone]. [phone]  Any episodes of hypoglycemia? yes - less than once weekly  Previous visit with dietician: yes - working with [employer] [facility] until the first of the year. New insurance now  Current diet: B skip                     L packed lunch lean cuisine protein bowl                     D bowl Latter-day oatmeal w/ fruit                     Random snacks at work wheat thins  Current exercise: Eliptical 2 times per week; 30 mins. Walking at lunch most days  Eye exam current (within one year): yes 21  Any history of foot problems? no  Last foot exam: 21  Immunizations up to date: yes -   Taking ASA:  No - If not why? Not indicated  Appropriate for use of MyChart Glucose Grid:  Yes    Focus:     Diabetes education. Radha has a order for an A1C and other labs that she reports she will obtain in the next week. Aria reports having COVID 2020. She has been struggling since with how she is feeling physically and emotionally.  Blood sugars are elevated. Weight is stable. Discussed insulin setting r/t glucose control. Much encouragement given. Follow up 2 weeks with dietician-meal planning/ carb counting. DSME PLAN:   Discussed general issues about diabetes pathophysiology and management. Counseling at today's visit: BG goals; carb/ carb counts; exercise; stress management/relaxation. Basal rates 12am 1.1 units, 5am 1.3 units, 7pm 1.5 units, 10pm 1.3 units  Carb ratio 12am 7 grams, 6:30am 4.5 grams, 6pm 4 grams, 10pm 7 grams  Sensitivity 12am 40 mg, 1pm 40mg  Think about relaxation techniques; deep breathing. Make sure to keep exercising--more is better. Ideal--a balance of aerobic and strength training  Talk with Dr. Patty Singleton about how you are feeling/ ask about Thyroid tests  We will inquire about OmniPod and Dexcom   Meter download, medications, PMH and nursing assessment reviewed. [address] 65 states She is willing to participate in this plan of care and verbalized understanding of all instructions provided. Teach back used to verify comprehension. Total time involved in direct patient education: 60 minutes.

## 2021-05-17 ENCOUNTER — TELEPHONE (OUTPATIENT)
Dept: INTERNAL MEDICINE CLINIC | Age: 46
End: 2021-05-17

## 2021-05-17 VITALS
SYSTOLIC BLOOD PRESSURE: 139 MMHG | HEIGHT: 67 IN | BODY MASS INDEX: 35.22 KG/M2 | WEIGHT: 224.4 LBS | TEMPERATURE: 97.6 F | DIASTOLIC BLOOD PRESSURE: 66 MMHG | HEART RATE: 87 BPM

## 2021-05-17 NOTE — TELEPHONE ENCOUNTER
Aria instructed on the insulin pump setting adjustments below as directed. Basal rates 12am 1.0 units --incr1.1 units                       5am 1.3 units                       7pm 1.5 units                       10pm 1.2 units  --incr 1.3 units  Carb ratio 12am 7 grams                   6:30am 5 grams  --incr 4.5 grams                   6pm 4.5 grams  --incr 4  grams                   10pm 7.5 grams  --incr 7 grams  Sensitivity 12am 40 mg, 1pm 40mg   Aria voiced understanding via teach back.

## 2021-05-17 NOTE — TELEPHONE ENCOUNTER
Diabetes education. Medtronic 670G insulin pump in place. 5-6am [id number]. Noon . Dinner [phone]. [phone]      Dr. Tressa Hooks,      Please authorize the [facility] at [address] [place] [address] to teach/ assist Crystal to make the following insulin pump setting adjustments:  Basal rates 12am 1.0 units --incr1.1 units                       5am 1.3 units                       7pm 1.5 units                       10pm 1.2 units  --incr 1.3 units  Carb ratio 12am 7 grams                   6:30am 5 grams  --incr 4.5 grams                   6pm 4.5 grams  --incr 4  grams                   10pm 7.5 grams  --incr 7 grams  Sensitivity 12am 40 mg, 1pm 40mg   If you agree, please note and return. Thank you.

## 2021-06-03 ENCOUNTER — OFFICE VISIT (OUTPATIENT)
Dept: INTERNAL MEDICINE CLINIC | Age: 46
End: 2021-06-03
Payer: COMMERCIAL

## 2021-06-03 VITALS — BODY MASS INDEX: 35.16 KG/M2 | HEIGHT: 67 IN | TEMPERATURE: 98.1 F | WEIGHT: 224 LBS

## 2021-06-03 DIAGNOSIS — E10.65 UNCONTROLLED TYPE 1 DIABETES MELLITUS WITH HYPERGLYCEMIA (HCC): ICD-10-CM

## 2021-06-03 PROCEDURE — 97802 MEDICAL NUTRITION INDIV IN: CPT | Performed by: DIETITIAN, REGISTERED

## 2021-06-03 NOTE — PROGRESS NOTES
[address] [place] [address]. [address] [place] [address] Billy., [facility], [facility]  [phone] (phone)  [phone] (fax)    Patient Name: Denise Mobley Date of Birth: [birth year]. MRN: [id number]      Assessment: Patient is a 48 y.o. female seen for Initial MNT visit for Type 2 DB.     -Nutritionally relevant labs:   Lab Results   Component Value Date/Time    LABA1C 8.6 (H) 09/10/2020 07:11 AM    LABA1C 7.6 (H) 01/30/2020 07:43 AM    LABA1C 7.7 (H) 09/26/2019 11:23 AM    LABA1C 8.3 (H) 06/06/2019 07:25 AM    LABA1C 7.3 (H) 03/06/2019 07:30 AM    LABA1C 8.7 (H) 07/09/2018 07:25 AM    GLUCOSE 159 (H) 11/17/2020 07:45 PM    GLUCOSE 216 (H) 11/15/2020 04:40 PM    GLUCOSE 47 (L) 03/06/2019 07:30 AM    GLUCOSE 170 (H) 01/02/2018 12:00 PM    CHOL 160 03/06/2019 07:30 AM    CHOL 130 11/06/2013 12:00 AM    HDL 78.8 03/06/2019 07:30 AM    LDLCALC 70 03/06/2019 07:30 AM    TRIG 54 03/06/2019 07:30 AM     -Blood sugar trends: Pt was here 3 weeks ago with Geraldine Nicole RN-CDE for pump assessment and CGM review. Pt stated she is here today to prevent weight gain and help her with her weight loss efforts. Pt is premenopausal.    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 Pt uses a calorie andrew book to look up carb information on foods.  -Food recall:   Breakfast: Skips - Coffee with cream and 8 oz water. Lunch: Healthy Choice protein bowl OR Wheat thins and chicken salad OR turkey sandwich, chips and diet dr moreno   Dinner: Mardell Solum, cheese OR Healthy choice steak bowl OR Ground steak with mushrooms, onions, salad with emre, tomatoes, eggs, cheese OR Tomato soup and grilled cheese OR chicken breast, green beans and baked potato. Snacks: occ will eat cucumbers and dill dip. Pt states she loves avocado slices and salt. -Main Beverages: water. Knows she does not drink enough water.   Likes to eat out on the weekends - chinese or casa luau    -Impression of Dietary Intake: in general, a \"healthy\" diet  , on average, 2 meals per day. Current Outpatient Medications on File Prior to Visit   Medication Sig Dispense Refill    HUMALOG 100 UNIT/ML injection vial USE AS DIRECTED PER PUMP SETTING, MAX   UNITS DAILY 30 mL 5    Continuous Blood Gluc  (FREESTYLE DASHAWN 14 DAY READER) ALONZO As directed 1 Device 0    Continuous Blood Gluc Sensor (FREESTYLE DASHAWN 14 DAY SENSOR) MISC As directed. 2 each 11    blood glucose test strips (SAMMI CONTOUR NEXT TEST) strip Patient is to check blood sugar 4 times per day. Dx: 250.01 360 each 3    Lancets MISC by Does not apply route. 120 each 3    lisinopril (ZESTRIL) 2.5 MG tablet Take 1 tablet by mouth daily 90 tablet 3    SYNTHROID 200 MCG tablet Take 1 tablet by mouth daily Take with water on an empty stomach- wait 30 minutes before eating or taking other meds. 30 tablet 11     No current facility-administered medications on file prior to visit. Vitals from current and previous visits:  Temp 98.1 °F (36.7 °C)   Ht 5' 7\" (1.702 m)   Wt 224 lb (101.6 kg)   LMP 04/21/2014   BMI 35.08 kg/m²     -Body mass index is 35.08 kg/m². 35-39.9 - Obesity Grade II.   -Weight goal: lose weight. Nutrition Diagnosis:   Food and nutrition-related knowledge deficit & Obesity related to Lack of previous MNT/currently undergoing MNT as evidenced by Conditions associated with a diagnosis or treatment: Type 2 DB and BMI 35. Intervention:  -Impression: Demonstrated myfitnesspal on the computer. She also currently using OnePIN andrew book.     -Instructed the patient on: Calorie Counting, Carbohydrate Counting, Food Label Reading, Meal Planning for Regular, Balanced Meals & Snacks and The Importance of Regular Physical ActivityLow fat guidelines, calorie guidelines    -Handouts given for: weight mgmt guide booklet, food logging (calorie/fat gram counting), healthy snacks, plate method, 451 gms sample menus, weight management tips and healthy brkf ideas. Patient Instructions   1.)  Get familiar with reading the nutrition facts label not only at the carbs you are consuming per serving size but also the calories and fat grams. 2.)  Measure foods for accuracy in carb counting, fat and calorie counting.    3.)  Healthy carb choices:  whole grains, whole wheat pasta, starchy vegetables, fresh fruit and lowfat/non-fat milk and yogurt. 4.)  Your meal plan is found on the inside cover of your carb counting guide booklet:  1st humberto or Brkf:  15-30 gms carbohydrates + 1-2 oz protein  2nd meal or Lunch: 30 - 45 gms carbohydrates + 2-3 oz protein + non-starchy veggies  3rd meal or Dinner:  45 gms carbohydrates + 2-3 oz protein + non- starchy veggies    Snack time:  15 - 20 gms carbohydrates + 1 oz protein    5.)  Choose lean protein most of the time and Cut in 1/2 added fats to help with weight loss efforts. 6.) Bring a 1-2 week food log to next dietitian appt OR your [email or web address] Username and password for printing food logging. BS goals:   Fasting BS (1st thing in the morning) or before a meal (at least 4 hour since last ate), BS goal:  90 - 130  2 hours after the start of a meal, BS goal:  100 - 150    7.)  Great job starting on your elliptical.  Eventual goal is getting in 150 minutes of physical activity each week. -Nutrition prescription: 1400 calories/day, 120 - 135 g carbs/day, <45 gms fat/day    Comprehension verified using teachback method. Monitoring/Evaluation:   -Followup visit: 7 weeks with dietitian.   -Receptiveness to education/goals: Agreeable.  -Evaluation of education: Indicates understanding.  -Readiness to change: contemplation - ambivalent about change logging her food intake or using Brightergy for food logging  -Expected compliance: good. Thank you for your referral of this patient. Total time involved in direct patient education: 45 minutes for initial MNT visit.

## 2021-06-03 NOTE — PATIENT INSTRUCTIONS
1. )  Get familiar with reading the nutrition facts label not only at the carbs you are consuming per serving size but also the calories and fat grams. 2.)  Measure foods for accuracy in carb counting, fat and calorie counting.    3.)  Healthy carb choices:  whole grains, whole wheat pasta, starchy vegetables, fresh fruit and lowfat/non-fat milk and yogurt. 4.)  Your meal plan is found on the inside cover of your carb counting guide booklet:  1st humberto or Brkf:  15-30 gms carbohydrates + 1-2 oz protein  2nd meal or Lunch: 30 - 45 gms carbohydrates + 2-3 oz protein + non-starchy veggies  3rd meal or Dinner:  45 gms carbohydrates + 2-3 oz protein + non- starchy veggies    Snack time:  15 - 20 gms carbohydrates + 1 oz protein    5.)  Choose lean protein most of the time and Cut in 1/2 added fats to help with weight loss efforts. 6.) Bring a 1-2 week food log to next dietitian appt OR your [email or web address] Username and password for printing food logging. BS goals:   Fasting BS (1st thing in the morning) or before a meal (at least 4 hour since last ate), BS goal:  90 - 130  2 hours after the start of a meal, BS goal:  100 - 150    7.)  Great job starting on your elliptical.  Eventual goal is getting in 150 minutes of physical activity each week.

## 2021-07-07 ENCOUNTER — NURSE ONLY (OUTPATIENT)
Dept: LAB | Age: 46
End: 2021-07-07

## 2021-07-07 DIAGNOSIS — E03.9 HYPOTHYROIDISM, UNSPECIFIED TYPE: ICD-10-CM

## 2021-07-07 DIAGNOSIS — E10.65 UNCONTROLLED TYPE 1 DIABETES MELLITUS WITH HYPERGLYCEMIA (HCC): ICD-10-CM

## 2021-07-07 DIAGNOSIS — R80.9 MICROALBUMINURIA: ICD-10-CM

## 2021-07-07 DIAGNOSIS — E78.00 PURE HYPERCHOLESTEROLEMIA: ICD-10-CM

## 2021-07-07 LAB
AVERAGE GLUCOSE: 180 MG/DL (ref 70–126)
CHOLESTEROL, TOTAL: 170 MG/DL (ref 100–199)
HBA1C MFR BLD: 8 % (ref 4.4–6.4)
HDLC SERPL-MCNC: 64 MG/DL
LDL CHOLESTEROL CALCULATED: 92 MG/DL
TRIGL SERPL-MCNC: 72 MG/DL (ref 0–199)
TSH SERPL DL<=0.05 MIU/L-ACNC: 3.07 UIU/ML (ref 0.4–4.2)

## 2021-07-08 ENCOUNTER — OFFICE VISIT (OUTPATIENT)
Dept: FAMILY MEDICINE CLINIC | Age: 46
End: 2021-07-08
Payer: COMMERCIAL

## 2021-07-08 VITALS
DIASTOLIC BLOOD PRESSURE: 76 MMHG | HEIGHT: 67 IN | RESPIRATION RATE: 18 BRPM | WEIGHT: 226 LBS | BODY MASS INDEX: 35.47 KG/M2 | HEART RATE: 78 BPM | OXYGEN SATURATION: 97 % | SYSTOLIC BLOOD PRESSURE: 118 MMHG

## 2021-07-08 DIAGNOSIS — N95.1 PERIMENOPAUSAL: ICD-10-CM

## 2021-07-08 DIAGNOSIS — E66.9 OBESITY (BMI 30-39.9): ICD-10-CM

## 2021-07-08 DIAGNOSIS — E78.00 PURE HYPERCHOLESTEROLEMIA: ICD-10-CM

## 2021-07-08 DIAGNOSIS — E03.9 HYPOTHYROIDISM, UNSPECIFIED TYPE: ICD-10-CM

## 2021-07-08 DIAGNOSIS — E10.65 UNCONTROLLED TYPE 1 DIABETES MELLITUS WITH HYPERGLYCEMIA (HCC): Primary | ICD-10-CM

## 2021-07-08 DIAGNOSIS — R80.9 MICROALBUMINURIA: ICD-10-CM

## 2021-07-08 PROCEDURE — G8427 DOCREV CUR MEDS BY ELIG CLIN: HCPCS | Performed by: FAMILY MEDICINE

## 2021-07-08 PROCEDURE — 99214 OFFICE O/P EST MOD 30 MIN: CPT | Performed by: FAMILY MEDICINE

## 2021-07-08 PROCEDURE — 1036F TOBACCO NON-USER: CPT | Performed by: FAMILY MEDICINE

## 2021-07-08 PROCEDURE — 2022F DILAT RTA XM EVC RTNOPTHY: CPT | Performed by: FAMILY MEDICINE

## 2021-07-08 PROCEDURE — G8417 CALC BMI ABV UP PARAM F/U: HCPCS | Performed by: FAMILY MEDICINE

## 2021-07-08 PROCEDURE — 3052F HG A1C>EQUAL 8.0%<EQUAL 9.0%: CPT | Performed by: FAMILY MEDICINE

## 2021-07-08 RX ORDER — VENLAFAXINE HYDROCHLORIDE 37.5 MG/1
37.5 CAPSULE, EXTENDED RELEASE ORAL DAILY
Qty: 14 CAPSULE | Refills: 0 | Status: SHIPPED | OUTPATIENT
Start: 2021-07-08 | End: 2021-07-22

## 2021-07-08 ASSESSMENT — ENCOUNTER SYMPTOMS
RESPIRATORY NEGATIVE: 1
GASTROINTESTINAL NEGATIVE: 1

## 2021-07-08 NOTE — PROGRESS NOTES
2021    Aria Saavedra (:  1975) is a 48 y.o. female, here for a preventive medicine evaluation. Chief Complaint   Patient presents with    3 Month Follow-Up     DM      3 month eval.    Sugars have improved slightly. Recently started Dexcom and loves it. The Algie Messier was inaccurate. Lab Results   Component Value Date    LABA1C 8.0 (H) 2021    LABA1C 8.6 (H) 09/10/2020    LABA1C 7.6 (H) 2020     Lab Results   Component Value Date    LDLCALC 92 2021    CREATININE 0.5 2020     BPs and weight stable. BP Readings from Last 3 Encounters:   21 118/76   21 139/66   21 122/66     Wt Readings from Last 3 Encounters:   21 226 lb (102.5 kg)   21 224 lb (101.6 kg)   21 224 lb 6.4 oz (101.8 kg)     Pt c/o hot flashes and night sweats over several months. Hx of AUDREY, ovaries intact. She is more fatigued. Feels that she is going through menopause. Patient Active Problem List   Diagnosis    Diabetes mellitus type 1, uncontrolled ([address]    Hypothyroid    Retinopathy of left eye    Microalbuminuria    Chronic constipation    Controlled type 1 diabetes mellitus without complication, with long-term current use of insulin (HCC)    Uncontrolled type 1 diabetes mellitus without complication       Review of Systems   Constitutional: Negative. HENT: Negative. Respiratory: Negative. Cardiovascular: Negative. Gastrointestinal: Negative. Endocrine:        Hot flashes, night sweats   Musculoskeletal: Negative. Psychiatric/Behavioral: Positive for agitation and behavioral problems. All other systems reviewed and are negative. Prior to Visit Medications    Medication Sig Taking?  Authorizing Provider   venlafaxine (EFFEXOR XR) 37.5 MG extended release capsule Take 1 capsule by mouth daily Yes Gaye Pair, DO   lisinopril (ZESTRIL) 2.5 MG tablet Take 1 tablet by mouth daily Yes Gaye Pair, DO   SYNTHROID 200 MCG tablet Take 1 tablet by mouth daily Take with water on an empty stomach- wait 30 minutes before eating or taking other meds. Yes Sharita Tapia, DO   HUMALOG 100 UNIT/ML injection vial USE AS DIRECTED PER PUMP SETTING, MAX   UNITS DAILY Yes Sharita Tapia DO   blood glucose test strips (SAMMI CONTOUR NEXT TEST) strip Patient is to check blood sugar 4 times per day.   Dx: 250.01 Yes Sharita Tapia DO        Allergies   Allergen Reactions    Latex     Penicillins Hives    Influenza Vac Typ A&B Surf Ant      Rxn not specified    Iodine     Morphine Nausea And Vomiting       Past Medical History:   Diagnosis Date    Diabetes mellitus type 1, uncontrolled ([facility]     dx 18 years ago    Hypothyroidism        Past Surgical History:   Procedure Laterality Date     SECTION  , 2006    CHOLECYSTECTOMY  2010    ENDOMETRIAL ABLATION  2008    HYSTERECTOMY      PARTIAL HYSTERECTOMY  4/14/15    Dr. Abraham Frazier ovary present bilateral     SHOULDER SURGERY Left 2017    TUBAL LIGATION  2006       Social History     Socioeconomic History    Marital status:      Spouse name: Tre Canseco Number of children: 3    Years of education: 15    Highest education level: Not on file   Occupational History     Comment: [employer] varied hrs 30-36 per week   Tobacco Use    Smoking status: Never Smoker    Smokeless tobacco: Never Used   Vaping Use    Vaping Use: Never used   Substance and Sexual Activity    Alcohol use: Not Currently     Comment: rarely    Drug use: No    Sexual activity: Yes   Other Topics Concern    Not on file   Social History Narrative    Not on file     Social Determinants of Health     Financial Resource Strain: Low Risk     Difficulty of Paying Living Expenses: Not hard at all   Food Insecurity: No Food Insecurity    Worried About Running Out of Food in the Last Year: Never true    Wilder of Food in the Last Year: Never true   Transportation Needs: No of 2 - PPSV23) Never done    HIV screen  Never done    Hepatitis B vaccine (1 of 3 - Risk 3-dose series) Never done    DTaP/Tdap/Td vaccine (1 - Tdap) Never done    Cervical cancer screen  07/22/2018    Diabetic microalbuminuria test  03/06/2020    Flu vaccine (1) 09/01/2021    Potassium monitoring  11/17/2021    Creatinine monitoring  11/17/2021    Diabetic retinal exam  02/05/2022    Diabetic foot exam  03/09/2022    A1C test (Diabetic or Prediabetic)  07/07/2022    Lipid screen  07/07/2022    TSH testing  07/07/2022    Colon cancer screen colonoscopy  04/09/2031    COVID-19 Vaccine  Completed    Hepatitis A vaccine  Aged Out    Hib vaccine  Aged Out    Meningococcal (ACWY) vaccine  Aged Out          ASSESSMENT/PLAN:  1. Uncontrolled type 1 diabetes mellitus with hyperglycemia (HCC)  -     Hemoglobin A1C; Future  -     Basic Metabolic Panel; Future  2. Microalbuminuria  3. Hypothyroidism, unspecified type  4. Obesity (BMI 30-39.9)  5. Pure hypercholesterolemia  6. Perimenopausal  -     venlafaxine (EFFEXOR XR) 37.5 MG extended release capsule; Take 1 capsule by mouth daily, Disp-14 capsule, R-0Normal    -  Chronic medical problems stable  -  Labs reviewed, slight improvement in A1C  -  Now with Dexcom which is helping   -  Continue current medications  -  Start Effexor low dose      Return in about 3 months (around 10/8/2021) for IDDM. Call office in 2 weeks with update on #6, plan increasing Effexor at that time if necessary. An electronic signature was used to authenticate this note.     --Sharita Tapia DO on 7/8/2021 at 6:14 PM

## 2021-07-21 ENCOUNTER — PATIENT MESSAGE (OUTPATIENT)
Dept: FAMILY MEDICINE CLINIC | Age: 46
End: 2021-07-21

## 2021-07-22 RX ORDER — VENLAFAXINE HYDROCHLORIDE 75 MG/1
75 CAPSULE, EXTENDED RELEASE ORAL DAILY
Qty: 30 CAPSULE | Refills: 0 | Status: SHIPPED | OUTPATIENT
Start: 2021-07-22 | End: 2021-09-01 | Stop reason: SDUPTHER

## 2021-07-22 NOTE — TELEPHONE ENCOUNTER
From: Aria Saavedra  To: Sharita Tapia DO  Sent: 7/21/2021 8:42 PM EDT  Subject: Non-Urgent Medical Question    Dr. Su Viera prescribe me venlafaxine er 37.5 mg. He wanted me to send a message about how its working. Have noticed it is helping with my moods. Is it possible to try the next dose? Something else, not related. I tried a new deodorant and i now have a irritation on my armpit. I attached a picture. Was wondering if I need to be seen or if there is something he can suggest for this. It is painful. I am diabetic and do not want to cause any problems.  Help!!!

## 2021-08-19 ENCOUNTER — OFFICE VISIT (OUTPATIENT)
Dept: INTERNAL MEDICINE CLINIC | Age: 46
End: 2021-08-19
Payer: COMMERCIAL

## 2021-08-19 ENCOUNTER — TELEPHONE (OUTPATIENT)
Dept: INTERNAL MEDICINE CLINIC | Age: 46
End: 2021-08-19

## 2021-08-19 VITALS
HEIGHT: 68 IN | TEMPERATURE: 97.2 F | SYSTOLIC BLOOD PRESSURE: 134 MMHG | DIASTOLIC BLOOD PRESSURE: 63 MMHG | WEIGHT: 226.2 LBS | BODY MASS INDEX: 34.28 KG/M2 | HEART RATE: 88 BPM

## 2021-08-19 DIAGNOSIS — E10.9 TYPE 1 DIABETES MELLITUS WITHOUT COMPLICATION (HCC): ICD-10-CM

## 2021-08-19 DIAGNOSIS — E10.69 TYPE 1 DIABETES MELLITUS WITH OTHER SPECIFIED COMPLICATION (HCC): ICD-10-CM

## 2021-08-19 PROCEDURE — G0108 DIAB MANAGE TRN  PER INDIV: HCPCS | Performed by: INTERNAL MEDICINE

## 2021-08-19 NOTE — PROGRESS NOTES
[facility]  [address] [address] [place], [address]  [phone] (phone)  [phone] (fax)    Patient ID: Coit Castleman 1975  Referring Provider: Dr. Nugent Neither     Patient's name and  were verified. Subjective:    She presents for Her follow-up diabetic visit. She has type 1 diabetes mellitus. Home regimen includes: Insulin She is noncompliant some of the time. Assessment:     Lab Results   Component Value Date    LABA1C 8.0 2021    BUN 5 2020    CREATININE 0.5 2020     There were no vitals filed for this visit. Wt Readings from Last 3 Encounters:   21 226 lb (102.5 kg)   21 224 lb (101.6 kg)   21 224 lb 6.4 oz (101.8 kg)     Ht Readings from Last 3 Encounters:   21 5' 7\" (1.702 m)   21 5' 7\" (1.702 m)   21 5' 7\" (1.702 m)       Current monitoring regimen: home blood tests - 4+ times daily; Dexcom CGM  Home blood sugar trends: see CGM download. Poor meal clearance with increased risk of lows 4-5   Any episodes of hypoglycemia? yes - 3-5 times per week; highest risk AC  Lunch and dinner  Depression screening completed 3/9/2021  Previous visit with dietician: yes - 6/3/21  Current diet: B Tala cottage cheese/ fruit cup or avacado toast or scr egg cup                       L packing -salad or Joeys sub                       D Beau's 1 / 1/4 cup m potato/                                     biscuit                       Has reduced snacking --cucumbers                             Less chips than before   Current exercise: Eliptical 3 days per week; 30  Mins. Walking at lunch most days. Eye exam current (within one year): yes 2021  Any history of foot problems? no  Last foot exam: 2021  Immunizations up to date: not sure for   Taking ASA:  No - If not why? Not indicated  Appropriate for use of MyChart Glucose Grid:  Yes    Focus:     Diabetes education. Recent A1C 8.0%.  Crystal states that she is doing much better than 3 months ago. She is working with her PCP for depression and reports much improvement. Noting low BG risk during the night and 4-5 hrpp bkfst and lunch. Meal clearance is poor. Discussed insulin pump settings to address these issues. Discussed evaluation of Dexcom readings- basal vs bolus settings. Aria is more engaged in her glucose control/ health. Much encouragment given. Follow up 3 months. DSME PLAN:   Discussed general issues about diabetes pathophysiology and management. Counseling at today's visit: BG goals; carbs; exercise; txing low BS; basal vs bolus. Start watching how well you are clearing meals          Your blood sugar should rise to about 200 and start coming down under             150-180 within 2 hours orf eating your meal             = this is determines by your carb ratio or food choices          Your blood sugar by the next meal (3-5 hours later) should be back to                              = this is your basal rate  Basal rates 12am 1.0 units, 5am 1.2 units, 7pm 1.5  Units, 10pm 1.3 units  Carb ratios 12am 7grams, 6:30am 4 grams, 5pm 3.5 grams, 10pm 6.5 grams  Think about using your eliptical every day --3 days or 30 minutes and other days                   Maybe 10-15 minutes  Good job with your meal planning! Treat a low blood sugar with only 15 grams of fast glucose            1/2 cup of juice or regular soda            3 rolls smarties, sweetarts, skittles,           *wait the 10-15 minutes before deciding if you need to treat again  Check with Omnipod--is your insurance going to cover better under               Your prescription plan or durable medical equipment          OmniPod [phone]    Meter download, medications, PMH and nursing assessment reviewed. Kourtney 65 states She is willing to participate in this plan of care and verbalized understanding of all instructions provided. Teach back used to verify comprehension.       Total time involved in direct patient education: 60 minutes.

## 2021-09-02 ENCOUNTER — OFFICE VISIT (OUTPATIENT)
Dept: FAMILY MEDICINE CLINIC | Age: 46
End: 2021-09-02
Payer: COMMERCIAL

## 2021-09-02 VITALS
RESPIRATION RATE: 20 BRPM | BODY MASS INDEX: 34.52 KG/M2 | SYSTOLIC BLOOD PRESSURE: 128 MMHG | WEIGHT: 227 LBS | HEART RATE: 84 BPM | DIASTOLIC BLOOD PRESSURE: 74 MMHG

## 2021-09-02 DIAGNOSIS — L30.9 DERMATITIS: Primary | ICD-10-CM

## 2021-09-02 PROCEDURE — 99213 OFFICE O/P EST LOW 20 MIN: CPT | Performed by: FAMILY MEDICINE

## 2021-09-02 PROCEDURE — 1036F TOBACCO NON-USER: CPT | Performed by: FAMILY MEDICINE

## 2021-09-02 PROCEDURE — G8427 DOCREV CUR MEDS BY ELIG CLIN: HCPCS | Performed by: FAMILY MEDICINE

## 2021-09-02 PROCEDURE — G8417 CALC BMI ABV UP PARAM F/U: HCPCS | Performed by: FAMILY MEDICINE

## 2021-09-02 RX ORDER — BLOOD-GLUCOSE SENSOR
EACH MISCELLANEOUS
COMMUNITY
End: 2021-10-01

## 2021-09-02 RX ORDER — CLOTRIMAZOLE AND BETAMETHASONE DIPROPIONATE 10; .64 MG/G; MG/G
CREAM TOPICAL
Qty: 30 G | Refills: 0 | Status: SHIPPED | OUTPATIENT
Start: 2021-09-02 | End: 2022-05-09

## 2021-09-03 ASSESSMENT — ENCOUNTER SYMPTOMS
RESPIRATORY NEGATIVE: 1
GASTROINTESTINAL NEGATIVE: 1

## 2021-09-03 NOTE — PROGRESS NOTES
Kourtney Younger (:  1975) is a 48 y.o. female,Established patient, here for evaluation of the following chief complaint(s):  Rash (under right arm)        Subjective   SUBJECTIVE/OBJECTIVE:  HPI:    Chief Complaint   Patient presents with    Rash     under right arm     Pt presents today for follow up on rash under each arm. Photo in media. Left arm responded well to the steroid cream.  Right axilla continues to get worse and now blistering and sore. Patient Active Problem List   Diagnosis    Diabetes mellitus type 1, uncontrolled ([address]    Hypothyroid    Retinopathy of left eye    Microalbuminuria    Chronic constipation    Controlled type 1 diabetes mellitus without complication, with long-term current use of insulin ([address]    Uncontrolled type 1 diabetes mellitus without complication     Past Surgical History:   Procedure Laterality Date     SECTION  ,     CHOLECYSTECTOMY  2010    ENDOMETRIAL ABLATION  2008    HYSTERECTOMY      PARTIAL HYSTERECTOMY  4/14/15    Dr. Jori Stephenson ovary present bilateral     SHOULDER SURGERY Left 2017    TUBAL LIGATION  2006     Social History     Tobacco Use    Smoking status: Never Smoker    Smokeless tobacco: Never Used   Vaping Use    Vaping Use: Never used   Substance Use Topics    Alcohol use: Not Currently     Comment: rarely    Drug use: No         Review of Systems   Constitutional: Negative. HENT: Negative. Respiratory: Negative. Cardiovascular: Negative. Gastrointestinal: Negative. Musculoskeletal: Negative. Skin: Positive for rash (right axilla). All other systems reviewed and are negative. Objective   Physical Exam  Vitals and nursing note reviewed. Constitutional:       General: She is not in acute distress. Appearance: Normal appearance. She is well-developed. HENT:      Head: Normocephalic and atraumatic.       Right Ear: Tympanic membrane normal.      Left Ear: Tympanic membrane normal.   Eyes:      Conjunctiva/sclera: Conjunctivae normal.   Cardiovascular:      Rate and Rhythm: Normal rate and regular rhythm. Heart sounds: Normal heart sounds. No murmur heard. Pulmonary:      Effort: Pulmonary effort is normal.      Breath sounds: Normal breath sounds. No wheezing, rhonchi or rales. Chest:       Abdominal:      General: There is no distension. Musculoskeletal:      Cervical back: Neck supple. Skin:     General: Skin is warm and dry. Neurological:      General: No focal deficit present. Mental Status: She is alert. Psychiatric:         Attention and Perception: Attention normal.         Mood and Affect: Mood normal.         Speech: Speech normal.         Behavior: Behavior normal. Behavior is cooperative. Thought Content: Thought content normal.         Judgment: Judgment normal.               ASSESSMENT/PLAN:  1. Dermatitis  -     clotrimazole-betamethasone (LOTRISONE) 1-0.05 % cream; Apply topically 2 times daily. , Disp-30 g, R-0, Normal      Return if symptoms worsen or fail to improve. An electronic signature was used to authenticate this note.     --Marizol Martinez, DO

## 2021-10-01 ENCOUNTER — VIRTUAL VISIT (OUTPATIENT)
Dept: FAMILY MEDICINE CLINIC | Age: 46
End: 2021-10-01
Payer: COMMERCIAL

## 2021-10-01 DIAGNOSIS — E10.65 UNCONTROLLED TYPE 1 DIABETES MELLITUS WITH HYPERGLYCEMIA (HCC): ICD-10-CM

## 2021-10-01 DIAGNOSIS — J06.9 ACUTE URI: Primary | ICD-10-CM

## 2021-10-01 PROCEDURE — 99213 OFFICE O/P EST LOW 20 MIN: CPT | Performed by: FAMILY MEDICINE

## 2021-10-01 PROCEDURE — G8428 CUR MEDS NOT DOCUMENT: HCPCS | Performed by: FAMILY MEDICINE

## 2021-10-01 PROCEDURE — 2022F DILAT RTA XM EVC RTNOPTHY: CPT | Performed by: FAMILY MEDICINE

## 2021-10-01 PROCEDURE — 3052F HG A1C>EQUAL 8.0%<EQUAL 9.0%: CPT | Performed by: FAMILY MEDICINE

## 2021-10-01 RX ORDER — HYDROCODONE POLISTIREX AND CHLORPHENIRAMINE POLISTIREX 10; 8 MG/5ML; MG/5ML
5 SUSPENSION, EXTENDED RELEASE ORAL EVERY 12 HOURS PRN
Qty: 120 ML | Refills: 0 | Status: SHIPPED | OUTPATIENT
Start: 2021-10-01 | End: 2021-10-13

## 2021-10-01 RX ORDER — BLOOD-GLUCOSE SENSOR
EACH MISCELLANEOUS
Qty: 9 EACH | Refills: 3 | Status: SHIPPED | OUTPATIENT
Start: 2021-10-01 | End: 2022-09-13

## 2021-10-01 RX ORDER — BLOOD-GLUCOSE TRANSMITTER
EACH MISCELLANEOUS
Qty: 2 EACH | Refills: 3 | Status: SHIPPED | OUTPATIENT
Start: 2021-10-01 | End: 2022-11-04

## 2021-10-01 RX ORDER — BLOOD-GLUCOSE,RECEIVER,CONT
EACH MISCELLANEOUS
Qty: 1 EACH | Refills: 0 | Status: SHIPPED | OUTPATIENT
Start: 2021-10-01

## 2021-10-01 ASSESSMENT — ENCOUNTER SYMPTOMS
SINUS PAIN: 1
RESPIRATORY NEGATIVE: 1
SINUS PRESSURE: 1
GASTROINTESTINAL NEGATIVE: 1
RHINORRHEA: 1
SORE THROAT: 1

## 2021-10-01 NOTE — PROGRESS NOTES
Kourtney Younger (:  1975) is a 48 y.o. female,Established patient, here for evaluation of the following chief complaint(s): URI              SUBJECTIVE/OBJECTIVE:  HPI:    Chief Complaint   Patient presents with    URI     Pt presents today with c/o URI symptoms for the last 4 days. Vaccinated. Tested at work for Marvin which came back negative. A lot of head pressure. Tmax 100.7. Denies NV. Denies chest tightness, SOB. Patient Active Problem List   Diagnosis    Diabetes mellitus type 1, uncontrolled ([address]    Hypothyroid    Retinopathy of left eye    Microalbuminuria    Chronic constipation    Controlled type 1 diabetes mellitus without complication, with long-term current use of insulin ([address]    Uncontrolled type 1 diabetes mellitus without complication     Past Surgical History:   Procedure Laterality Date     SECTION  ,     CHOLECYSTECTOMY  2010    ENDOMETRIAL ABLATION  2008    HYSTERECTOMY      PARTIAL HYSTERECTOMY  4/14/15    Dr. Rula Waite ovary present bilateral     SHOULDER SURGERY Left 2017    TUBAL LIGATION       Social History     Tobacco Use    Smoking status: Never Smoker    Smokeless tobacco: Never Used   Vaping Use    Vaping Use: Never used   Substance Use Topics    Alcohol use: Not Currently     Comment: rarely    Drug use: No         Review of Systems   Constitutional: Positive for fatigue and fever. Negative for chills. HENT: Positive for congestion, rhinorrhea, sinus pressure, sinus pain and sore throat. Respiratory: Negative. Cardiovascular: Negative. Gastrointestinal: Negative. Musculoskeletal: Negative. All other systems reviewed and are negative. No flowsheet data found. Physical Exam  Constitutional:       General: She is not in acute distress. Appearance: Normal appearance. She is well-developed. She is not ill-appearing. HENT:      Head: Normocephalic and atraumatic.       Right Ear: External ear normal.      Left Ear: External ear normal.   Eyes:      Conjunctiva/sclera: Conjunctivae normal.   Pulmonary:      Effort: Pulmonary effort is normal. No respiratory distress. Skin:     Findings: No rash (on exposed surfaces). Neurological:      Mental Status: She is alert and oriented to person, place, and time. Psychiatric:         Mood and Affect: Mood normal.         Behavior: Behavior normal.         Thought Content: Thought content normal.         Judgment: Judgment normal.     ASSESSMENT/PLAN:  1. Acute URI  -     HYDROcodone-chlorpheniramine (TUSSIONEX PENNKINETIC ER) 10-8 MG/5ML SUER; Take 5 mLs by mouth every 12 hours as needed (cough) for up to 12 days. , Disp-120 mL, R-0Normal  2. Uncontrolled type 1 diabetes mellitus with hyperglycemia ([facility]  -     Continuous Blood Gluc  ([address]) [address]; As directed. Dx: IDDM, Disp-1 each, R-0Normal  -     Continuous Blood Gluc Sensor (DEXCOM G6 SENSOR) MISC; As directed. Dx: IDDM, Disp-9 each, R-3Normal  -     Continuous Blood Gluc Transmit (DEXCOM G6 TRANSMITTER) MISC; As directed. Dx: IDDM, Disp-2 each, R-3Normal    -  Viral nature discussed  -  Rest, fluids  -  Symptomatic care only at this time, rx Tussionex sent  -  Handwashing!!!      Return if symptoms worsen or fail to improve. Kourtney 65, was evaluated through a synchronous (real-time) audio-video encounter. The patient (or guardian if applicable) is aware that this is a billable service. Verbal consent to proceed has been obtained within the past 12 months. The visit was conducted pursuant to the emergency declaration under the [facility], [address] [place] [address] authority and the [employer] General Act. Patient identification was verified, and a caregiver was present when appropriate. The patient was located in a state where the provider was credentialed to provide care.       An

## 2021-10-03 ENCOUNTER — PATIENT MESSAGE (OUTPATIENT)
Dept: FAMILY MEDICINE CLINIC | Age: 46
End: 2021-10-03

## 2021-10-04 RX ORDER — DOXYCYCLINE HYCLATE 100 MG
100 TABLET ORAL 2 TIMES DAILY
Qty: 14 TABLET | Refills: 0 | Status: SHIPPED | OUTPATIENT
Start: 2021-10-04 | End: 2021-10-11

## 2021-10-04 RX ORDER — DEXTROMETHORPHAN HYDROBROMIDE AND PROMETHAZINE HYDROCHLORIDE 15; 6.25 MG/5ML; MG/5ML
5 SYRUP ORAL 4 TIMES DAILY PRN
Qty: 120 ML | Refills: 0 | Status: SHIPPED | OUTPATIENT
Start: 2021-10-04 | End: 2022-01-27

## 2021-10-04 NOTE — TELEPHONE ENCOUNTER
From: Aria Saavedra  To: Oneal DO Veda  Sent: 10/3/2021 7:54 PM EDT  Subject: Prescription Question    Hello, I had an E-visit with Dr. Clara Miller on 10-1-21. He gave me a prescription for some cough syrup. It gives me nausea when I take it. It is hydrocodone/chlorphen ER suspension. He had mentioned there was another cough syrup with phenergan. Was wondering if I could try that instead. Also would like an antibiotic called in. My cold is not any better and i have missed several days of work. Please help me.

## 2021-10-20 ENCOUNTER — NURSE ONLY (OUTPATIENT)
Dept: LAB | Age: 46
End: 2021-10-20

## 2021-10-20 DIAGNOSIS — E10.65 UNCONTROLLED TYPE 1 DIABETES MELLITUS WITH HYPERGLYCEMIA (HCC): ICD-10-CM

## 2021-10-20 LAB
ANION GAP SERPL CALCULATED.3IONS-SCNC: 11 MEQ/L (ref 8–16)
AVERAGE GLUCOSE: 198 MG/DL (ref 70–126)
BUN BLDV-MCNC: 11 MG/DL (ref 7–22)
CALCIUM SERPL-MCNC: 9.4 MG/DL (ref 8.5–10.5)
CHLORIDE BLD-SCNC: 104 MEQ/L (ref 98–111)
CO2: 26 MEQ/L (ref 23–33)
CREAT SERPL-MCNC: 0.7 MG/DL (ref 0.4–1.2)
GFR SERPL CREATININE-BSD FRML MDRD: 89 ML/MIN/1.73M2
GLUCOSE BLD-MCNC: 349 MG/DL (ref 70–108)
HBA1C MFR BLD: 8.6 % (ref 4.4–6.4)
POTASSIUM SERPL-SCNC: 5.5 MEQ/L (ref 3.5–5.2)
SODIUM BLD-SCNC: 141 MEQ/L (ref 135–145)

## 2021-10-21 ENCOUNTER — OFFICE VISIT (OUTPATIENT)
Dept: FAMILY MEDICINE CLINIC | Age: 46
End: 2021-10-21
Payer: COMMERCIAL

## 2021-10-21 VITALS
RESPIRATION RATE: 16 BRPM | HEART RATE: 72 BPM | DIASTOLIC BLOOD PRESSURE: 74 MMHG | WEIGHT: 229.5 LBS | SYSTOLIC BLOOD PRESSURE: 130 MMHG | BODY MASS INDEX: 34.9 KG/M2

## 2021-10-21 DIAGNOSIS — E66.9 OBESITY (BMI 30-39.9): ICD-10-CM

## 2021-10-21 DIAGNOSIS — E10.65 UNCONTROLLED TYPE 1 DIABETES MELLITUS WITH HYPERGLYCEMIA (HCC): Primary | ICD-10-CM

## 2021-10-21 DIAGNOSIS — R80.9 MICROALBUMINURIA: ICD-10-CM

## 2021-10-21 DIAGNOSIS — E78.00 PURE HYPERCHOLESTEROLEMIA: ICD-10-CM

## 2021-10-21 DIAGNOSIS — E03.9 HYPOTHYROIDISM, UNSPECIFIED TYPE: ICD-10-CM

## 2021-10-21 DIAGNOSIS — N95.1 PERIMENOPAUSAL: ICD-10-CM

## 2021-10-21 PROCEDURE — 1036F TOBACCO NON-USER: CPT | Performed by: FAMILY MEDICINE

## 2021-10-21 PROCEDURE — G8417 CALC BMI ABV UP PARAM F/U: HCPCS | Performed by: FAMILY MEDICINE

## 2021-10-21 PROCEDURE — 2022F DILAT RTA XM EVC RTNOPTHY: CPT | Performed by: FAMILY MEDICINE

## 2021-10-21 PROCEDURE — G8484 FLU IMMUNIZE NO ADMIN: HCPCS | Performed by: FAMILY MEDICINE

## 2021-10-21 PROCEDURE — G8427 DOCREV CUR MEDS BY ELIG CLIN: HCPCS | Performed by: FAMILY MEDICINE

## 2021-10-21 PROCEDURE — 99214 OFFICE O/P EST MOD 30 MIN: CPT | Performed by: FAMILY MEDICINE

## 2021-10-21 PROCEDURE — 3052F HG A1C>EQUAL 8.0%<EQUAL 9.0%: CPT | Performed by: FAMILY MEDICINE

## 2021-10-21 ASSESSMENT — ENCOUNTER SYMPTOMS
GASTROINTESTINAL NEGATIVE: 1
RESPIRATORY NEGATIVE: 1

## 2021-10-21 NOTE — PROGRESS NOTES
Kourtney Younger (:  1975) is a 48 y.o. female,Established patient, here for evaluation of the following chief complaint(s):  3 Month Follow-Up, Diabetes, and Results        Subjective   SUBJECTIVE/OBJECTIVE:  HPI:    Chief Complaint   Patient presents with    3 Month Follow-Up    Diabetes    Results     3 month eval.  Doing well. Sugars still too high. Recently fought a nasty URI which caused her sugars to jump. Lab Results   Component Value Date    LABA1C 8.6 (H) 10/20/2021    LABA1C 8.0 (H) 2021    LABA1C 8.6 (H) 09/10/2020     Lab Results   Component Value Date    LDLCALC 92 2021    CREATININE 0.7 10/20/2021     BPs and weight stable. BP Readings from Last 3 Encounters:   10/21/21 130/74   21 128/74   21 134/63     Wt Readings from Last 3 Encounters:   10/21/21 229 lb 8 oz (104.1 kg)   21 227 lb (103 kg)   21 226 lb 3.2 oz (102.6 kg)     Patient Active Problem List   Diagnosis    Diabetes mellitus type 1, uncontrolled ([address]    Hypothyroid    Retinopathy of left eye    Microalbuminuria    Chronic constipation    Controlled type 1 diabetes mellitus without complication, with long-term current use of insulin ([address]    Uncontrolled type 1 diabetes mellitus without complication     Past Surgical History:   Procedure Laterality Date     SECTION  , 2006    CHOLECYSTECTOMY  2010    ENDOMETRIAL ABLATION  2008    HYSTERECTOMY      PARTIAL HYSTERECTOMY  4/14/15    Dr. Sahara Castillo ovary present bilateral     SHOULDER SURGERY Left 2017    TUBAL LIGATION  2006     Social History     Tobacco Use    Smoking status: Never Smoker    Smokeless tobacco: Never Used   Vaping Use    Vaping Use: Never used   Substance Use Topics    Alcohol use: Not Currently     Comment: rarely    Drug use: No         Review of Systems   Constitutional: Negative. HENT: Negative. Respiratory: Negative. Cardiovascular: Negative. Gastrointestinal: Negative. Musculoskeletal: Negative. All other systems reviewed and are negative. Objective   Physical Exam  Vitals and nursing note reviewed. Constitutional:       General: She is not in acute distress. Appearance: Normal appearance. She is well-developed. HENT:      Head: Normocephalic and atraumatic. Right Ear: Tympanic membrane normal.      Left Ear: Tympanic membrane normal.   Eyes:      Conjunctiva/sclera: Conjunctivae normal.   Cardiovascular:      Rate and Rhythm: Normal rate and regular rhythm. Heart sounds: Normal heart sounds. No murmur heard. Pulmonary:      Effort: Pulmonary effort is normal.      Breath sounds: Normal breath sounds. No wheezing, rhonchi or rales. Abdominal:      General: There is no distension. Musculoskeletal:      Cervical back: Neck supple. Skin:     General: Skin is warm and dry. Findings: No rash (on exposed surfaces). Neurological:      General: No focal deficit present. Mental Status: She is alert. Psychiatric:         Attention and Perception: Attention normal.         Mood and Affect: Mood normal.         Speech: Speech normal.         Behavior: Behavior normal. Behavior is cooperative. Thought Content: Thought content normal.         Judgment: Judgment normal.               ASSESSMENT/PLAN:  1. Uncontrolled type 1 diabetes mellitus with hyperglycemia (HCC)  -     Hemoglobin A1C; Future  2. Microalbuminuria  3. Pure hypercholesterolemia  4. Obesity (BMI 30-39.9)  5. Hypothyroidism, unspecified type  6. Perimenopausal    -  Chronic medical problems stable  -  Continue current medications  -  Follow up with specialists as scheduled      Return in about 3 months (around 1/21/2022) for DM2. An electronic signature was used to authenticate this note.     --Asif Marroquin DO

## 2022-01-20 NOTE — TELEPHONE ENCOUNTER
Last visit- 10/21/2021  Next visit- 1/27/2022    Requested Prescriptions     Pending Prescriptions Disp Refills    HUMALOG 100 UNIT/ML injection vial 30 mL 5     Sig: USE AS DIRECTED PER PUMP SETTING, MAX   UNITS DAILY        Last fill date- 2/24/21  Pharmacy-   [id number] - [place], [place] - [phone] [address], [address] [place] [id number]   Phone:  [phone]  Fax:  [phone]

## 2022-01-26 ENCOUNTER — NURSE ONLY (OUTPATIENT)
Dept: LAB | Age: 47
End: 2022-01-26

## 2022-01-26 DIAGNOSIS — E10.65 UNCONTROLLED TYPE 1 DIABETES MELLITUS WITH HYPERGLYCEMIA (HCC): ICD-10-CM

## 2022-01-26 LAB
AVERAGE GLUCOSE: 180 MG/DL (ref 70–126)
HBA1C MFR BLD: 8 % (ref 4.4–6.4)

## 2022-01-27 ENCOUNTER — OFFICE VISIT (OUTPATIENT)
Dept: FAMILY MEDICINE CLINIC | Age: 47
End: 2022-01-27
Payer: COMMERCIAL

## 2022-01-27 VITALS
SYSTOLIC BLOOD PRESSURE: 124 MMHG | WEIGHT: 234.8 LBS | HEART RATE: 76 BPM | DIASTOLIC BLOOD PRESSURE: 70 MMHG | RESPIRATION RATE: 16 BRPM | BODY MASS INDEX: 35.7 KG/M2

## 2022-01-27 DIAGNOSIS — Z51.81 MEDICATION MONITORING ENCOUNTER: ICD-10-CM

## 2022-01-27 DIAGNOSIS — E10.65 UNCONTROLLED TYPE 1 DIABETES MELLITUS WITH HYPERGLYCEMIA (HCC): Primary | ICD-10-CM

## 2022-01-27 DIAGNOSIS — E03.9 HYPOTHYROIDISM, UNSPECIFIED TYPE: ICD-10-CM

## 2022-01-27 DIAGNOSIS — R80.9 MICROALBUMINURIA: ICD-10-CM

## 2022-01-27 DIAGNOSIS — E66.9 OBESITY (BMI 30-39.9): ICD-10-CM

## 2022-01-27 DIAGNOSIS — E78.00 PURE HYPERCHOLESTEROLEMIA: ICD-10-CM

## 2022-01-27 DIAGNOSIS — N95.1 PERIMENOPAUSAL: ICD-10-CM

## 2022-01-27 PROCEDURE — G8484 FLU IMMUNIZE NO ADMIN: HCPCS | Performed by: FAMILY MEDICINE

## 2022-01-27 PROCEDURE — 99214 OFFICE O/P EST MOD 30 MIN: CPT | Performed by: FAMILY MEDICINE

## 2022-01-27 PROCEDURE — 3052F HG A1C>EQUAL 8.0%<EQUAL 9.0%: CPT | Performed by: FAMILY MEDICINE

## 2022-01-27 PROCEDURE — 1036F TOBACCO NON-USER: CPT | Performed by: FAMILY MEDICINE

## 2022-01-27 PROCEDURE — 2022F DILAT RTA XM EVC RTNOPTHY: CPT | Performed by: FAMILY MEDICINE

## 2022-01-27 PROCEDURE — G8417 CALC BMI ABV UP PARAM F/U: HCPCS | Performed by: FAMILY MEDICINE

## 2022-01-27 PROCEDURE — G8427 DOCREV CUR MEDS BY ELIG CLIN: HCPCS | Performed by: FAMILY MEDICINE

## 2022-01-27 RX ORDER — ESCITALOPRAM OXALATE 10 MG/1
10 TABLET ORAL DAILY
Qty: 30 TABLET | Refills: 1 | Status: SHIPPED | OUTPATIENT
Start: 2022-01-27 | End: 2022-05-09

## 2022-01-27 ASSESSMENT — ENCOUNTER SYMPTOMS
GASTROINTESTINAL NEGATIVE: 1
RESPIRATORY NEGATIVE: 1

## 2022-01-27 NOTE — PROGRESS NOTES
Kourtney Younger (:  1975) is a 48 y.o. female,Established patient, here for evaluation of the following chief complaint(s):  3 Month Follow-Up, Diabetes, and Results        Subjective   SUBJECTIVE/OBJECTIVE:  HPI:    Chief Complaint   Patient presents with    3 Month Follow-Up    Diabetes    Results     3 month eval.    Sugars mildly improved. Lab Results   Component Value Date    LABA1C 8.0 (H) 2022    LABA1C 8.6 (H) 10/20/2021    LABA1C 8.0 (H) 2021     Lab Results   Component Value Date    LDLCALC 92 2021    CREATININE 0.7 10/20/2021     BPs fine, weight is up a little. BP Readings from Last 3 Encounters:   22 124/70   10/21/21 130/74   21 128/74     Wt Readings from Last 3 Encounters:   22 234 lb 12.8 oz (106.5 kg)   10/21/21 229 lb 8 oz (104.1 kg)   21 227 lb (103 kg)     No longer taking Effexor, did not like the way it made her feel. Would like to try something different for anxiety and hot flashes. Patient Active Problem List   Diagnosis    Diabetes mellitus type 1, uncontrolled ([address]    Hypothyroid    Retinopathy of left eye    Microalbuminuria    Chronic constipation    Controlled type 1 diabetes mellitus without complication, with long-term current use of insulin ([address]    Uncontrolled type 1 diabetes mellitus without complication     Past Surgical History:   Procedure Laterality Date     SECTION  , 2006    CHOLECYSTECTOMY  2010    ENDOMETRIAL ABLATION  2008    HYSTERECTOMY      PARTIAL HYSTERECTOMY  4/14/15    Dr. Janet Mansfield ovary present bilateral     SHOULDER SURGERY Left 2017    TUBAL LIGATION  2006     Social History     Tobacco Use    Smoking status: Never Smoker    Smokeless tobacco: Never Used   Vaping Use    Vaping Use: Never used   Substance Use Topics    Alcohol use: Not Currently     Comment: rarely    Drug use: No         Review of Systems   Constitutional: Negative. HENT: Negative. Respiratory: Negative. Cardiovascular: Negative. Gastrointestinal: Negative. Musculoskeletal: Negative. All other systems reviewed and are negative. Objective   Physical Exam  Vitals and nursing note reviewed. Constitutional:       General: She is not in acute distress. Appearance: Normal appearance. She is well-developed. HENT:      Head: Normocephalic and atraumatic. Right Ear: Tympanic membrane normal.      Left Ear: Tympanic membrane normal.   Eyes:      Conjunctiva/sclera: Conjunctivae normal.   Cardiovascular:      Rate and Rhythm: Normal rate and regular rhythm. Heart sounds: Normal heart sounds. No murmur heard. Pulmonary:      Effort: Pulmonary effort is normal.      Breath sounds: Normal breath sounds. No wheezing, rhonchi or rales. Abdominal:      General: There is no distension. Musculoskeletal:      Cervical back: Neck supple. Skin:     General: Skin is warm and dry. Findings: No rash (on exposed surfaces). Neurological:      General: No focal deficit present. Mental Status: She is alert. Psychiatric:         Attention and Perception: Attention normal.         Mood and Affect: Mood normal.         Speech: Speech normal.         Behavior: Behavior normal. Behavior is cooperative. Thought Content: Thought content normal.         Judgment: Judgment normal.               ASSESSMENT/PLAN:  1. Uncontrolled type 1 diabetes mellitus with hyperglycemia (HCC)  -     Comprehensive Metabolic Panel; Future  -     Hemoglobin A1C; Future  -     Microalbumin / Creatinine Urine Ratio; Future  2. Microalbuminuria  3. Pure hypercholesterolemia  -     Lipid Panel w/ Reflex Direct LDL; Future  -     Comprehensive Metabolic Panel; Future  -     TSH WITH REFLEX TO FT4; Future  4. Obesity (BMI 30-39.9)  5. Hypothyroidism, unspecified type  -     TSH WITH REFLEX TO FT4; Future  6. Perimenopausal  -     escitalopram (LEXAPRO) 10 MG tablet;  Take 1 tablet by mouth daily, Disp-30 tablet, R-1Normal  7. Medication monitoring encounter  -     CBC Auto Differential; Future    -  Chronic medical problems stable  -  Labs reviewed, A1C stable  -  Continue current medications  -  Follow up with specialists as scheduled  -  Recheck labs 6 mos    Return in about 6 months (around 7/27/2022) for DM2. An electronic signature was used to authenticate this note.     --Nadeen Neigh, DO

## 2022-04-28 ENCOUNTER — OFFICE VISIT (OUTPATIENT)
Dept: INTERNAL MEDICINE CLINIC | Age: 47
End: 2022-04-28
Payer: COMMERCIAL

## 2022-04-28 ENCOUNTER — TELEPHONE (OUTPATIENT)
Dept: INTERNAL MEDICINE CLINIC | Age: 47
End: 2022-04-28

## 2022-04-28 VITALS
SYSTOLIC BLOOD PRESSURE: 130 MMHG | WEIGHT: 231.6 LBS | HEIGHT: 67 IN | BODY MASS INDEX: 36.35 KG/M2 | HEART RATE: 77 BPM | DIASTOLIC BLOOD PRESSURE: 80 MMHG | TEMPERATURE: 97.3 F

## 2022-04-28 DIAGNOSIS — E10.65 UNCONTROLLED TYPE 1 DIABETES MELLITUS WITH HYPERGLYCEMIA (HCC): Primary | ICD-10-CM

## 2022-04-28 LAB — HBA1C MFR BLD: 8.3 % (ref 4.3–5.7)

## 2022-04-28 PROCEDURE — G0108 DIAB MANAGE TRN  PER INDIV: HCPCS | Performed by: INTERNAL MEDICINE

## 2022-04-28 PROCEDURE — 83036 HEMOGLOBIN GLYCOSYLATED A1C: CPT | Performed by: INTERNAL MEDICINE

## 2022-04-28 RX ORDER — INSULIN PUMP CONTROLLER
1 EACH MISCELLANEOUS
Qty: 10 EACH | Refills: 5 | Status: SHIPPED | OUTPATIENT
Start: 2022-04-28

## 2022-04-28 RX ORDER — INSULIN PUMP CONTROLLER
1 EACH MISCELLANEOUS ONCE
Qty: 1 KIT | Refills: 0 | Status: SHIPPED | OUTPATIENT
Start: 2022-04-28 | End: 2022-05-13

## 2022-04-28 ASSESSMENT — PATIENT HEALTH QUESTIONNAIRE - PHQ9
SUM OF ALL RESPONSES TO PHQ QUESTIONS 1-9: 0
SUM OF ALL RESPONSES TO PHQ QUESTIONS 1-9: 0
2. FEELING DOWN, DEPRESSED OR HOPELESS: 0
SUM OF ALL RESPONSES TO PHQ QUESTIONS 1-9: 0
1. LITTLE INTEREST OR PLEASURE IN DOING THINGS: 0
SUM OF ALL RESPONSES TO PHQ9 QUESTIONS 1 & 2: 0
SUM OF ALL RESPONSES TO PHQ QUESTIONS 1-9: 0

## 2022-04-28 NOTE — TELEPHONE ENCOUNTER
Called Aria to let her know that her script for her [address] was sent to her pharmacy and to let us know when she receives it so we can set up a time to start the new pump. Patient verbalized understanding.

## 2022-04-28 NOTE — PROGRESS NOTES
[facility]  [address] [facility], [address]  [phone] (phone)  [phone] (fax)    Patient ID: Alison Salguero 1975  Referring Provider: Dr. Su Viera     Patient's name and  were verified. Subjective:    She presents for Her follow-up diabetic visit. She has type 1 diabetes mellitus. Home regimen includes: Insulin She is compliant most of the time. Assessment:     Lab Results   Component Value Date    LABA1C 8.0 2022    BUN 11 10/20/2021    CREATININE 0.7 10/20/2021     There were no vitals filed for this visit. Wt Readings from Last 3 Encounters:   22 234 lb 12.8 oz (106.5 kg)   10/21/21 229 lb 8 oz (104.1 kg)   21 227 lb (103 kg)     Ht Readings from Last 3 Encounters:   21 5' 8\" (1.727 m)   21 5' 7\" (1.702 m)   21 5' 7\" (1.702 m)       Glucose at FBS today resulted at 271mg/dl  Current monitoring regimen: home blood tests - 4+ times daily; Dexcom CGM  Home blood sugar trends: FBS's 182-290. Noon [id number]. Dinner 199-310. -315  Any episodes of hypoglycemia? yes - 1 per week; higher risk before supper  Depression screening completed 22  SCore 0  Previous visit with dietician: yes - 6/3/21  Current diet: B granola and yogurt                       L salad with chicken/ cheese snack/ walnuts                        D   Current exercise: Elipitical 30-45 mins 3 days per week; walking several days per week  Eye exam current (within one year): no 2021 Appt 2022                      Dr. Meeta Thibodeaux  Any history of foot problems? no  Last foot exam: 22 Pedal pulses:   peripheral pulses symmetrical   Results of monofilament test: 10/10              Skin noted to be warm, pale and hair is absent. Calluses on soles of left foot-middle of the ball of foot and under the base of the 5th toe. They are sanded thin/ no issues. Nail fungus big toes bilat. 1+ edema noted bilat  Immunizations up to date: no. Flu 2019.  COVID initial vacc only.  Taking ASA:  No - If not why? Not indicated  Appropriate for use of MyChart Glucose Grid:  Yes    Focus:     Diabetes education. A1C today 8.3%. Overall, glucose levels are elevated. Aria is struggling with weight gain, feeling 'bloated', and higher blood sugars. She feels this is related to menopause. She also is being treated for depression, reporting the first medication made her feel funny and the new medication she is taking does not seem to help much. Encouraged follow up with PCP and GYN. Discussed insulin pump setting adjustments to help get glucose levels in better control. She would like to change to the OmniPod insulin pump therapy and her insurance will cover it under her prescription plan. Will request OmniPod  Precriptions. Much encouragement given. Follow up 2 months. DSME PLAN:   Discussed general issues about diabetes pathophysiology and management. Counseling at today's visit: BG goals; basal vs bolus; carbs; exercise; treating low BS; OmniPod therapy. Basal rates 12am 1.0 units, 3am 1.20, 7pm 1.5 units, 10pm 1.3 units  Carb ratio  12am 7 grams, 6:30am 3.5 grams, 5pm 3.5grams, 10pm 6.5grams  Sensitivity  12am 36, 1pm 36  We will ask Dr. Odell Hansen about the OmniPod system   Discuss fluid retention, emotions, weight gain with Dr. Odell Hansen  Discuss menopause s/s with your GYN  Meter download, medications, PMH and nursing assessment reviewed. Kourtney 65 states She is willing to participate in this plan of care and verbalized understanding of all instructions provided. Teach back used to verify comprehension. Total time involved in direct patient education: 60 minutes.

## 2022-04-28 NOTE — TELEPHONE ENCOUNTER
Diabetes education  FBS's 182-290. Noon [phone]. Dinner 199-310. -315  Glucose levels rise early in the morning and corrections are not bringing blood sugars near goal.    Dr. Odell Hansen,     Please authorize the [facility] at [address] [place] [address] to teach/ assist Aria to move her 5am setting to 2am (increase) and to increase Carb ratio at 6:30am from 4 grams to 3.5 grams and Sensitivity 12am and 1pm from 40 mg/dL to 36mg/dL. If you agree, please note and return. Thank you. Tere Smith would like to change from Medtronic insulin pump therapy to OmniPod insulin pump therapy. She has checked with her insurance company and it is covered under her prescription plan. If you agree to have Crystal change to OmniPod pump therapy, please review and sign the attached scripts.

## 2022-04-28 NOTE — PATIENT INSTRUCTIONS
Basal rates 12am 1.0 units, 3am 1.20, 7pm 1.5 units, 10pm 1.3 units  Carb ratio  12am 7 grams, 6:30am 3.5 grams, 5pm 3.5grams, 10pm 6.5grams  Sensitivity  12am 36, 1pm 36  We will ask Dr. Davi Chahal about the OmniPod system   Discuss fluid retention, emotions, weight gain with Dr. Davi Chahal  Discuss menopause s/s with your GYN

## 2022-04-28 NOTE — TELEPHONE ENCOUNTER
Anderson Abrams, DO  You 2 hours ago (12:35 PM)         Agree with plan. Routing comment      Aria instructed on the insulin pump settings as discussed. move her 5am setting to 2am (increase) and to increase Carb ratio at 6:30am from 4 grams to 3.5 grams and Sensitivity 12am and 1pm from 40 mg/dL to 36mg/dL. Aria voiced understanding via teach back. Please call Aria and let her know scripts were sent to pharmacy and to call the clinic when she has her equipment and would like to schedule a pump training.

## 2022-05-04 ENCOUNTER — TELEPHONE (OUTPATIENT)
Dept: INTERNAL MEDICINE CLINIC | Age: 47
End: 2022-05-04

## 2022-05-04 NOTE — TELEPHONE ENCOUNTER
Per Arnaldo Tapia RN, CDE's request, downloaded patient's DexCom. It is attached to this encounter. Please review.

## 2022-05-05 ENCOUNTER — TELEPHONE (OUTPATIENT)
Dept: INTERNAL MEDICINE CLINIC | Age: 47
End: 2022-05-05

## 2022-05-05 NOTE — TELEPHONE ENCOUNTER
Dexcom downloaded; scanned to Epic. Anne-Marie Vásquez is having a lot of elevated blood sugars. She was not able to download her Medtronic pump, making it more difficult to evaluate glucose issues at this time. Dr. Ulysses Avena,       Please authorize the [facility] at [address] [address] to teach/ assist Aria to make the following changes in her insulin pump settings:  12am 1.0 unit/ hr  5am 1.2 units/ hr  --increase 1.25 units/ hr  7pm 1.5 units/ hr  10pm 1.3 units/ hr  --omit and cont 1.5 units/hr until 12am     If you agree, please note and return. Thank you.

## 2022-05-06 NOTE — TELEPHONE ENCOUNTER
Call to [address] [place] [address] to make insulin pump setting changes as discussed. 12am 1.0 unit/ hr  5am 1.2 units/ hr  --increase 1.25 units/ hr  7pm 1.5 units/ hr  10pm 1.3 units/ hr  --omit and cont 1.5 units/hr until 12am  Changes made while on the phone. Aria voiced understanding of these changes via teach back. She agrees to download Medtronic insulin pump in 1 week for review with Dexcom CGM.

## 2022-05-09 ENCOUNTER — HOSPITAL ENCOUNTER (EMERGENCY)
Age: 47
Discharge: HOME | End: 2022-05-09
Payer: COMMERCIAL

## 2022-05-09 ENCOUNTER — APPOINTMENT (OUTPATIENT)
Dept: GENERAL RADIOLOGY | Age: 47
End: 2022-05-09
Payer: COMMERCIAL

## 2022-05-09 VITALS
DIASTOLIC BLOOD PRESSURE: 62 MMHG | TEMPERATURE: 98.2 F | HEART RATE: 82 BPM | OXYGEN SATURATION: 96 % | BODY MASS INDEX: 35.31 KG/M2 | WEIGHT: 225 LBS | RESPIRATION RATE: 16 BRPM | HEIGHT: 67 IN | SYSTOLIC BLOOD PRESSURE: 132 MMHG

## 2022-05-09 DIAGNOSIS — S80.02XA CONTUSION OF LEFT KNEE, INITIAL ENCOUNTER: ICD-10-CM

## 2022-05-09 DIAGNOSIS — S80.212A ABRASION OF LEFT KNEE, INITIAL ENCOUNTER: Primary | ICD-10-CM

## 2022-05-09 PROCEDURE — 99202 OFFICE O/P NEW SF 15 MIN: CPT | Performed by: NURSE PRACTITIONER

## 2022-05-09 PROCEDURE — G0463 HOSPITAL OUTPT CLINIC VISIT: HCPCS

## 2022-05-09 PROCEDURE — 99213 OFFICE O/P EST LOW 20 MIN: CPT

## 2022-05-09 PROCEDURE — 73564 X-RAY EXAM KNEE 4 OR MORE: CPT

## 2022-05-09 RX ORDER — MUPIROCIN CALCIUM 20 MG/G
CREAM TOPICAL
Qty: 15 G | Refills: 0 | Status: SHIPPED | OUTPATIENT
Start: 2022-05-09 | End: 2022-05-09 | Stop reason: CLARIF

## 2022-05-09 ASSESSMENT — PAIN DESCRIPTION - PAIN TYPE: TYPE: ACUTE PAIN

## 2022-05-09 ASSESSMENT — PAIN - FUNCTIONAL ASSESSMENT
PAIN_FUNCTIONAL_ASSESSMENT: PREVENTS OR INTERFERES SOME ACTIVE ACTIVITIES AND ADLS
PAIN_FUNCTIONAL_ASSESSMENT: 0-10

## 2022-05-09 ASSESSMENT — PAIN DESCRIPTION - DESCRIPTORS: DESCRIPTORS: ACHING;SORE

## 2022-05-09 ASSESSMENT — PAIN DESCRIPTION - LOCATION: LOCATION: KNEE

## 2022-05-09 ASSESSMENT — PAIN SCALES - GENERAL: PAINLEVEL_OUTOF10: 6

## 2022-05-09 ASSESSMENT — PAIN DESCRIPTION - FREQUENCY: FREQUENCY: CONTINUOUS

## 2022-05-09 ASSESSMENT — PAIN DESCRIPTION - ONSET: ONSET: GRADUAL

## 2022-05-09 ASSESSMENT — PAIN DESCRIPTION - ORIENTATION: ORIENTATION: LEFT

## 2022-05-09 NOTE — ED PROVIDER NOTES
[address]       Chief Complaint   Patient presents with    Knee Pain     left       Nurses Notes reviewed and I agree except as noted in the HPI. HISTORY OF PRESENT ILLNESS   Aria Aceves is a 50 y.o. female who presents for evaluation. Onset : 20, feel in driveway at home, tripped, denies any factors that may have precipitated the fall    Location: left knee  Duration: constant  Characteristics: aches, sore  Associated symptoms: 2 abrasions  Aggravating factors: walking on it   Relieving factors: as below  Treatments tried: cold pack, elevation, to the abrasions OTC antibiotic ointment      The patient/patient representative has no other acute complaints at this time. REVIEW OF SYSTEMS     Review of Systems   Musculoskeletal: Positive for arthralgias (left knee). Skin: Positive for wound (left knee). PAST MEDICAL HISTORY         Diagnosis Date    Diabetes mellitus type 1, uncontrolled ([facility]     dx 18 years ago    Hypothyroidism        SURGICAL HISTORY     Patient  has a past surgical history that includes Cholecystectomy (2010);  section (, ); Tubal ligation (); Endometrial ablation (); partial hysterectomy (cervix not removed) (4/14/15); Hysterectomy; and shoulder surgery (Left, 2017). CURRENT MEDICATIONS       Discharge Medication List as of 2022 11:55 AM      CONTINUE these medications which have NOT CHANGED    Details   Insulin Disposable Pump (OMNIPOD DASH PDM, GEN 4,) KIT 1 each by Does not apply route once for 1 dose, Disp-1 kit, R-0Normal      Insulin Disposable Pump (OMNIPOD DASH PODS, GEN 4,) MISC 1 each by Does not apply route every 3 days, Disp-10 each, R-5Normal      HUMALOG 100 UNIT/ML injection vial USE AS DIRECTED PER PUMP SETTING, MAX   UNITS DAILY, Disp-30 mL, R-5, DAWNormal      Continuous Blood Gluc  (DEXCOM G6 ) ALONZO As directed.   Dx: IDDM, Disp-1 each, R-0Normal      Continuous Blood Gluc Sensor (DEXCOM G6 SENSOR) MISC As directed. Dx: IDDM, Disp-9 each, R-3Normal      Continuous Blood Gluc Transmit (DEXCOM G6 TRANSMITTER) MISC As directed. Dx: IDDM, Disp-2 each, R-3Normal      lisinopril (ZESTRIL) 2.5 MG tablet Take 1 tablet by mouth daily, Disp-90 tablet, R-3Normal      SYNTHROID 200 MCG tablet Take 1 tablet by mouth daily Take with water on an empty stomach- wait 30 minutes before eating or taking other meds. , Disp-30 tablet, R-11, DAWNormal             ALLERGIES     Patient is is allergic to latex, penicillins, influenza vac typ a&b surf ant, iodine, and morphine. FAMILY HISTORY     Patient'sfamily history includes Cancer in her father and mother; High Blood Pressure in her mother. SOCIAL HISTORY     Patient  reports that she has never smoked. She has never used smokeless tobacco. She reports previous alcohol use. She reports that she does not use drugs. PHYSICAL EXAM     ED TRIAGE VITALS  BP: 132/62, Temp: 98.2 °F (36.8 °C), Pulse: 82, Resp: 16, SpO2: 96 %  Physical Exam  Vitals and nursing note reviewed. Constitutional:       General: She is not in acute distress. Appearance: Normal appearance. She is well-developed and well-groomed. HENT:      Head: Normocephalic and atraumatic. Right Ear: External ear normal.      Left Ear: External ear normal.   Eyes:      Conjunctiva/sclera: Conjunctivae normal.      Right eye: Right conjunctiva is not injected. Left eye: Left conjunctiva is not injected. Pupils: Pupils are equal.   Cardiovascular:      Rate and Rhythm: Normal rate. Pulmonary:      Effort: Pulmonary effort is normal. No respiratory distress. Musculoskeletal:      Cervical back: Normal range of motion. Left knee: Swelling and ecchymosis present. Normal range of motion. Tenderness (generalized) present. Legs:    Skin:     General: Skin is warm and dry.       Findings: No rash (on exposed surfaces). Neurological:      Mental Status: She is alert and oriented to person, place, and time. Gait: Gait is intact. Psychiatric:         Mood and Affect: Mood normal.         Speech: Speech normal.         Behavior: Behavior is cooperative. DIAGNOSTIC RESULTS   Labs:  Abnormal Labs Reviewed - No data to display     IMAGING:  XR KNEE LEFT (MIN 4 VIEWS)   Final Result       1. No acute fracture. 2. Mild diffuse osteopenia. 3. Mild degenerative change. 4. MRI scan may be helpful if the patient has persistent symptoms. .               **This report has been created using voice recognition software. It may contain minor errors which are inherent in voice recognition technology. **      Final report electronically signed by DR Rosi Farrell on 5/9/2022 11:26 AM        URGENT CARE COURSE:     Vitals:    05/09/22 1050   BP: 132/62   Pulse: 82   Resp: 16   Temp: 98.2 °F (36.8 °C)   TempSrc: Temporal   SpO2: 96%   Weight: 225 lb (102.1 kg)   Height: 5' 7\" (1.702 m)       Medications - No data to display  PROCEDURES:  FINALIMPRESSION      1. Abrasion of left knee, initial encounter    2. Contusion of left knee, initial encounter        DISPOSITION/PLAN   DISPOSITION Decision To Discharge 05/09/2022 11:36:20 AM    ED Course as of 05/09/22 1206   Mon May 09, 2022   1205 XR KNEE LEFT (MIN 4 VIEWS) [HA]   1205 As per radiologist read. [facility]      ED Course User Index  [RODRIGUEZ] TIMOTHY Ward - CNP       Problem List Items Addressed This Visit     None      Visit Diagnoses     Abrasion of left knee, initial encounter    -  Primary    Relevant Medications    mupirocin (BACTROBAN) 2 % cream    Contusion of left knee, initial encounter            ACE wrap. Physical assessment findings, diagnostic testing(s) if applicable, and vital signs reviewed with patient/patient representative. Differential diagnosis(s) discussed with patient/patient representative.  Prescription medications and/or over-the-counter medications for symptom management discussed. Patient is to follow-up with family care provider in 2-3 days if no improvement. If symptoms should worsen or change, go to the ED. Patient/patient representative is aware of care plan, questions answered, verbalizes understanding and is in agreement. Printed instructions attached to after visit summary. If COVID-19 positive or COVID-19 by PCR is pending at time of discharge patient is to quarantine/isolate according to [facility] guidelines. PATIENT REFERRED TO:  Robinson Jimenez DO  [address], [address] [place] [address]    Schedule an appointment as soon as possible for a visit in 3 days  For further evaluation. , If symptoms change/worsen, go to the [address]  [phone]  Schedule an appointment as soon as possible for a visit   as needed, For further evaluation. TIMOTHY Ward CNP    Please note that some or all of this chart was generated using [employer] voice recognition software. Although every effort was made to ensure the accuracy of this automated transcription, some errors in transcription may have occurred.         TIMOTHY Ward CNP  05/09/22 6002

## 2022-05-09 NOTE — ED TRIAGE NOTES
Pt to [facility] ambulatory with left knee pain. Pt tripped over the concrete driveway on Saturday. Pt had no LOC. Pt has bruising and abrasions to left knee.

## 2022-05-09 NOTE — Clinical Note
Donal Flores was seen and treated in our emergency department on 5/9/2022. She may return to work on 05/10/2022. If you have any questions or concerns, please don't hesitate to call.       Audrey Mishra, TIMOTHY - CNP

## 2022-05-10 ENCOUNTER — TELEPHONE (OUTPATIENT)
Dept: FAMILY MEDICINE CLINIC | Age: 47
End: 2022-05-10

## 2022-05-10 ENCOUNTER — TELEPHONE (OUTPATIENT)
Dept: INTERNAL MEDICINE CLINIC | Age: 47
End: 2022-05-10

## 2022-05-10 NOTE — TELEPHONE ENCOUNTER
Per Florian Bangura RN, CDE's request, printed off patient's [facility] download. It is attached to this encounter. Please review.

## 2022-05-13 ENCOUNTER — OFFICE VISIT (OUTPATIENT)
Dept: FAMILY MEDICINE CLINIC | Age: 47
End: 2022-05-13
Payer: COMMERCIAL

## 2022-05-13 VITALS
RESPIRATION RATE: 16 BRPM | BODY MASS INDEX: 36.12 KG/M2 | HEART RATE: 76 BPM | DIASTOLIC BLOOD PRESSURE: 70 MMHG | SYSTOLIC BLOOD PRESSURE: 132 MMHG | WEIGHT: 230.6 LBS

## 2022-05-13 DIAGNOSIS — S80.02XA CONTUSION OF LEFT KNEE, INITIAL ENCOUNTER: Primary | ICD-10-CM

## 2022-05-13 DIAGNOSIS — Z91.81 HISTORY OF RECENT FALL: ICD-10-CM

## 2022-05-13 DIAGNOSIS — S80.212A ABRASION OF LEFT KNEE, INITIAL ENCOUNTER: ICD-10-CM

## 2022-05-13 PROCEDURE — 99213 OFFICE O/P EST LOW 20 MIN: CPT | Performed by: FAMILY MEDICINE

## 2022-05-13 PROCEDURE — 1036F TOBACCO NON-USER: CPT | Performed by: FAMILY MEDICINE

## 2022-05-13 PROCEDURE — G8427 DOCREV CUR MEDS BY ELIG CLIN: HCPCS | Performed by: FAMILY MEDICINE

## 2022-05-13 PROCEDURE — G8417 CALC BMI ABV UP PARAM F/U: HCPCS | Performed by: FAMILY MEDICINE

## 2022-05-13 RX ORDER — DOXYCYCLINE HYCLATE 100 MG
100 TABLET ORAL 2 TIMES DAILY
Qty: 14 TABLET | Refills: 0 | Status: SHIPPED | OUTPATIENT
Start: 2022-05-13 | End: 2022-05-20

## 2022-05-13 RX ORDER — TRAMADOL HYDROCHLORIDE 50 MG/1
50 TABLET ORAL EVERY 8 HOURS PRN
Qty: 15 TABLET | Refills: 0 | Status: SHIPPED | OUTPATIENT
Start: 2022-05-13 | End: 2022-05-18

## 2022-05-13 SDOH — ECONOMIC STABILITY: FOOD INSECURITY: WITHIN THE PAST 12 MONTHS, YOU WORRIED THAT YOUR FOOD WOULD RUN OUT BEFORE YOU GOT MONEY TO BUY MORE.: NEVER TRUE

## 2022-05-13 SDOH — ECONOMIC STABILITY: FOOD INSECURITY: WITHIN THE PAST 12 MONTHS, THE FOOD YOU BOUGHT JUST DIDN'T LAST AND YOU DIDN'T HAVE MONEY TO GET MORE.: NEVER TRUE

## 2022-05-13 ASSESSMENT — ENCOUNTER SYMPTOMS
RESPIRATORY NEGATIVE: 1
GASTROINTESTINAL NEGATIVE: 1

## 2022-05-13 ASSESSMENT — SOCIAL DETERMINANTS OF HEALTH (SDOH): HOW HARD IS IT FOR YOU TO PAY FOR THE VERY BASICS LIKE FOOD, HOUSING, MEDICAL CARE, AND HEATING?: NOT HARD AT ALL

## 2022-05-13 NOTE — PROGRESS NOTES
Kourtney Younger (:  1975) is a 50 y.o. female,Established patient, here for evaluation of the following chief complaint(s):  ED Follow-up ( ) and Knee Pain (left, fell on .  )        Subjective   SUBJECTIVE/OBJECTIVE:  HPI:    Chief Complaint   Patient presents with    ED Follow-up          Knee Pain     left, fell on . Pt presents today for UC follow s/p fall a few days ago. Was out cleaning up the yard. Was walking into the house and caught her foot on the concrete and landed on the her left knee. Sent home with Bactroban. Using peroxide also. Concerned about ongoing pain, redness and swelling. xrays neg. Patient Active Problem List   Diagnosis    Diabetes mellitus type 1, uncontrolled ([address]    Hypothyroid    Retinopathy of left eye    Microalbuminuria    Chronic constipation    Controlled type 1 diabetes mellitus without complication, with long-term current use of insulin ([address]    Uncontrolled type 1 diabetes mellitus without complication     Past Surgical History:   Procedure Laterality Date     SECTION  ,     CHOLECYSTECTOMY  2010    ENDOMETRIAL ABLATION  2008    HYSTERECTOMY      PARTIAL HYSTERECTOMY  4/14/15    Dr. Ekaterina Owen ovary present bilateral     SHOULDER SURGERY Left 2017    TUBAL LIGATION  2006     Social History     Tobacco Use    Smoking status: Never Smoker    Smokeless tobacco: Never Used   Vaping Use    Vaping Use: Never used   Substance Use Topics    Alcohol use: Not Currently     Comment: rarely    Drug use: No         Review of Systems   Constitutional: Negative. HENT: Negative. Respiratory: Negative. Cardiovascular: Negative. Gastrointestinal: Negative. Musculoskeletal: Positive for arthralgias (left knee pain) and joint swelling. Skin: Positive for wound. All other systems reviewed and are negative. Objective   Physical Exam  Vitals and nursing note reviewed.    Constitutional: General: She is not in acute distress. Appearance: Normal appearance. She is well-developed. HENT:      Head: Normocephalic and atraumatic. Right Ear: Tympanic membrane normal.      Left Ear: Tympanic membrane normal.   Eyes:      Conjunctiva/sclera: Conjunctivae normal.   Cardiovascular:      Rate and Rhythm: Normal rate and regular rhythm. Heart sounds: Normal heart sounds. No murmur heard. Pulmonary:      Effort: Pulmonary effort is normal.      Breath sounds: Normal breath sounds. No wheezing, rhonchi or rales. Abdominal:      General: There is no distension. Musculoskeletal:      Cervical back: Neck supple. Left knee: Swelling and bony tenderness present. Decreased range of motion. Tenderness present. Skin:     General: Skin is warm and dry. Findings: No rash (on exposed surfaces). Neurological:      General: No focal deficit present. Mental Status: She is alert. Psychiatric:         Attention and Perception: Attention normal.         Mood and Affect: Mood normal.         Speech: Speech normal.         Behavior: Behavior normal. Behavior is cooperative. Thought Content: Thought content normal.         Judgment: Judgment normal.                   ASSESSMENT/PLAN:  1. Contusion of left knee, initial encounter  -     traMADol (ULTRAM) 50 MG tablet; Take 1 tablet by mouth every 8 hours as needed for Pain for up to 5 days. , Disp-15 tablet, R-0Normal  2. Abrasion of left knee, initial encounter  3. History of recent fall    -  ED reports reviewed  -  Wound care instructions given  -  rx doxy given, hold for worsening symptoms  -  Tramadol prn    Return if symptoms worsen or fail to improve. An electronic signature was used to authenticate this note.     --Thien Schulz,

## 2022-05-20 ENCOUNTER — OFFICE VISIT (OUTPATIENT)
Dept: INTERNAL MEDICINE CLINIC | Age: 47
End: 2022-05-20

## 2022-05-20 VITALS — TEMPERATURE: 98.1 F

## 2022-05-20 DIAGNOSIS — E10.9 TYPE 1 DIABETES MELLITUS WITHOUT COMPLICATION (HCC): Primary | ICD-10-CM

## 2022-05-20 NOTE — TELEPHONE ENCOUNTER
Download reviewed. Aria does not want to make any changes at this time.  Working on getting the OmniPod insulin pump

## 2022-05-20 NOTE — PROGRESS NOTES
Aria present  for pump upgrade instructions on OmniPod  insulin pump. Blood sugar today 100 at Fasting. Pump instructions completed per OminPod checklist and scanned in to [facility]'S \[facility] [place] [facility]\"" chart. Settings transferred from [id number] insulin pump to the OmniPod Dash insulin pump. Instructed to check blood sugars 4+ times per day per Dexcom CGM and to call blood sugars to the clinic as needed. Follow up touch base Monday 5/23/22 . Follow up appointment with Maria Fernanda Worthington in 4 week(s) for follow up. Kourtney Carisa states She is willing to participate in this plan of care and verbalized understanding of all instructions provided. Teach back used to verify comprehension. Total time involved in direct patient education: 120 minutes.      PUMP SETTINGS:            Basal rate: 0000 is 1 units/h, 3am 1.25 units/hr, 7pm 1.5 units/ hr, 10pm 1.5 units/ hr            Carbohydrate ratio: 0000 is 7 grams/unit, 6:30am 3.5 grams, 5pm 3.5 grams, 10pm 6.5 grams                         Insulin sensitivity: 0000 is 36 mg/dL per unit; 1pm 36mg      Blood sugar goal: 100 mg      Active insulin: 4 hrs

## 2022-05-26 ENCOUNTER — TELEPHONE (OUTPATIENT)
Dept: INTERNAL MEDICINE CLINIC | Age: 47
End: 2022-05-26

## 2022-06-14 NOTE — TELEPHONE ENCOUNTER
Call to [facility]. Reviewed changes in pump settings to address low blood sugars during the day and higher blood sugars in the evening. Instructed on setting changes as discussed  Basal rate 12am 1 unit/ hr --incr 1.1 units                    3am 1.25 units/hr                    12pm 1.25 units/ hr  --decr 1.1 units                   7pm 1.5 units  --move to 6pm setting                   10pm 1.5 units/ hr  Carb rtaio 12am 7 grams                     6:30am 3.5 grams  --decr 3.8 grams                     5pm 3.5 grams  --incr 3.2 grams                     10pm 6.5 grams   Corrections 12am 36 mg/dL                     1pm 36 mg/dL   --move to 8pm and incr to [facility] voiced understanding via teach back.  Changes made with patient while on the phone

## 2022-06-14 NOTE — TELEPHONE ENCOUNTER
OmniPod and Dexcom CGM downloaded  Lows noted after carb bolus during day hours, but glucose excursions from dinner in the evening. Overnight blood sugars are above goal.    Dr. Wally Omalley,  Please authorize the Diabetes Clinic at [address] [place] [address] to teach/ assist Aria to make the following changes in her insulin pump settings:  Basal rate 12am 1 unit/ hr --incr 1.1 units                    3am 1.25 units/hr                    12pm 1.25 units/ hr  --decr 1.1 units                   7pm 1.5 units  --move to 6pm setting                   10pm 1.5 units/ hr  Carb rtaio 12am 7 grams                     6:30am 3.5 grams  --decr 3.8 grams                     5pm 3.5 grams  --incr 3.2 grams                     10pm 6.5 grams   Corrections 12am 36 mg/dL                     1pm 36 mg/dL   --move to 8pm and incr to 34mg     If you agree, please note and return. Thank you.

## 2022-06-30 ENCOUNTER — OFFICE VISIT (OUTPATIENT)
Dept: INTERNAL MEDICINE CLINIC | Age: 47
End: 2022-06-30
Payer: COMMERCIAL

## 2022-06-30 ENCOUNTER — TELEPHONE (OUTPATIENT)
Dept: INTERNAL MEDICINE CLINIC | Age: 47
End: 2022-06-30

## 2022-06-30 VITALS — HEIGHT: 68 IN | TEMPERATURE: 98.1 F | BODY MASS INDEX: 34.8 KG/M2 | WEIGHT: 229.6 LBS

## 2022-06-30 DIAGNOSIS — E10.65 UNCONTROLLED TYPE 1 DIABETES MELLITUS WITH HYPERGLYCEMIA (HCC): ICD-10-CM

## 2022-06-30 PROCEDURE — G0108 DIAB MANAGE TRN  PER INDIV: HCPCS | Performed by: INTERNAL MEDICINE

## 2022-06-30 NOTE — PROGRESS NOTES
[facility]  [facility]. [address] [address], [facility], [address]  [phone] (phone)  [phone] (fax)    Patient ID: Mayte Nicolas 1975  Referring Provider: Dr. Graham Horvath     Patient's name and  were verified. Subjective:    She presents for Her follow-up diabetic visit. She has type 1 diabetes mellitus. Home regimen includes: Insulin She is noncompliant some of the time. Assessment:     Lab Results   Component Value Date/Time    LABA1C 8.3 2022 06:45 AM    LABA1C 8.0 2022 12:18 PM    BUN 11 10/20/2021 05:12 PM    CREATININE 0.7 10/20/2021 05:12 PM     Vitals:    22 1032   Temp: 98.1 °F (36.7 °C)   Weight: 229 lb 9.6 oz (104.1 kg)   Height: 5' 8\" (1.727 m)     Wt Readings from Last 3 Encounters:   22 229 lb 9.6 oz (104.1 kg)   22 230 lb 9.6 oz (104.6 kg)   22 225 lb (102.1 kg)     Ht Readings from Last 3 Encounters:   22 5' 8\" (1.727 m)   22 5' 7\" (1.702 m)   22 5' 7\" (1.702 m)       Diabetes Pharmacotherapy:  [place] insulin per Omnipod Dash pump    Glucose Trends:   Current monitoring regimen: Dexcom CGM - checks 4+ times daily  Home blood sugar trends:    -V. High: 26%, High: 30%, Target: 42%, Low: 1%, V. Low: 1%              Nocturnal control is stable; rising by 6am; poor corrections and poor evening meal clearance  Any episodes of hypoglycemia?  yes - once in past 2 weeks   -Treats with orange; glucose tablets    Lifestyle Factors:   Previous visit with dietician: yes - 6/3/21  Current diet: B: boiled egg/ cheese/ 1/2 cup coffee            L: Healthy choice steamer 35gm                       D: salad with chicken chunks/ blueberry pie                       Snacks:                       Beverages:  Current exercise: walking every other day; 1 mile                     Has eliptical 2-3 times per week 20-25 mins    Health Maintenance:  Eye exam current (within one year): no Date: 2021, Dr. Paty Clay  Any history of foot problems? no  Foot exam up to date: yes Date: 4/28/22  Immunizations up to date:    Pneumonia: no   COVID-19: yes  The 10-year ASCVD risk score (Jorge Martini., et al., 2013) is: 1.5%    Values used to calculate the score:      Age: 50 years      Sex: Female      Is Non- : No      Diabetic: Yes      Tobacco smoker: No      Systolic Blood Pressure: 788 mmHg      Is BP treated: No      HDL Cholesterol: 64 mg/dL      Total Cholesterol: 170 mg/dL   Last panel - LDL:   Lab Results   Component Value Date    LDLCALC 92 07/07/2021     Taking ASA:  No   Taking statin: No  Last microalbumin/creatinine ratio:   Microalbumin Creatinine Ratio   Date Value Ref Range Status   03/06/2019                    UNABLE TO CALCULATE <30 mg/g Final     Comment:                 NORMAL TO MILDLY INCREASED    <30    mg/g           MODERATELY INCREASED           mg/g           SEVERLY INCREASED             >300   mg/g           Taking ACE/ARB: Yes- lisinopril  Depression screening completed. 4/28/2022    Focus:   Diabetes Education. Last A1C: 8.3% 4/28/22. Weight is stable. Aria reports that 1 of the 2 weeks of Dexcom CGM reviewed was vacation time. It is noted that even back home, glucose levels are running above goal. Rare low is the result of increased activity--reviewed use of temp basal (or unplanned activity=snack replacement). Discussed pump setting changes to address higher readings. She also reports that she and her  are starting to eat 'healthier'. She declines a follow up visit with LANDEN. Exercise is in place. Curriculum Area Focus: Retinal exams, Cardiovascular risk management, Carbohydrate counting/meal planning, Physical activity goals, Hypoglycemia management and Pattern management  DSME PLAN:     1. Make appointment to get your eyes checked!!  2. Get your lab work done before your next appointment with Dr. Karen Carvajal  3.  Basal rates:  12am 1.1 units                             4am  1.35 units 12pm 1.1 units                             6pm 1.55 units      Carb ratio  12am 7 grams                          6:30am 3.8 grams                         5pm 2.8 grams                          10pm 6.5 grams       Sensitivity 12am 32 mg                          8pm 32 mg  Always carry treatment for low blood sugar. Mansoor Thompson glucose tabs, smarties,                       Honey, regular pop or fruit juice  Call the clinic as needed for glucose issues    Goals Addressed                 This Visit's Progress     eat at least 2-3 times each day   On track     Exercise 3x per week (30 min per time)   On track     Reduce calorie intake to <1800 calories per day   Not on track         Meter download, medications, PMH and nursing assessment reviewed. Kourtney Younger states She is willing to participate in this plan of care and verbalized understanding of all instructions provided. Teach back used to verify comprehension. Follow-up: 3 months    Total time involved in direct patient education: 60 minutes.

## 2022-06-30 NOTE — PATIENT INSTRUCTIONS
1. Make appointment to get your eyes checked!!  2. Get your lab work done before your next appointment with Dr. Gege Vidales  3. Basal rates:  12am 1.1 units                             4am  1.35 units                              12pm 1.1 units                             6pm 1.55 units      Carb ratio  12am 7 grams                          6:30am 3.8 grams                         5pm 2.8 grams                          10pm 6.5 grams       Sensitivity 12am 32 mg                          8pm 32 mg  Always carry treatment for low blood sugar. Shaaron Money glucose tabs, smarties,                       Honey, regular pop or fruit juice  Call the clinic as needed for glucose issues

## 2022-06-30 NOTE — TELEPHONE ENCOUNTER
Diabetes education  OmniPod/ Dexcom downloaded  Home blood sugar trends:               -V. High: 26%, High: 30%, Target: 42%, Low: 1%, V. Low: 1%        Nocturnal control is good, but glucose levels rise by 6am. Corrections are not bringing blood sugars down. Evening meal is not clearing. Pump settings:            Basal rates:  12am 1.1 units                             3am  1.25 units                              12pm 1.1 units                             6pm 1.5 units           Carb ratio  12am 7 grams                          6:30am 3.8 grams                         5pm 3.2 grams                          10pm 6.5 grams             Sensitivity 12am 36 mg                          8pm 34 mg    Dr. Sue Gomes,     Please authorize the Diabetes Clinic at [address] [place] [address] to teach/ assist Aria to make the following changes in her insulin pump settings   Basal rates:  12am 1.1 units                             3am  1.25 units  --1.35 units/ change 4am                              12pm 1.1 units                             6pm 1.5  --incr  1.55 units      Carb ratio  12am 7 grams                          6:30am 3.8 grams                         5pm 3.2 grams   --incr 2.8 grams                          10pm 6.5 grams       Sensitivity 12am 36mg   --incr  32 mg                          8pm 34 mg  --incr  32 mg     If you agree, please note and return. Thank you.

## 2022-07-01 NOTE — TELEPHONE ENCOUNTER
Aria instructed to make the following insulin pump setting changes:  Basal rates               12am 1.1 units                3am  1.25 units  --1.35 units/ change 4am               12pm 1.1 units               6pm 1.5  --incr  1.55 units  Carb ratio  12am 7 grams                          6:30am 3.8 grams                         5pm 3.2 grams   --incr 2.8 grams                          10pm 6.5 grams   Sensitivity 12am 36mg   --incr  32 mg                          8pm 34 mg  --incr  32 mg  Aria voiced understanding of these changes via teach back. Assisted Aria in making these changes in her pump.

## 2022-07-08 RX ORDER — INSULIN LISPRO 100 [IU]/ML
INJECTION, SOLUTION INTRAVENOUS; SUBCUTANEOUS
Qty: 30 ML | Refills: 5 | Status: SHIPPED | OUTPATIENT
Start: 2022-07-08

## 2022-07-12 ENCOUNTER — TELEPHONE (OUTPATIENT)
Dept: INTERNAL MEDICINE CLINIC | Age: 47
End: 2022-07-12

## 2022-07-12 NOTE — TELEPHONE ENCOUNTER
Diabetes education  Dexcom/ OmniPod download    BG trends: Very high 5%, High 25%, in range 63%, Low 5%, Very low 2%  Noting blood sugars that remain stable until 2am and rise thr rest of the night. BG's trend low by late morning. Evening BG is variable, but trend above goal.    OmniPod pump settings:  Basal 12am 1.1 units            4am 1.35 units            12pm 1.1 units            5pm 1.55 units  Carb ratio 12am 7 grams                   6:30am 3.8 grams                  5pm 2.8 grams                   10pm 6.5 grams  Sensitivity 12am 32mg                   8pm 32mg    Dr. Clara Miller,     Please authorize the [facility] Clinic at [address] [place] [address] to teach/ assist Aria to make the following changes in her insulin pump settings:  Basal 12am 1.1 units            4am 1.35 units  --move to 2:30am            12pm 1.1 units  --move to 9am            5pm 1.55 units  --increase to 1.6 units  Carb ratio 12am 7 grams  --increase 6.5grams                   6:30am 3.8 grams                  5pm 2.8 grams                   10pm 6.5 grams  Sensitivity 12am 32mg                   8pm 32mg  If you agree, please note and return. Thank you.

## 2022-07-13 NOTE — TELEPHONE ENCOUNTER
Call to [facility] on the insulin pump setting changes as discussed  Basal 12am 1.1 units            4am 1.35 units  --move to 2:30am            12pm 1.1 units  --move to 9am            5pm 1.55 units  --increase to 1.6 units  Carb ratio 12am 7 grams  --increase 6.5grams                   6:30am 3.8 grams                  5pm 2.8 grams                   10pm 6.5 grams  Sensitivity 12am 32mg                   8pm 32mg  Crystal made the changes while on the phone. She voiced understanding of these change via teach back.

## 2022-07-19 ENCOUNTER — HOSPITAL ENCOUNTER (EMERGENCY)
Age: 47
Discharge: HOME | End: 2022-07-19
Attending: NURSE PRACTITIONER
Payer: COMMERCIAL

## 2022-07-19 VITALS
OXYGEN SATURATION: 95 % | HEIGHT: 68 IN | WEIGHT: 230 LBS | DIASTOLIC BLOOD PRESSURE: 68 MMHG | SYSTOLIC BLOOD PRESSURE: 135 MMHG | TEMPERATURE: 99.8 F | HEART RATE: 103 BPM | RESPIRATION RATE: 14 BRPM | BODY MASS INDEX: 34.86 KG/M2

## 2022-07-19 DIAGNOSIS — U07.1 COVID: Primary | ICD-10-CM

## 2022-07-19 LAB — SARS-COV-2, NAA: DETECTED

## 2022-07-19 PROCEDURE — 87635 SARS-COV-2 COVID-19 AMP PRB: CPT

## 2022-07-19 PROCEDURE — 99213 OFFICE O/P EST LOW 20 MIN: CPT

## 2022-07-19 ASSESSMENT — PAIN SCALES - GENERAL: PAINLEVEL_OUTOF10: 6

## 2022-07-19 ASSESSMENT — ENCOUNTER SYMPTOMS
EYES NEGATIVE: 1
SHORTNESS OF BREATH: 0
RHINORRHEA: 0
SINUS CONGESTION: 1
COUGH: 1
SORE THROAT: 1
GASTROINTESTINAL NEGATIVE: 1

## 2022-07-19 ASSESSMENT — PAIN - FUNCTIONAL ASSESSMENT: PAIN_FUNCTIONAL_ASSESSMENT: 0-10

## 2022-07-19 ASSESSMENT — PAIN DESCRIPTION - LOCATION: LOCATION: THROAT

## 2022-07-19 NOTE — Clinical Note
Gisela Desouza was seen and treated in our emergency department on 7/19/2022. She may return to work on 07/25/2022. If you have any questions or concerns, please don't hesitate to call.       Elba Ingram, TIMOTHY - CNP

## 2022-07-19 NOTE — ED PROVIDER NOTES
UNITS DAILY, Disp-30 mL, R-5, DAWNormal      Insulin Disposable Pump (OMNIPOD DASH PDM, GEN 4,) KIT 1 each by Does not apply route once for 1 dose, Disp-1 kit, R-0Normal      Insulin Disposable Pump (OMNIPOD DASH PODS, GEN 4,) MISC 1 each by Does not apply route every 3 days, Disp-10 each, R-5Normal      Continuous Blood Gluc  (DEXCOM G6 ) ALONZO As directed. Dx: IDDM, Disp-1 each, R-0Normal      Continuous Blood Gluc Sensor (DEXCOM G6 SENSOR) MISC As directed. Dx: IDDM, Disp-9 each, R-3Normal      Continuous Blood Gluc Transmit (DEXCOM G6 TRANSMITTER) MISC As directed. Dx: IDDM, Disp-2 each, R-3Normal      lisinopril (ZESTRIL) 2.5 MG tablet Take 1 tablet by mouth daily, Disp-90 tablet, R-3Normal      SYNTHROID 200 MCG tablet Take 1 tablet by mouth daily Take with water on an empty stomach- wait 30 minutes before eating or taking other meds. , Disp-30 tablet, R-11, DAWNormal             ALLERGIES     Patient is is allergic to latex, penicillins, influenza vac typ a&b surf ant, iodine, and morphine. FAMILY HISTORY     Patient'sfamily history includes Cancer in her father and mother; High Blood Pressure in her mother. SOCIAL HISTORY     Patient  reports that she has never smoked. She has never used smokeless tobacco. She reports that she does not currently use alcohol. She reports that she does not use drugs. PHYSICAL EXAM     ED TRIAGE VITALS  BP: 135/68, Temp: 99.8 °F (37.7 °C), Heart Rate: (!) 103, Resp: 14, SpO2: 95 %  Physical Exam  Vitals and nursing note reviewed. Constitutional:       General: She is not in acute distress. Appearance: She is well-developed. HENT:      Head: Normocephalic and atraumatic. Right Ear: Hearing, ear canal and external ear normal. Tympanic membrane is erythematous. Left Ear: Hearing, ear canal and external ear normal. Tympanic membrane is erythematous. Nose: Nose normal.      Right Turbinates: Swollen. Left Turbinates: Swollen. Mouth/Throat:      Mouth: Mucous membranes are moist.      Pharynx: Posterior oropharyngeal erythema present. No oropharyngeal exudate or uvula swelling. Tonsils: 1+ on the right. 1+ on the left. Eyes:      Conjunctiva/sclera: Conjunctivae normal.      Pupils: Pupils are equal, round, and reactive to light. Neck:      Thyroid: No thyromegaly. Vascular: No JVD. Trachea: No tracheal deviation. Cardiovascular:      Rate and Rhythm: Normal rate and regular rhythm. Heart sounds: Normal heart sounds. No murmur heard. No friction rub. No gallop. Pulmonary:      Effort: Pulmonary effort is normal. No respiratory distress. Breath sounds: Normal breath sounds. No stridor. No wheezing or rales. Chest:      Chest wall: No tenderness. Abdominal:      General: Bowel sounds are normal.      Palpations: Abdomen is soft. Tenderness: There is no abdominal tenderness. Musculoskeletal:      Cervical back: Normal range of motion and neck supple. Lymphadenopathy:      Cervical: No cervical adenopathy. Skin:     General: Skin is warm and dry. Neurological:      Mental Status: She is alert and oriented to person, place, and time. Psychiatric:         Behavior: Behavior normal.         Judgment: Judgment normal.       DIAGNOSTIC RESULTS   Labs:   Results for orders placed or performed during the hospital encounter of 07/19/22   COVID-19, Rapid   Result Value Ref Range    SARS-CoV-2, BRENDAN DETECTED (AA) NOT DETECTED       IMAGING:  No orders to display     URGENT CARE COURSE:     Vitals:    07/19/22 1625   BP: 135/68   Pulse: (!) 103   Resp: 14   Temp: 99.8 °F (37.7 °C)   SpO2: 95%   Weight: 230 lb (104.3 kg)   Height: 5' 8\" (1.727 m)       Medications - No data to display  PROCEDURES:  None  FINALIMPRESSION      1. COVID        DISPOSITION/PLAN   DISPOSITION Decision To Discharge 07/19/2022 04:57:56 PM  CDC isolation and treatment recommendations reviewed with the patient.    PATIENT REFERRED TO:  Prakash Velásquez DO  [facility], [address] [place] [address]  [phone]    In 2 days  If symptoms worsen  DISCHARGE MEDICATIONS:  Discharge Medication List as of 7/19/2022  4:58 PM        Discharge Medication List as of 7/19/2022  4:58 PM          Wendy Salvador, [address] [facility] [address], APRN [facility]  07/19/22 1707

## 2022-07-19 NOTE — ED TRIAGE NOTES
To room 1 c/o ocugh congestion sore throat haeadche fatigue body aches headache.   Negative covid test at home

## 2022-07-22 ENCOUNTER — TELEMEDICINE (OUTPATIENT)
Dept: FAMILY MEDICINE CLINIC | Age: 47
End: 2022-07-22
Payer: COMMERCIAL

## 2022-07-22 DIAGNOSIS — R49.0 HOARSENESS: ICD-10-CM

## 2022-07-22 DIAGNOSIS — R09.89 CHEST CONGESTION: ICD-10-CM

## 2022-07-22 DIAGNOSIS — E10.9 TYPE 1 DIABETES MELLITUS WITHOUT COMPLICATION (HCC): ICD-10-CM

## 2022-07-22 DIAGNOSIS — U07.1 COVID-19: Primary | ICD-10-CM

## 2022-07-22 PROCEDURE — 2022F DILAT RTA XM EVC RTNOPTHY: CPT | Performed by: NURSE PRACTITIONER

## 2022-07-22 PROCEDURE — 99213 OFFICE O/P EST LOW 20 MIN: CPT | Performed by: NURSE PRACTITIONER

## 2022-07-22 PROCEDURE — 3052F HG A1C>EQUAL 8.0%<EQUAL 9.0%: CPT | Performed by: NURSE PRACTITIONER

## 2022-07-22 PROCEDURE — G8427 DOCREV CUR MEDS BY ELIG CLIN: HCPCS | Performed by: NURSE PRACTITIONER

## 2022-07-22 RX ORDER — ALBUTEROL SULFATE 2.5 MG/3ML
2.5 SOLUTION RESPIRATORY (INHALATION) EVERY 6 HOURS PRN
Qty: 120 EACH | Refills: 3 | Status: SHIPPED | OUTPATIENT
Start: 2022-07-22 | End: 2022-07-22 | Stop reason: SDUPTHER

## 2022-07-22 RX ORDER — AZITHROMYCIN 250 MG/1
TABLET, FILM COATED ORAL
Qty: 6 TABLET | Refills: 0 | Status: SHIPPED | OUTPATIENT
Start: 2022-07-22 | End: 2022-07-28 | Stop reason: SDUPTHER

## 2022-07-22 RX ORDER — ALBUTEROL SULFATE 2.5 MG/3ML
2.5 SOLUTION RESPIRATORY (INHALATION) EVERY 6 HOURS PRN
Qty: 1 EACH | Refills: 0 | Status: SHIPPED | OUTPATIENT
Start: 2022-07-22 | End: 2022-09-29

## 2022-07-22 ASSESSMENT — ENCOUNTER SYMPTOMS
SHORTNESS OF BREATH: 0
VOICE CHANGE: 1
COUGH: 1
SORE THROAT: 1
NAUSEA: 0
ABDOMINAL PAIN: 0
RHINORRHEA: 1

## 2022-07-22 NOTE — LETTER
[address] [id number]  Phone: [phone]  Fax: [facility], APRN - CNP        July 22, 2022     Patient: Gee Samuels   YOB: 1975   Date of Visit: 7/22/2022       To Whom It May Concern: It is my medical opinion that Annye Cassette should remain out of work until 7/27/22 due to continued symptoms of COVID-19 dx on 7/19/22. If you have any questions or concerns, please don't hesitate to call.     Sincerely,        Mauri Mohs, TIMOTHY - CNP

## 2022-07-22 NOTE — PROGRESS NOTES
Subjective:      Patient ID: Nani Kwon is a 50 y.o. female    HPI: Acute for [address] -- Audio/Visual (During RVSXM-60 public health emergency)    Patient identification was verified at the start of the visit: Yes    Services were provided through a video synchronous discussion virtually to substitute for in-person clinic visit. Patient and provider were located at their individual homes. Patient has requested an audio/video evaluation for the following concern(s):    Chief Complaint   Patient presents with    Post-COVID Symptoms     Dx 7/19/22 - onset of 7/17/22       Onset of 7/17/22 with cough. Seen at [facility] 7/19/22 POS for COVID. Placed on Paxlovid at that time. Taking with no issues    COVID x 2. Continues with hoarseness, fever/chills, chest congestion, sinus congestion. Symptoms seem to be settling in chest.  Denies tightness or SOB. Continues with fatigue and achiness. Appetite poor. Sugars are elevated but controlled. Patient Active Problem List   Diagnosis    Diabetes mellitus type 1, uncontrolled (HCC)    Hypothyroid    Retinopathy of left eye    Microalbuminuria    Chronic constipation    Controlled type 1 diabetes mellitus without complication, with long-term current use of insulin ([address]    Uncontrolled type 1 diabetes mellitus without complication       Review of Systems   Constitutional:  Positive for activity change, appetite change and fatigue. Negative for chills and fever. HENT:  Positive for congestion, postnasal drip, rhinorrhea, sore throat and voice change. Respiratory:  Positive for cough. Negative for shortness of breath. Cardiovascular:  Negative for chest pain. Gastrointestinal:  Negative for abdominal pain and nausea. Skin:  Negative for rash. Neurological:  Positive for headaches. Negative for dizziness and light-headedness. Psychiatric/Behavioral: Negative.        Objective:   Physical Exam  Constitutional:       General:

## 2022-07-26 ENCOUNTER — TELEMEDICINE (OUTPATIENT)
Dept: FAMILY MEDICINE CLINIC | Age: 47
End: 2022-07-26
Payer: COMMERCIAL

## 2022-07-26 DIAGNOSIS — R49.0 HOARSENESS: ICD-10-CM

## 2022-07-26 DIAGNOSIS — R09.89 CHEST CONGESTION: ICD-10-CM

## 2022-07-26 DIAGNOSIS — U07.1 COVID-19: Primary | ICD-10-CM

## 2022-07-26 DIAGNOSIS — E66.9 OBESITY (BMI 30-39.9): ICD-10-CM

## 2022-07-26 DIAGNOSIS — E10.65 UNCONTROLLED TYPE 1 DIABETES MELLITUS WITH HYPERGLYCEMIA (HCC): ICD-10-CM

## 2022-07-26 PROCEDURE — 1036F TOBACCO NON-USER: CPT | Performed by: FAMILY MEDICINE

## 2022-07-26 PROCEDURE — 2022F DILAT RTA XM EVC RTNOPTHY: CPT | Performed by: FAMILY MEDICINE

## 2022-07-26 PROCEDURE — 3052F HG A1C>EQUAL 8.0%<EQUAL 9.0%: CPT | Performed by: FAMILY MEDICINE

## 2022-07-26 PROCEDURE — G8428 CUR MEDS NOT DOCUMENT: HCPCS | Performed by: FAMILY MEDICINE

## 2022-07-26 PROCEDURE — 99214 OFFICE O/P EST MOD 30 MIN: CPT | Performed by: FAMILY MEDICINE

## 2022-07-26 PROCEDURE — G8417 CALC BMI ABV UP PARAM F/U: HCPCS | Performed by: FAMILY MEDICINE

## 2022-07-26 ASSESSMENT — ENCOUNTER SYMPTOMS
SINUS PRESSURE: 1
COUGH: 1
WHEEZING: 0
GASTROINTESTINAL NEGATIVE: 1
SHORTNESS OF BREATH: 0
CHEST TIGHTNESS: 0
RHINORRHEA: 1

## 2022-07-26 NOTE — PROGRESS NOTES
Kourtney Younger (:  1975) is a Established patient, here for evaluation of the following:           Subjective   HPI:    Chief Complaint   Patient presents with    Jc Del Rio Prior:   I went to urgent care having alow grade fever and sore throat. They did a covid test and I tested positive. They prescribed Paxlovid. I have taken all of that medicine. I had a follow up appt. with Jennifer Jazlyn on Friday, still feeling pretty bad. He gave me a Z-silas and a nebulizer. I am still congested, stuffy head. I am supposed to have an appt with you pn Thurs. but you probably dont want me to come in. Should I call and set up an e-visit? I have been off work since Last Wednesday and my work will need a slip from you to return. Would like to speak with you. Please respond and let me know what I need to do. Pt completed the course of Paxlovid and Zpak. Feels that her sinuses are starting to break up. No recent fevers. Patient Active Problem List   Diagnosis    Diabetes mellitus type 1, uncontrolled ([address]    Hypothyroid    Retinopathy of left eye    Microalbuminuria    Chronic constipation    Controlled type 1 diabetes mellitus without complication, with long-term current use of insulin ([address]    Uncontrolled type 1 diabetes mellitus without complication     Past Surgical History:   Procedure Laterality Date     SECTION  ,     CHOLECYSTECTOMY  2010    ENDOMETRIAL ABLATION  2008    HYSTERECTOMY (CERVIX STATUS UNKNOWN)      PARTIAL HYSTERECTOMY (CERVIX NOT REMOVED)  4/14/15    Dr. Michelle Martínez ovary present bilateral     SHOULDER SURGERY Left 2017    TUBAL LIGATION  2006     Social History     Tobacco Use    Smoking status: Never    Smokeless tobacco: Never   Vaping Use    Vaping Use: Never used   Substance Use Topics    Alcohol use: Not Currently     Comment: rarely    Drug use: No       Review of Systems   Constitutional:  Positive for fatigue. Negative for fever. HENT:  Positive for congestion, postnasal drip, rhinorrhea and sinus pressure. Respiratory:  Positive for cough. Negative for chest tightness, shortness of breath and wheezing. Cardiovascular: Negative. Gastrointestinal: Negative. Musculoskeletal:  Positive for myalgias. All other systems reviewed and are negative. Objective   Patient-Reported Vitals  No data recorded     Physical Exam  Constitutional:       General: She is not in acute distress. Appearance: Normal appearance. She is well-developed. She is ill-appearing. HENT:      Head: Normocephalic and atraumatic. Right Ear: External ear normal.      Left Ear: External ear normal.   Eyes:      Conjunctiva/sclera: Conjunctivae normal.   Pulmonary:      Effort: Pulmonary effort is normal. No respiratory distress. Skin:     Findings: No rash (on exposed surfaces). Neurological:      Mental Status: She is alert and oriented to person, place, and time. Psychiatric:         Mood and Affect: Mood normal.         Behavior: Behavior normal.         Thought Content: Thought content normal.         Judgment: Judgment normal.       Assessment & Plan   Below is the assessment and plan developed based on review of pertinent history, physical exam, labs, studies, and medications. 1. COVID-19  2. Chest congestion  3. Hoarseness  4. Uncontrolled type 1 diabetes mellitus with hyperglycemia (HCC)  5. Obesity (BMI 30-39.9)    -  UC and PCP notes reviewed  -  S/P completion of Paxlovid and Sung Rinks  -  Starting to feel a little better but not quite ready to RTW  -  Work slip given  -  Monitor sugars closely    Return in about 3 months (around 10/26/2022) for IDDM. Kourtney Younger, was evaluated through a synchronous (real-time) audio-video encounter. The patient (or guardian if applicable) is aware that this is a billable service, which includes applicable co-pays.  This Virtual Visit was conducted with patient's (and/or legal guardian's) consent. The visit was conducted pursuant to the emergency declaration under the [facility] [address] authority and the [employer] and [employer] General Act. Patient identification was verified, and a caregiver was present when appropriate. The patient was located at Home: [address]. Provider was located at [facility] (Appt Dept): [address]  [facility] [address],  [address]         --Anjali Godoy, DO

## 2022-07-26 NOTE — LETTER
[address] [address]  Phone: [phone]  Fax: [address],         July 26, 2022     Patient: Girma Gadrner   YOB: 1975   Date of Visit: 7/26/2022       To Whom It May Concern: It is my medical opinion that [employer] may return to work on 8/1/22 with no restrictions. If you have any questions or concerns, please don't hesitate to call.     Sincerely,        Brittany Murphy, DO

## 2022-07-28 DIAGNOSIS — R09.89 CHEST CONGESTION: ICD-10-CM

## 2022-07-28 RX ORDER — AZITHROMYCIN 250 MG/1
TABLET, FILM COATED ORAL
Qty: 6 TABLET | Refills: 0 | Status: SHIPPED | OUTPATIENT
Start: 2022-07-28 | End: 2022-08-07

## 2022-08-01 ENCOUNTER — PATIENT MESSAGE (OUTPATIENT)
Dept: FAMILY MEDICINE CLINIC | Age: 47
End: 2022-08-01

## 2022-08-01 ENCOUNTER — TELEPHONE (OUTPATIENT)
Dept: INTERNAL MEDICINE CLINIC | Age: 47
End: 2022-08-01

## 2022-08-01 DIAGNOSIS — J22 LRTI (LOWER RESPIRATORY TRACT INFECTION): Primary | ICD-10-CM

## 2022-08-01 NOTE — TELEPHONE ENCOUNTER
Diabetes education  OmniPod pump and Dexcom CGM downloaded  Aria is having low blood sugars later in the evening and through the night. She is having glucose excursions during the day. It appears she is not bolusing for the carbs she is eating during the day. Instructed to bolus for all carbs eaten. Pump settings  Basal 12am 1.1 units            2:30am 1.35 units            9am 1.1 units            5pm 1.6 units  Carb ratio:  12am 7 grams                6:30am 3.8 grams               5pm 2.8 grams               10pm 6.5 grams  Sensitivity 12am 32 mg                  8pm 32mg    Dr. Angie Bradley,   Please authorize the Diabetes Clinic at [address] [place] [address] to teach/ assist Aria to make the following changes in her insulin pump setting:   Basal 12am 1.1 units  --decrease 1.0 units            2:30am 1.35 units  --decrease 1.25 units            9am 1.1 units            5pm 1.6 units                                Add 8pm basal 1.45 units  Carb ratio:  12am 7 grams                6:30am 3.8 grams               5pm 2.8 grams               10pm 6.5 grams    If you agree, please note and return. Thank you.

## 2022-08-02 ENCOUNTER — PATIENT MESSAGE (OUTPATIENT)
Dept: FAMILY MEDICINE CLINIC | Age: 47
End: 2022-08-02

## 2022-08-02 DIAGNOSIS — J22 LRTI (LOWER RESPIRATORY TRACT INFECTION): Primary | ICD-10-CM

## 2022-08-02 RX ORDER — AZITHROMYCIN 250 MG/1
250 TABLET, FILM COATED ORAL SEE ADMIN INSTRUCTIONS
Qty: 6 TABLET | Refills: 0 | Status: SHIPPED | OUTPATIENT
Start: 2022-08-02 | End: 2022-08-07

## 2022-08-02 RX ORDER — DOXYCYCLINE HYCLATE 100 MG
100 TABLET ORAL 2 TIMES DAILY
Qty: 14 TABLET | Refills: 0 | Status: SHIPPED | OUTPATIENT
Start: 2022-08-02 | End: 2022-08-09

## 2022-08-02 NOTE — TELEPHONE ENCOUNTER
From: Braulio Saavedra  To: Dr. Mervin Koch  Sent: 8/1/2022 5:44 PM EDT  Subject: Covid    Dr. Flores Miners,    I am really struggling with this. I returned to work today and could not stay. I am coughing and my sinuses are so swollen. There is pressure in my ears, I dont feel good. I did test negative on Sunday. When I get up and move around I have shortness of breath. My O2 level is usually 92-94. My cough is tight with a little phlegm sometimes. The only thing I am taking right now is Sudafed- D and Vitamin C. This is the start of week 3. I dont want this to turn into something worse. Do I give it more time or is there something I can do? I know your probably sick of hearing from me, I am just a little concerned.     Naeem Williamson

## 2022-08-02 NOTE — TELEPHONE ENCOUNTER
From: Aria Saavedra  To: Dr. Negar Conley  Sent: 8/2/2022 1:36 PM EDT  Subject: Covid    Hello    I have taken doxycycline the last time I had Covid and it made me sick. I had to go to hospital because I couldn't quit throwing up. I am so sorry for being a pain. . This pressure in my ears and nose is making me very irritable and I donT think I could add getting sick. Is there anything else I could take?     Akash Sifuentes

## 2022-08-09 ENCOUNTER — OFFICE VISIT (OUTPATIENT)
Dept: FAMILY MEDICINE CLINIC | Age: 47
End: 2022-08-09
Payer: COMMERCIAL

## 2022-08-09 VITALS
WEIGHT: 221.5 LBS | HEART RATE: 92 BPM | SYSTOLIC BLOOD PRESSURE: 126 MMHG | TEMPERATURE: 98.8 F | BODY MASS INDEX: 33.68 KG/M2 | RESPIRATION RATE: 18 BRPM | DIASTOLIC BLOOD PRESSURE: 64 MMHG

## 2022-08-09 DIAGNOSIS — J22 LRTI (LOWER RESPIRATORY TRACT INFECTION): Primary | ICD-10-CM

## 2022-08-09 DIAGNOSIS — U07.1 COVID: ICD-10-CM

## 2022-08-09 PROBLEM — S93.601A SPRAIN OF RIGHT FOOT: Status: ACTIVE | Noted: 2022-08-09

## 2022-08-09 PROBLEM — S92.309A CLOSED FRACTURE OF METATARSAL BONE: Status: ACTIVE | Noted: 2022-08-09

## 2022-08-09 PROCEDURE — 99214 OFFICE O/P EST MOD 30 MIN: CPT | Performed by: FAMILY MEDICINE

## 2022-08-09 PROCEDURE — G8427 DOCREV CUR MEDS BY ELIG CLIN: HCPCS | Performed by: FAMILY MEDICINE

## 2022-08-09 PROCEDURE — 1036F TOBACCO NON-USER: CPT | Performed by: FAMILY MEDICINE

## 2022-08-09 PROCEDURE — G8417 CALC BMI ABV UP PARAM F/U: HCPCS | Performed by: FAMILY MEDICINE

## 2022-08-09 RX ORDER — PREDNISONE 20 MG/1
1 TABLET ORAL DAILY
COMMUNITY
Start: 2022-08-03 | End: 2022-09-29

## 2022-08-09 RX ORDER — LEVOFLOXACIN 750 MG/1
750 TABLET ORAL DAILY
Qty: 5 TABLET | Refills: 0 | Status: SHIPPED | OUTPATIENT
Start: 2022-08-09 | End: 2022-08-14

## 2022-08-09 RX ORDER — DEXTROMETHORPHAN HYDROBROMIDE AND PROMETHAZINE HYDROCHLORIDE 15; 6.25 MG/5ML; MG/5ML
5 SYRUP ORAL 4 TIMES DAILY PRN
Qty: 120 ML | Refills: 0 | Status: SHIPPED | OUTPATIENT
Start: 2022-08-09 | End: 2022-08-15

## 2022-08-09 NOTE — LETTER
[address] [place] [id number]  Phone: [phone]  Fax: [address] Pierson DO        August 9, 2022     Patient: Shon Campo   YOB: 1975   Date of Visit: 8/9/2022       To Whom It May Concern: It is my medical opinion that [employer] may return to work on 8/15/22 with no restrictions. Please excuse from work 8/8/22 thru 8/14/22. If you have any questions or concerns, please don't hesitate to call.     Sincerely,        Heinz Denver, DO

## 2022-08-09 NOTE — PROGRESS NOTES
Kourtney Younger (:  1975) is a 50 y.o. female,Established patient, here for evaluation of the following chief complaint(s):  Post-COVID Symptoms (Cough, head congestion  and pressure)        Subjective   SUBJECTIVE/OBJECTIVE:  HPI:    Chief Complaint   Patient presents with    Post-COVID Symptoms     Cough, head congestion  and pressure     Pt here for ongoing cough, head congestion, sinus pressure that has been ongoing since her COVID dx. Has completed a round of Zpak x 2 as well as prednisone. Still with a lot of ear pressure and productive cough. No fevers but feels worn out. Patient Active Problem List   Diagnosis    Diabetes mellitus type 1, uncontrolled ([address]    Hypothyroid    Retinopathy of left eye    Microalbuminuria    Chronic constipation    Controlled type 1 diabetes mellitus without complication, with long-term current use of insulin ([address]    Uncontrolled type 1 diabetes mellitus without complication    Closed fracture of metatarsal bone    Sprain of right foot     Past Surgical History:   Procedure Laterality Date     SECTION  , 2006    CHOLECYSTECTOMY  2010    ENDOMETRIAL ABLATION      HYSTERECTOMY (CERVIX STATUS UNKNOWN)      PARTIAL HYSTERECTOMY (CERVIX NOT REMOVED)  4/14/15    Dr. [place] ovary present bilateral     SHOULDER SURGERY Left 2017    TUBAL LIGATION  2006     Social History     Tobacco Use    Smoking status: Never    Smokeless tobacco: Never   Vaping Use    Vaping Use: Never used   Substance Use Topics    Alcohol use: Not Currently     Comment: rarely    Drug use: No         Review of Systems   Constitutional:  Positive for fatigue. Negative for fever. HENT:  Positive for congestion, ear pain and sinus pressure. Respiratory:  Positive for cough. Negative for chest tightness, shortness of breath and wheezing. Cardiovascular: Negative. Gastrointestinal: Negative. Musculoskeletal: Negative.     All other systems reviewed and are negative. Objective   Physical Exam  Vitals and nursing note reviewed. Constitutional:       General: She is not in acute distress. Appearance: Normal appearance. She is well-developed. HENT:      Head: Normocephalic and atraumatic. Right Ear: A middle ear effusion is present. Left Ear: A middle ear effusion is present. Eyes:      Conjunctiva/sclera: Conjunctivae normal.   Cardiovascular:      Rate and Rhythm: Normal rate and regular rhythm. Heart sounds: Normal heart sounds. No murmur heard. Pulmonary:      Effort: Pulmonary effort is normal.      Breath sounds: Normal breath sounds. No wheezing, rhonchi or rales. Abdominal:      General: There is no distension. Musculoskeletal:      Cervical back: Neck supple. Skin:     General: Skin is warm and dry. Findings: No rash (on exposed surfaces). Neurological:      General: No focal deficit present. Mental Status: She is alert. Psychiatric:         Attention and Perception: Attention normal.         Mood and Affect: Mood normal.         Speech: Speech normal.         Behavior: Behavior normal. Behavior is cooperative. Thought Content: Thought content normal.         Judgment: Judgment normal.             ASSESSMENT/PLAN:  1. LRTI (lower respiratory tract infection)  -     levoFLOXacin (LEVAQUIN) 750 MG tablet; Take 1 tablet by mouth in the morning for 5 days. , Disp-5 tablet, R-0Normal  -     promethazine-dextromethorphan (PROMETHAZINE-DM) 6.25-15 MG/5ML syrup; Take 5 mLs by mouth 4 times daily as needed for Cough, Disp-120 mL, R-0Normal  2. COVID    -  Likely post-COVID in nature  -  Has failed Zpak x 2 as well as prednisone  -  Rx's above    Return if symptoms worsen or fail to improve. Labs, CXR, +/- CT sinus at that time if still no improvement. An electronic signature was used to authenticate this note.     --Noel Hooper, DO

## 2022-08-10 ENCOUNTER — HOSPITAL ENCOUNTER (OUTPATIENT)
Dept: GENERAL RADIOLOGY | Age: 47
Discharge: HOME | End: 2022-08-10
Payer: COMMERCIAL

## 2022-08-10 ENCOUNTER — HOSPITAL ENCOUNTER (OUTPATIENT)
Age: 47
Discharge: HOME | End: 2022-08-10
Payer: COMMERCIAL

## 2022-08-10 DIAGNOSIS — R05.3 PERSISTENT COUGH: Primary | ICD-10-CM

## 2022-08-10 DIAGNOSIS — R05.3 PERSISTENT COUGH: ICD-10-CM

## 2022-08-10 PROCEDURE — 71046 X-RAY EXAM CHEST 2 VIEWS: CPT

## 2022-08-10 ASSESSMENT — ENCOUNTER SYMPTOMS
SINUS PRESSURE: 1
COUGH: 1
CHEST TIGHTNESS: 0
SHORTNESS OF BREATH: 0
GASTROINTESTINAL NEGATIVE: 1
WHEEZING: 0

## 2022-08-16 ENCOUNTER — TELEPHONE (OUTPATIENT)
Dept: INTERNAL MEDICINE CLINIC | Age: 47
End: 2022-08-16

## 2022-08-16 NOTE — TELEPHONE ENCOUNTER
Per Georgina Canales RN, CDE's request, downloaded patients DexCom and OmniPod. It is attached to this encounter. Please review.  Thank you

## 2022-08-17 NOTE — TELEPHONE ENCOUNTER
Aria instructed on insulin pump setting changes as discussed on 8/3/2022. Basal 12am 1.1 units  --decrease 1.0 units            2:30am 1.35 units  --decrease 1.25 units            9am 1.1 units            5pm 1.6 units                                Add 8pm basal 1.45 units  Carb ratio:  12am 7 grams                6:30am 3.8 grams               5pm 2.8 grams               10pm 6.5 grams  Aria voiced understanding of these changes via teach back.

## 2022-09-13 DIAGNOSIS — E10.65 UNCONTROLLED TYPE 1 DIABETES MELLITUS WITH HYPERGLYCEMIA (HCC): ICD-10-CM

## 2022-09-13 RX ORDER — BLOOD-GLUCOSE SENSOR
EACH MISCELLANEOUS
Qty: 9 EACH | Refills: 3 | Status: SHIPPED | OUTPATIENT
Start: 2022-09-13

## 2022-09-29 ENCOUNTER — PHARMACY VISIT (OUTPATIENT)
Dept: INTERNAL MEDICINE CLINIC | Age: 47
End: 2022-09-29
Payer: COMMERCIAL

## 2022-09-29 DIAGNOSIS — E10.65 UNCONTROLLED TYPE 1 DIABETES MELLITUS WITH HYPERGLYCEMIA (HCC): Primary | ICD-10-CM

## 2022-09-29 LAB — HBA1C MFR BLD: 7 %

## 2022-09-29 PROCEDURE — 83036 HEMOGLOBIN GLYCOSYLATED A1C: CPT | Performed by: INTERNAL MEDICINE

## 2022-09-29 PROCEDURE — G0108 DIAB MANAGE TRN  PER INDIV: HCPCS | Performed by: INTERNAL MEDICINE

## 2022-09-29 NOTE — PATIENT INSTRUCTIONS
Try to have something for breakfast - consider a yogurt drink  -This will help you make smarter work snacking choices    2. Try to make an effort to be more consistent with your boluses as you get back into your routine, especially with your supper boluses   -Try to use the missed bolus reminder for supper    3. Consider using the elliptical for 15 minutes 2 days a week in the morning    4. We will take a look at your download in 2 weeks to assess for changes - we will send a Zaarly message if we want any changes    5. Time to schedule your diabetes eye exam    6.  Consider the COVID booster, Prevnar 20 vaccine, and flu vaccine at your local pharmacy

## 2022-09-29 NOTE — PROGRESS NOTES
[facility]  [address] [address], [facility], [address]  [phone] (phone)  [phone] (fax)    Patient ID: Salty Krause 1975  Referring Provider: Dr. Taylor Silva     Patient's name and  were verified. Subjective:    She presents for Her follow-up diabetic visit. She has type 1 diabetes mellitus. Home regimen includes: Insulin She is noncompliant some of the time. Assessment:     Lab Results   Component Value Date/Time    LABA1C 8.3 2022 06:45 AM    LABA1C 8.0 2022 12:18 PM    BUN 6 2022 02:54 PM    CREATININE 0.75 2022 02:54 PM     Wt Readings from Last 3 Encounters:   22 221 lb 8 oz (100.5 kg)   22 230 lb (104.3 kg)   22 229 lb 9.6 oz (104.1 kg)     Ht Readings from Last 3 Encounters:   22 5' 8\" (1.727 m)   22 5' 8\" (1.727 m)   22 5' 7\" (1.702 m)       Diabetes Pharmacotherapy:  Humlaog insulin per Omnipod Dash pump    Glucose Trends:   Glucose at 0.5 hrs PPD today resulted at 220 mg/dl  Current monitoring regimen: Dexcom CGM - checks 4+ times daily  Home blood sugar trends:    -V. High: 22%, High: 34%, Target: 41%, Low: 2%, V. Low: <1%   -Frequent missed boluses  Any episodes of hypoglycemia? infrequently    Lifestyle Factors:   Previous visit with dietician: yes - 2021  Current diet: B: coffee or skips            L: leftovers OR salad OR Rigalis  salad                       D: 5:30p - grilled chicken, pork chops + veggie + potato                       Snacks:  At work: bag or pretzels, granola, periodic [place] candy bars, popcorn                       Beverages: (HS): cup of tea w/ little honey  Current exercise:  walking after dinner at least 3 days per week, some yard work    Health Maintenance:  Eye exam current (within one year): no Date: 2021, Dr. Andrea Matthews  Any history of foot problems? no  Foot exam up to date: yes Date: 22  The 10-year ASCVD risk score (Magnus DICKENS, et al., 2019) is: 1.4%  Last panel - LDL: Lab Results   Component Value Date    LDLCALC 92 07/07/2021   Taking ASA:  No   Taking statin: No- previously was taking rosuvastatin, but stopped 10 years ago; indicated for statin therapy  Last microalbumin/creatinine ratio: overdue; repeat already ordered  Microalbumin Creatinine Ratio   Date Value Ref Range Status   03/06/2019                    UNABLE TO CALCULATE <30 mg/g Final   Taking ACE/ARB: Yes- lisinopril    Focus:   Diabetes Education. Last A1C: 7.0% today, improved from 8.3% previously. CGM time in range is below goal at 41%. Offered upgrade to Omnipod 5; patient will consider. Aria reports frequent missed boluses lately; she feels she got out of her regular routine. She plans to get back on track with bolusing; we discussed bolus reminder techniques. Encouraged increase in physical activity. Encouraged scheduling of overdue eye exam.      Curriculum Area Focus: Retinal exams, Glucose checks/goals, Carbohydrate counting/meal planning, Physical activity goals, and Diabetes Technology  DSME PLAN:       Try to have something for breakfast - consider a yogurt drink  -This will help you make smarter work snacking choices    2. Try to make an effort to be more consistent with your boluses as you get back into your routine, especially with your supper boluses   -Try to use the missed bolus reminder for supper    3. Consider using the elliptical for 15 minutes 2 days a week in the morning    4. We will take a look at your download in 2 weeks to assess for changes. 5. Time to schedule your diabetes eye exam    6. Consider the COVID booster, Prevnar 20 vaccine, and flu vaccine at your local pharmacy     Goals Addressed    None          Meter download, medications, PMH and nursing assessment reviewed. Kourtney Younger states She is willing to participate in this plan of care and verbalized understanding of all instructions provided. Teach back used to verify comprehension.       Follow-up: 3 month DM Clinic RN    Total time involved in direct patient education: 60 minutes.      For Pharmacy Admin Tracking Only    Time Spent (min): [address], PharmD, [facility]  Internal Medicine Clinical Pharmacist  [phone]

## 2022-11-04 DIAGNOSIS — E10.65 UNCONTROLLED TYPE 1 DIABETES MELLITUS WITH HYPERGLYCEMIA (HCC): ICD-10-CM

## 2022-11-04 RX ORDER — BLOOD-GLUCOSE TRANSMITTER
EACH MISCELLANEOUS
Qty: 2 EACH | Refills: 3 | Status: SHIPPED | OUTPATIENT
Start: 2022-11-04

## 2022-11-15 RX ORDER — INSULIN PUMP CONTROLLER
EACH MISCELLANEOUS
Qty: 10 EACH | Refills: 5 | OUTPATIENT
Start: 2022-11-15

## 2022-11-16 RX ORDER — INSULIN PUMP CONTROLLER
1 EACH MISCELLANEOUS
Qty: 10 EACH | Refills: 5 | Status: SHIPPED | OUTPATIENT
Start: 2022-11-16

## 2022-12-02 ENCOUNTER — OFFICE VISIT (OUTPATIENT)
Dept: FAMILY MEDICINE CLINIC | Age: 47
End: 2022-12-02
Payer: COMMERCIAL

## 2022-12-02 ENCOUNTER — NURSE ONLY (OUTPATIENT)
Dept: LAB | Age: 47
End: 2022-12-02

## 2022-12-02 VITALS
HEART RATE: 72 BPM | WEIGHT: 228.5 LBS | DIASTOLIC BLOOD PRESSURE: 62 MMHG | BODY MASS INDEX: 34.74 KG/M2 | RESPIRATION RATE: 16 BRPM | SYSTOLIC BLOOD PRESSURE: 120 MMHG

## 2022-12-02 DIAGNOSIS — E66.9 OBESITY (BMI 30-39.9): ICD-10-CM

## 2022-12-02 DIAGNOSIS — E10.65 UNCONTROLLED TYPE 1 DIABETES MELLITUS WITH HYPERGLYCEMIA (HCC): ICD-10-CM

## 2022-12-02 DIAGNOSIS — E78.00 PURE HYPERCHOLESTEROLEMIA: ICD-10-CM

## 2022-12-02 DIAGNOSIS — E10.65 UNCONTROLLED TYPE 1 DIABETES MELLITUS WITH HYPERGLYCEMIA (HCC): Primary | ICD-10-CM

## 2022-12-02 DIAGNOSIS — E03.9 HYPOTHYROIDISM, UNSPECIFIED TYPE: ICD-10-CM

## 2022-12-02 DIAGNOSIS — Z51.81 MEDICATION MONITORING ENCOUNTER: ICD-10-CM

## 2022-12-02 PROBLEM — R07.9 CHEST PAIN: Status: ACTIVE | Noted: 2022-12-02

## 2022-12-02 PROBLEM — R09.81 CONGESTION OF PARANASAL SINUS: Status: ACTIVE | Noted: 2022-12-02

## 2022-12-02 LAB
ALBUMIN SERPL-MCNC: 4.1 G/DL (ref 3.5–5.1)
ALP BLD-CCNC: 116 U/L (ref 38–126)
ALT SERPL-CCNC: 9 U/L (ref 11–66)
ANION GAP SERPL CALCULATED.3IONS-SCNC: 14 MEQ/L (ref 8–16)
AST SERPL-CCNC: 12 U/L (ref 5–40)
AVERAGE GLUCOSE: 171 MG/DL (ref 70–126)
BASOPHILS # BLD: 0.7 %
BASOPHILS ABSOLUTE: 0.1 THOU/MM3 (ref 0–0.1)
BILIRUB SERPL-MCNC: 0.3 MG/DL (ref 0.3–1.2)
BUN BLDV-MCNC: 11 MG/DL (ref 7–22)
CALCIUM SERPL-MCNC: 9.6 MG/DL (ref 8.5–10.5)
CHLORIDE BLD-SCNC: 104 MEQ/L (ref 98–111)
CHOLESTEROL, TOTAL: 173 MG/DL (ref 100–199)
CO2: 26 MEQ/L (ref 23–33)
CREAT SERPL-MCNC: 0.7 MG/DL (ref 0.4–1.2)
CREATININE, URINE: 169.9 MG/DL
EOSINOPHIL # BLD: 1.4 %
EOSINOPHILS ABSOLUTE: 0.1 THOU/MM3 (ref 0–0.4)
ERYTHROCYTE [DISTWIDTH] IN BLOOD BY AUTOMATED COUNT: 13.6 % (ref 11.5–14.5)
ERYTHROCYTE [DISTWIDTH] IN BLOOD BY AUTOMATED COUNT: 46.2 FL (ref 35–45)
GFR SERPL CREATININE-BSD FRML MDRD: > 60 ML/MIN/1.73M2
GLUCOSE BLD-MCNC: 122 MG/DL (ref 70–108)
HBA1C MFR BLD: 7.7 % (ref 4.4–6.4)
HCT VFR BLD CALC: 42.3 % (ref 37–47)
HDLC SERPL-MCNC: 64 MG/DL
HEMOGLOBIN: 13.4 GM/DL (ref 12–16)
IMMATURE GRANS (ABS): 0.03 THOU/MM3 (ref 0–0.07)
IMMATURE GRANULOCYTES: 0.3 %
LDL CHOLESTEROL CALCULATED: 97 MG/DL
LYMPHOCYTES # BLD: 23.1 %
LYMPHOCYTES ABSOLUTE: 2.2 THOU/MM3 (ref 1–4.8)
MCH RBC QN AUTO: 29.4 PG (ref 26–33)
MCHC RBC AUTO-ENTMCNC: 31.7 GM/DL (ref 32.2–35.5)
MCV RBC AUTO: 92.8 FL (ref 81–99)
MICROALBUMIN UR-MCNC: 1.32 MG/DL
MICROALBUMIN/CREAT UR-RTO: 8 MG/G (ref 0–30)
MONOCYTES # BLD: 9.6 %
MONOCYTES ABSOLUTE: 0.9 THOU/MM3 (ref 0.4–1.3)
NUCLEATED RED BLOOD CELLS: 0 /100 WBC
PLATELET # BLD: 298 THOU/MM3 (ref 130–400)
PMV BLD AUTO: 12.2 FL (ref 9.4–12.4)
POTASSIUM SERPL-SCNC: 4.7 MEQ/L (ref 3.5–5.2)
RBC # BLD: 4.56 MILL/MM3 (ref 4.2–5.4)
SEG NEUTROPHILS: 64.9 %
SEGMENTED NEUTROPHILS ABSOLUTE COUNT: 6.2 THOU/MM3 (ref 1.8–7.7)
SODIUM BLD-SCNC: 144 MEQ/L (ref 135–145)
TOTAL PROTEIN: 7.3 G/DL (ref 6.1–8)
TRIGL SERPL-MCNC: 58 MG/DL (ref 0–199)
WBC # BLD: 9.6 THOU/MM3 (ref 4.8–10.8)

## 2022-12-02 PROCEDURE — 1036F TOBACCO NON-USER: CPT | Performed by: FAMILY MEDICINE

## 2022-12-02 PROCEDURE — 99214 OFFICE O/P EST MOD 30 MIN: CPT | Performed by: FAMILY MEDICINE

## 2022-12-02 PROCEDURE — 2022F DILAT RTA XM EVC RTNOPTHY: CPT | Performed by: FAMILY MEDICINE

## 2022-12-02 PROCEDURE — G8417 CALC BMI ABV UP PARAM F/U: HCPCS | Performed by: FAMILY MEDICINE

## 2022-12-02 PROCEDURE — G8484 FLU IMMUNIZE NO ADMIN: HCPCS | Performed by: FAMILY MEDICINE

## 2022-12-02 PROCEDURE — 3051F HG A1C>EQUAL 7.0%<8.0%: CPT | Performed by: FAMILY MEDICINE

## 2022-12-02 PROCEDURE — G8427 DOCREV CUR MEDS BY ELIG CLIN: HCPCS | Performed by: FAMILY MEDICINE

## 2022-12-02 RX ORDER — INSULIN LISPRO 100 [IU]/ML
INJECTION, SOLUTION INTRAVENOUS; SUBCUTANEOUS
Qty: 30 ML | Refills: 5 | Status: SHIPPED | OUTPATIENT
Start: 2022-12-02

## 2022-12-02 ASSESSMENT — ENCOUNTER SYMPTOMS
RESPIRATORY NEGATIVE: 1
GASTROINTESTINAL NEGATIVE: 1

## 2023-02-13 ENCOUNTER — HOSPITAL ENCOUNTER (EMERGENCY)
Age: 48
Discharge: HOME | End: 2023-02-13
Payer: COMMERCIAL

## 2023-02-13 VITALS
SYSTOLIC BLOOD PRESSURE: 137 MMHG | TEMPERATURE: 98.3 F | HEIGHT: 67 IN | DIASTOLIC BLOOD PRESSURE: 62 MMHG | RESPIRATION RATE: 18 BRPM | HEART RATE: 74 BPM | OXYGEN SATURATION: 95 % | WEIGHT: 225 LBS | BODY MASS INDEX: 35.31 KG/M2

## 2023-02-13 DIAGNOSIS — H66.91 RIGHT OTITIS MEDIA, UNSPECIFIED OTITIS MEDIA TYPE: Primary | ICD-10-CM

## 2023-02-13 DIAGNOSIS — J20.9 ACUTE BRONCHITIS, UNSPECIFIED ORGANISM: ICD-10-CM

## 2023-02-13 LAB — S PYO AG THROAT QL: NEGATIVE

## 2023-02-13 PROCEDURE — 99213 OFFICE O/P EST LOW 20 MIN: CPT | Performed by: NURSE PRACTITIONER

## 2023-02-13 PROCEDURE — 87651 STREP A DNA AMP PROBE: CPT

## 2023-02-13 PROCEDURE — 99213 OFFICE O/P EST LOW 20 MIN: CPT

## 2023-02-13 RX ORDER — DEXTROMETHORPHAN HYDROBROMIDE AND PROMETHAZINE HYDROCHLORIDE 15; 6.25 MG/5ML; MG/5ML
5 SYRUP ORAL 4 TIMES DAILY PRN
Qty: 100 ML | Refills: 0 | Status: SHIPPED | OUTPATIENT
Start: 2023-02-13 | End: 2023-02-18

## 2023-02-13 RX ORDER — AZITHROMYCIN 250 MG/1
TABLET, FILM COATED ORAL
Qty: 1 PACKET | Refills: 0 | Status: SHIPPED | OUTPATIENT
Start: 2023-02-13 | End: 2023-02-17

## 2023-02-13 RX ORDER — PREDNISONE 20 MG/1
20 TABLET ORAL DAILY
Qty: 7 TABLET | Refills: 0 | Status: SHIPPED | OUTPATIENT
Start: 2023-02-13 | End: 2023-02-20

## 2023-02-13 ASSESSMENT — ENCOUNTER SYMPTOMS
WHEEZING: 0
APNEA: 0
SINUS PRESSURE: 1
COUGH: 1
RHINORRHEA: 1
SORE THROAT: 1
CHOKING: 0
VOICE CHANGE: 1
SWOLLEN GLANDS: 0
CHEST TIGHTNESS: 0
STRIDOR: 0
SINUS PAIN: 1
SHORTNESS OF BREATH: 0

## 2023-02-13 ASSESSMENT — PAIN - FUNCTIONAL ASSESSMENT: PAIN_FUNCTIONAL_ASSESSMENT: NONE - DENIES PAIN

## 2023-02-14 NOTE — ED PROVIDER NOTES
[place] [address]  Urgent Care Encounter      CHIEF COMPLAINT       Chief Complaint   Patient presents with    Cough    Pharyngitis    Otalgia     right       Nurses Notes reviewed and I agree except as noted in the HPI. HISTORY OFPRESENT ILLNESS   Aria Penn is a 50 y.o. The history is provided by the patient. No  was used. URI  Presenting symptoms: congestion, cough, fatigue, rhinorrhea and sore throat    Presenting symptoms: no ear pain, no facial pain and no fever    Severity:  Moderate  Onset quality:  Gradual  Duration:  1 week  Timing:  Constant  Progression:  Worsening  Chronicity:  New  Relieved by:  Nothing  Worsened by:  Certain positions  Ineffective treatments:  OTC medications  Associated symptoms: headaches and sinus pain    Associated symptoms: no arthralgias, no myalgias, no neck pain, no sneezing, no swollen glands and no wheezing    Risk factors: not elderly, no chronic cardiac disease, no chronic kidney disease, no chronic respiratory disease, no diabetes mellitus, no immunosuppression, no recent illness, no recent travel and no sick contacts      REVIEW OF SYSTEMS     Review of Systems   Constitutional:  Positive for fatigue. Negative for activity change, appetite change, chills, diaphoresis and fever. HENT:  Positive for congestion, postnasal drip, rhinorrhea, sinus pressure, sinus pain, sore throat and voice change. Negative for ear pain and sneezing. Respiratory:  Positive for cough. Negative for apnea, choking, chest tightness, shortness of breath, wheezing and stridor. Cardiovascular:  Negative for chest pain, palpitations and leg swelling. Musculoskeletal:  Negative for arthralgias, myalgias and neck pain. Neurological:  Positive for headaches.      PAST MEDICAL HISTORY         Diagnosis Date    Diabetes mellitus type 1, uncontrolled     dx 18 years ago    Hypothyroidism        SURGICAL HISTORY     Patient  has a past surgical history that includes Cholecystectomy (2010);  section (, ); Tubal ligation (); Endometrial ablation (); Partial hysterectomy (4/14/15); Hysterectomy; and shoulder surgery (Left, 2017). CURRENT MEDICATIONS       Previous Medications    CONTINUOUS BLOOD GLUC  (DEXCOM G6 ) ALONZO    As directed. Dx: IDDM    CONTINUOUS BLOOD GLUC SENSOR (DEXCOM G6 SENSOR) MISC    USE AS DIRECTED    CONTINUOUS BLOOD GLUC TRANSMIT (DEXCOM G6 TRANSMITTER) MISC    USE AS DIRECTED    HUMALOG 100 UNIT/ML SOLN INJECTION VIAL    USE AS DIRECTED PER PUMP SETTING. MAX   UNITS DAILY    INSULIN DISPOSABLE PUMP (OMNIPOD DASH PDM, GEN 4,) KIT    1 each by Does not apply route once for 1 dose    INSULIN DISPOSABLE PUMP (OMNIPOD DASH PODS, GEN 4,) MISC    1 each by Does not apply route every 3 days    LISINOPRIL (ZESTRIL) 2.5 MG TABLET    Take 1 tablet by mouth daily    LORATADINE-PSEUDOEPHEDRINE (CLARITIN-D 24 HOUR PO)    Take by mouth    SYNTHROID 200 MCG TABLET    Take 1 tablet by mouth daily Take with water on an empty stomach- wait 30 minutes before eating or taking other meds. ALLERGIES     Patient is is allergic to latex, ciprofloxacin, doxycycline, penicillins, influenza vac typ a&b surf ant, iodine, and morphine. FAMILY HISTORY     Patient's family history includes Cancer in her father and mother; High Blood Pressure in her mother. SOCIAL HISTORY     Patient  reports that she has never smoked. She has never been exposed to tobacco smoke. She has never used smokeless tobacco. She reports that she does not currently use alcohol. She reports that she does not use drugs. PHYSICAL EXAM     ED TRIAGE VITALS  BP: 137/62, Temp: 98.3 °F (36.8 °C), Heart Rate: 74, Resp: 18, SpO2: 95 %  Physical Exam  Vitals and nursing note reviewed. Constitutional:       General: She is not in acute distress. Appearance: She is well-developed.  She is not ill-appearing, toxic-appearing or diaphoretic. HENT:      Head: Normocephalic and atraumatic. Right Ear: No drainage, swelling or tenderness. A middle ear effusion is present. Tympanic membrane is not erythematous. Left Ear: Tympanic membrane and ear canal normal. No drainage, swelling or tenderness. No middle ear effusion. Tympanic membrane is not erythematous. Nose: Congestion and rhinorrhea present. Mouth/Throat:      Mouth: Mucous membranes are moist. No oral lesions. Pharynx: Uvula midline. No pharyngeal swelling, oropharyngeal exudate, posterior oropharyngeal erythema or uvula swelling. Eyes:      Conjunctiva/sclera: Conjunctivae normal.   Pulmonary:      Effort: Pulmonary effort is normal.      Breath sounds: Rhonchi present. Musculoskeletal:      Cervical back: Normal range of motion and neck supple. Skin:     General: Skin is warm and dry. Neurological:      General: No focal deficit present. Mental Status: She is alert. Psychiatric:         Mood and Affect: Mood normal.         Behavior: Behavior normal.       DIAGNOSTIC RESULTS   Labs:No results found for this visit on 02/13/23. IMAGING:  No orders to display     URGENT CARE COURSE:     Vitals:    02/13/23 1916   BP: 137/62   Pulse: 74   Resp: 18   Temp: 98.3 °F (36.8 °C)   TempSrc: Temporal   SpO2: 95%   Weight: 225 lb (102.1 kg)   Height: 5' 7\" (1.702 m)       Medications - No data to display  PROCEDURES:  None  FINAL IMPRESSION      1. Right otitis media, unspecified otitis media type    2. Acute bronchitis, unspecified organism        DISPOSITION/PLAN   Decision To Discharge       The parent or patient representative was advised that at this point the patient can be treated safely at home, the parent or Patient representative should be aware of following interventions and  advised to the watch for the following:  #1. Any increasing pain not controlled with Motrin or Tylenol. #2.   Any development of drainage from the ears redness of the auricle or posterior ear. #3.  Her development of the any fever chills, headache or stiffness of the neck the patient needs to be reevaluated by the primary care provider, return here or go to the emergency department for reevaluation. The patient or patient's representative are agreeable to the outpatient management at this time. They are advised to follow-up with her primary care provider in 2-3 days for reevaluation. The patient left ambulatory without any problems. PATIENT REFERRED TO:  Simon Rose 1, [address]  [phone]    Schedule an appointment as soon as possible for a visit     DISCHARGE MEDICATIONS:  New Prescriptions    AZITHROMYCIN (ZITHROMAX Z-BENTLEY) 250 MG TABLET    Take 2 tablets (500 mg) on Day 1, and then take 1 tablet (250 mg) on days 2 through 5.     PREDNISONE (DELTASONE) 20 MG TABLET    Take 1 tablet by mouth daily for 7 days    PROMETHAZINE-DEXTROMETHORPHAN (PROMETHAZINE-DM) 6.25-15 MG/5ML SYRUP    Take 5 mLs by mouth 4 times daily as needed for Cough     Current Discharge Medication List          TIMOTHY Bean - CNP          TIMOTHY Bean - [facility]  02/13/23 1934

## 2023-02-14 NOTE — ED TRIAGE NOTES
Pt to [facility] ambulatory with a cough, sore throat, and right ear pain. This started 1 week ago.

## 2023-03-24 ENCOUNTER — HOSPITAL ENCOUNTER (EMERGENCY)
Age: 48
Discharge: HOME | End: 2023-03-24
Payer: COMMERCIAL

## 2023-03-24 VITALS
SYSTOLIC BLOOD PRESSURE: 125 MMHG | TEMPERATURE: 98.5 F | BODY MASS INDEX: 33.04 KG/M2 | OXYGEN SATURATION: 97 % | RESPIRATION RATE: 18 BRPM | WEIGHT: 218 LBS | DIASTOLIC BLOOD PRESSURE: 60 MMHG | HEIGHT: 68 IN | HEART RATE: 76 BPM

## 2023-03-24 DIAGNOSIS — J01.00 ACUTE NON-RECURRENT MAXILLARY SINUSITIS: Primary | ICD-10-CM

## 2023-03-24 LAB
S PYO AG THROAT QL: NEGATIVE
SARS-COV-2 RDRP RESP QL NAA+PROBE: NOT  DETECTED

## 2023-03-24 PROCEDURE — 87651 STREP A DNA AMP PROBE: CPT

## 2023-03-24 PROCEDURE — 87635 SARS-COV-2 COVID-19 AMP PRB: CPT

## 2023-03-24 PROCEDURE — 99213 OFFICE O/P EST LOW 20 MIN: CPT

## 2023-03-24 PROCEDURE — 99214 OFFICE O/P EST MOD 30 MIN: CPT | Performed by: NURSE PRACTITIONER

## 2023-03-24 RX ORDER — CEFDINIR 300 MG/1
300 CAPSULE ORAL 2 TIMES DAILY
Qty: 14 CAPSULE | Refills: 0 | Status: SHIPPED | OUTPATIENT
Start: 2023-03-24 | End: 2023-03-31

## 2023-03-24 RX ORDER — IBUPROFEN 800 MG/1
800 TABLET ORAL EVERY 8 HOURS PRN
Qty: 30 TABLET | Refills: 0 | Status: SHIPPED | OUTPATIENT
Start: 2023-03-24 | End: 2023-04-03

## 2023-03-24 ASSESSMENT — ENCOUNTER SYMPTOMS
VOMITING: 0
SHORTNESS OF BREATH: 0
NAUSEA: 0
RHINORRHEA: 0
SINUS PRESSURE: 1
TROUBLE SWALLOWING: 0
SORE THROAT: 1
EYE DISCHARGE: 0
DIARRHEA: 0
SINUS PAIN: 1
EYE REDNESS: 0
COUGH: 0

## 2023-03-24 ASSESSMENT — PAIN - FUNCTIONAL ASSESSMENT: PAIN_FUNCTIONAL_ASSESSMENT: NONE - DENIES PAIN

## 2023-03-24 NOTE — ED TRIAGE NOTES
Pt to [facility] ambulatory with a sore throat, facial pressure, and bilateral ear pain. This started on Wednesday.

## 2023-03-24 NOTE — ED PROVIDER NOTES
[place]  [facility]gent [facility] Encounter      CHIEF COMPLAINT       Chief Complaint   Patient presents with    Pharyngitis    Facial Pain     pressure    Otalgia     Bilateral       Nurses Notes reviewed and I agree except as noted in the HPI. HISTORY OF PRESENT ILLNESS   Aria Hawk is a 46 y.o. female who presents with a 5-day history of headaches, sore throat, ear pain, and sinus pressure. Patient states everyone in her household is sick and she is progressively getting worse. Rating her pain presently 7/10. She has been taking over-the-counter cough and cold medication with no improvement in symptoms. Patient denies fever, nausea, vomiting, diarrhea, chest pain, shortness of breath, or other unusual symptoms at this time. REVIEW OF SYSTEMS     Review of Systems   Constitutional:  Negative for chills, diaphoresis, fatigue and fever. HENT:  Positive for ear pain, sinus pressure, sinus pain and sore throat. Negative for congestion, rhinorrhea and trouble swallowing. Eyes:  Negative for discharge and redness. Respiratory:  Negative for cough and shortness of breath. Cardiovascular:  Negative for chest pain. Gastrointestinal:  Negative for diarrhea, nausea and vomiting. Genitourinary:  Negative for decreased urine volume. Musculoskeletal:  Negative for neck pain and neck stiffness. Skin:  Negative for rash. Neurological:  Positive for headaches. Hematological:  Negative for adenopathy. Psychiatric/Behavioral:  Negative for sleep disturbance. PAST MEDICAL HISTORY         Diagnosis Date    Diabetes mellitus type 1, uncontrolled     dx 18 years ago    Hypothyroidism        SURGICAL HISTORY     Patient  has a past surgical history that includes Cholecystectomy (2010);  section (, ); Tubal ligation (); Endometrial ablation (); Partial hysterectomy (4/14/15); Hysterectomy; and shoulder surgery (Left, 2017).     CURRENT MEDICATIONS in symptoms. Discharged home in good condition.   PATIENT REFERRED TO:  Simon Linton 1, [address]  [phone]    In 1 week  If symptoms worsen  DISCHARGE MEDICATIONS:  New Prescriptions    CEFDINIR (OMNICEF) 300 MG CAPSULE    Take 1 capsule by mouth 2 times daily for 7 days    IBUPROFEN (ADVIL;MOTRIN) 800 MG TABLET    Take 1 tablet by mouth every 8 hours as needed for Pain or Fever     Current Discharge Medication List          Jensen Banks APRN - CNP  03/24/23 [id number]

## 2023-03-24 NOTE — Clinical Note
Leta Fall was seen and treated in our emergency department on 3/24/2023. She may return to work on 03/27/2023. If you have any questions or concerns, please don't hesitate to call.       Tashi Valencia, APRN - CNP

## 2023-03-27 ENCOUNTER — PATIENT MESSAGE (OUTPATIENT)
Dept: FAMILY MEDICINE CLINIC | Age: 48
End: 2023-03-27

## 2023-03-29 ENCOUNTER — PATIENT MESSAGE (OUTPATIENT)
Dept: FAMILY MEDICINE CLINIC | Age: 48
End: 2023-03-29

## 2023-03-30 RX ORDER — FLUCONAZOLE 150 MG/1
150 TABLET ORAL ONCE
Qty: 2 TABLET | Refills: 0 | Status: SHIPPED | OUTPATIENT
Start: 2023-03-30 | End: 2023-03-30

## 2023-03-30 NOTE — TELEPHONE ENCOUNTER
Fina Hoyos LPN 8/04/0078 8:95 AM EDT      ----- Message -----  From: Janiya Ackerman  Sent: 3/29/2023 9:45 PM EDT  To: Sheryle Riding & Tyrone Boyle Clinical Staff  Subject: Prescrption Question     I recently went to Urgent Care on 3/24/23. I was diagnosed with sinusitis. I was given Cefdinir 300 mg and take it 2 times a day for 7 days. I started taking it Saturday. On Tuesday I started feeling like I have the start of a yeast infection and quit the antibiotic. Is it possible to also get something to help with the yeast infection? Or should I make an appt. and come in? I just want to get rid of this sinus problem .      Thank you!!    Segundo Becker

## 2023-04-20 ENCOUNTER — OFFICE VISIT (OUTPATIENT)
Dept: INTERNAL MEDICINE CLINIC | Age: 48
End: 2023-04-20
Payer: COMMERCIAL

## 2023-04-20 VITALS
BODY MASS INDEX: 35.77 KG/M2 | HEART RATE: 75 BPM | DIASTOLIC BLOOD PRESSURE: 60 MMHG | WEIGHT: 236 LBS | SYSTOLIC BLOOD PRESSURE: 124 MMHG | HEIGHT: 68 IN

## 2023-04-20 DIAGNOSIS — E10.65 UNCONTROLLED TYPE 1 DIABETES MELLITUS WITH HYPERGLYCEMIA (HCC): Primary | ICD-10-CM

## 2023-04-20 LAB — HBA1C MFR BLD: 8 % (ref 4.3–5.7)

## 2023-04-20 PROCEDURE — G0108 DIAB MANAGE TRN  PER INDIV: HCPCS | Performed by: REGISTERED NURSE

## 2023-04-20 PROCEDURE — 83037 HB GLYCOSYLATED A1C HOME DEV: CPT | Performed by: REGISTERED NURSE

## 2023-04-20 PROCEDURE — NBSRV NON-BILLABLE SERVICE: Performed by: REGISTERED NURSE

## 2023-05-15 RX ORDER — INSULIN PUMP CONTROLLER
EACH MISCELLANEOUS
Qty: 10 EACH | Refills: 5 | Status: SHIPPED | OUTPATIENT
Start: 2023-05-15

## 2023-06-01 ENCOUNTER — OFFICE VISIT (OUTPATIENT)
Dept: FAMILY MEDICINE CLINIC | Age: 48
End: 2023-06-01
Payer: COMMERCIAL

## 2023-06-01 VITALS
BODY MASS INDEX: 35.38 KG/M2 | HEART RATE: 72 BPM | DIASTOLIC BLOOD PRESSURE: 68 MMHG | RESPIRATION RATE: 16 BRPM | SYSTOLIC BLOOD PRESSURE: 122 MMHG | WEIGHT: 232.7 LBS

## 2023-06-01 DIAGNOSIS — E66.01 SEVERE OBESITY (BMI 35.0-39.9) WITH COMORBIDITY (HCC): ICD-10-CM

## 2023-06-01 DIAGNOSIS — R80.9 MICROALBUMINURIA: ICD-10-CM

## 2023-06-01 DIAGNOSIS — E10.65 UNCONTROLLED TYPE 1 DIABETES MELLITUS WITH HYPERGLYCEMIA (HCC): Primary | ICD-10-CM

## 2023-06-01 DIAGNOSIS — E66.9 OBESITY (BMI 30-39.9): ICD-10-CM

## 2023-06-01 DIAGNOSIS — E03.9 HYPOTHYROIDISM, UNSPECIFIED TYPE: ICD-10-CM

## 2023-06-01 DIAGNOSIS — Z51.81 MEDICATION MONITORING ENCOUNTER: ICD-10-CM

## 2023-06-01 DIAGNOSIS — E78.00 PURE HYPERCHOLESTEROLEMIA: ICD-10-CM

## 2023-06-01 DIAGNOSIS — Z12.31 ENCOUNTER FOR SCREENING MAMMOGRAM FOR MALIGNANT NEOPLASM OF BREAST: ICD-10-CM

## 2023-06-01 PROCEDURE — 3017F COLORECTAL CA SCREEN DOC REV: CPT | Performed by: FAMILY MEDICINE

## 2023-06-01 PROCEDURE — G8417 CALC BMI ABV UP PARAM F/U: HCPCS | Performed by: FAMILY MEDICINE

## 2023-06-01 PROCEDURE — 3052F HG A1C>EQUAL 8.0%<EQUAL 9.0%: CPT | Performed by: FAMILY MEDICINE

## 2023-06-01 PROCEDURE — 1036F TOBACCO NON-USER: CPT | Performed by: FAMILY MEDICINE

## 2023-06-01 PROCEDURE — 2022F DILAT RTA XM EVC RTNOPTHY: CPT | Performed by: FAMILY MEDICINE

## 2023-06-01 PROCEDURE — 99214 OFFICE O/P EST MOD 30 MIN: CPT | Performed by: FAMILY MEDICINE

## 2023-06-01 PROCEDURE — G8427 DOCREV CUR MEDS BY ELIG CLIN: HCPCS | Performed by: FAMILY MEDICINE

## 2023-06-01 RX ORDER — INSULIN LISPRO 100 [IU]/ML
INJECTION, SOLUTION INTRAVENOUS; SUBCUTANEOUS
Qty: 30 ML | Refills: 5 | Status: SHIPPED | OUTPATIENT
Start: 2023-06-01

## 2023-06-01 RX ORDER — LISINOPRIL 2.5 MG/1
2.5 TABLET ORAL DAILY
Qty: 90 TABLET | Refills: 3 | Status: SHIPPED | OUTPATIENT
Start: 2023-06-01

## 2023-06-01 RX ORDER — LEVOTHYROXINE SODIUM 200 MCG
200 TABLET ORAL DAILY
Qty: 90 TABLET | Refills: 3 | Status: SHIPPED | OUTPATIENT
Start: 2023-06-01

## 2023-06-01 SDOH — ECONOMIC STABILITY: INCOME INSECURITY: HOW HARD IS IT FOR YOU TO PAY FOR THE VERY BASICS LIKE FOOD, HOUSING, MEDICAL CARE, AND HEATING?: NOT HARD AT ALL

## 2023-06-01 SDOH — ECONOMIC STABILITY: FOOD INSECURITY: WITHIN THE PAST 12 MONTHS, THE FOOD YOU BOUGHT JUST DIDN'T LAST AND YOU DIDN'T HAVE MONEY TO GET MORE.: NEVER TRUE

## 2023-06-01 SDOH — ECONOMIC STABILITY: FOOD INSECURITY: WITHIN THE PAST 12 MONTHS, YOU WORRIED THAT YOUR FOOD WOULD RUN OUT BEFORE YOU GOT MONEY TO BUY MORE.: NEVER TRUE

## 2023-06-01 SDOH — ECONOMIC STABILITY: HOUSING INSECURITY
IN THE LAST 12 MONTHS, WAS THERE A TIME WHEN YOU DID NOT HAVE A STEADY PLACE TO SLEEP OR SLEPT IN A SHELTER (INCLUDING NOW)?: NO

## 2023-06-01 ASSESSMENT — PATIENT HEALTH QUESTIONNAIRE - PHQ9
SUM OF ALL RESPONSES TO PHQ QUESTIONS 1-9: 0
SUM OF ALL RESPONSES TO PHQ9 QUESTIONS 1 & 2: 0
2. FEELING DOWN, DEPRESSED OR HOPELESS: 0
SUM OF ALL RESPONSES TO PHQ QUESTIONS 1-9: 0
SUM OF ALL RESPONSES TO PHQ QUESTIONS 1-9: 0
1. LITTLE INTEREST OR PLEASURE IN DOING THINGS: 0
SUM OF ALL RESPONSES TO PHQ QUESTIONS 1-9: 0

## 2023-06-01 ASSESSMENT — ENCOUNTER SYMPTOMS
RESPIRATORY NEGATIVE: 1
GASTROINTESTINAL NEGATIVE: 1

## 2023-06-01 NOTE — PROGRESS NOTES
alert.   Psychiatric:         Attention and Perception: Attention normal.         Mood and Affect: Mood normal.         Speech: Speech normal.         Behavior: Behavior normal. Behavior is cooperative. Thought Content: Thought content normal.         Judgment: Judgment normal.             ASSESSMENT/PLAN:  1. Uncontrolled type 1 diabetes mellitus with hyperglycemia (HCC)  -     HUMALOG 100 UNIT/ML SOLN injection vial; USE AS DIRECTED PER PUMP SETTING. MAX   UNITS DAILY, Disp-30 mL, R-5, DAWNormal  -     Comprehensive Metabolic Panel; Future  -     Hemoglobin A1C; Future  2. Microalbuminuria  -     lisinopril (ZESTRIL) 2.5 MG tablet; Take 1 tablet by mouth daily, Disp-90 tablet, R-3Normal  -     Microalbumin / Creatinine Urine Ratio; Future  3. Hypothyroidism, unspecified type  -     SYNTHROID 200 MCG tablet; Take 1 tablet by mouth daily Take with water on an empty stomach- wait 30 minutes before eating or taking other meds. , Disp-90 tablet, R-3, DAWNormal  -     TSH with Reflex; Future  4. Obesity (BMI 30-39.9)  5. Pure hypercholesterolemia  -     Lipid Panel w/ Reflex Direct LDL; Future  6. Medication monitoring encounter  -     CBC with Auto Differential; Future  7. Severe obesity (BMI 35.0-39. 9) with comorbidity ([facility] [address]  8. Encounter for screening mammogram for malignant neoplasm of breast  -     MARY DIGITAL SCREEN W OR WO CAD BILATERAL; Future    -  Chronic medical problems stable  -  Continue current medications  -  Follow up with specialists as scheduled  -  Order for mammogram given  -  Check labs 6 mos    Return in about 6 months (around 12/1/2023) for DM2. An electronic signature was used to authenticate this note.     --Genie Rosado DO

## 2023-08-03 ENCOUNTER — HOSPITAL ENCOUNTER (OUTPATIENT)
Dept: WOMENS IMAGING | Age: 48
Discharge: HOME | End: 2023-08-03
Attending: FAMILY MEDICINE
Payer: COMMERCIAL

## 2023-08-03 DIAGNOSIS — Z12.31 ENCOUNTER FOR SCREENING MAMMOGRAM FOR MALIGNANT NEOPLASM OF BREAST: ICD-10-CM

## 2023-08-03 PROCEDURE — 77063 BREAST TOMOSYNTHESIS BI: CPT

## 2023-08-17 ENCOUNTER — OFFICE VISIT (OUTPATIENT)
Dept: INTERNAL MEDICINE CLINIC | Age: 48
End: 2023-08-17

## 2023-08-17 ENCOUNTER — TELEPHONE (OUTPATIENT)
Dept: INTERNAL MEDICINE CLINIC | Age: 48
End: 2023-08-17

## 2023-08-17 VITALS
WEIGHT: 236.6 LBS | HEIGHT: 68 IN | TEMPERATURE: 98.7 F | SYSTOLIC BLOOD PRESSURE: 126 MMHG | BODY MASS INDEX: 35.86 KG/M2 | DIASTOLIC BLOOD PRESSURE: 68 MMHG | HEART RATE: 78 BPM

## 2023-08-17 DIAGNOSIS — E10.65 UNCONTROLLED TYPE 1 DIABETES MELLITUS WITH HYPERGLYCEMIA (HCC): Primary | ICD-10-CM

## 2023-08-17 LAB — HBA1C MFR BLD: 8.4 % (ref 4.3–5.7)

## 2023-08-17 PROCEDURE — NBSRV NON-BILLABLE SERVICE: Performed by: REGISTERED NURSE

## 2023-08-17 RX ORDER — INSULIN PMP CART,AUT,G6/7,CNTR
1 EACH SUBCUTANEOUS ONCE
Qty: 1 KIT | Refills: 0 | Status: SHIPPED | OUTPATIENT
Start: 2023-08-17 | End: 2023-08-17

## 2023-08-17 RX ORDER — INSULIN PMP CART,AUT,G6/7,CNTR
1 EACH SUBCUTANEOUS
Qty: 10 EACH | Refills: 5 | Status: SHIPPED | OUTPATIENT
Start: 2023-08-17

## 2023-08-17 NOTE — TELEPHONE ENCOUNTER
Crystal would benefit from upgrading to the OmniPod 5 with automated mode. Please consider pended scripts for the OmniPod 5. Thank you.

## 2023-08-17 NOTE — PROGRESS NOTES
[facility]  [facility]. [address] [facility], [address] [place] [address]  [phone] (phone)  [phone] (fax)    Patient ID: Solomon Dumont 1975  Referring Provider: Dr. Pedro Bound     Patient's name and  were verified. Subjective:    She presents for a follow-up diabetic visit. She has type 1 diabetes mellitus. Home regimen includes: Insulin She is compliant some of the time. Assessment:     Lab Results   Component Value Date/Time    LABA1C 8.4 2023 07:50 AM    LABA1C 7.7 2022 07:11 AM    BUN 11 2022 07:11 AM    CREATININE 0.7 2022 07:11 AM     Wt Readings from Last 3 Encounters:   23 236 lb 9.6 oz (107.3 kg)   23 232 lb 11.2 oz (105.6 kg)   23 236 lb (107 kg)     Ht Readings from Last 3 Encounters:   23 5' 8\" (1.727 m)   23 5' 8\" (1.727 m)   23 5' 8\" (1.727 m)       Diabetes Pharmacotherapy:  Humalog insulin per Omnipod Dash pump    Glucose Trends:   Glucose at FBS today resulted at 104mg/dl  Current monitoring regimen: Dexcom CGM - checks 4+ times daily  Home blood sugar trends:    -V. High: 41%, High: 23%, Target: 34%, Low: 1%, V. Low: 1%   -Average Glucose: 231mg/dL; GMI: 8.8%;  %time CGM active 97%              -overall, blood sugars are elevated; often the result of not bolusing for meals or snacks. Any episodes of hypoglycemia?  yes - less than once weekly   -Treats with glucose tablets    Lifestyle Factors:   Previous visit with dietician: yes - 6/3/2021  Current diet: B: skips 50% - banana            L: buffalo chicken pasta                       D: chicken, green beans, smashed potoat                       Snacks:avoids evening snacking                                      Random snacking at work-2-3 pretzels                       Beverages:cup of tea bedtime; coffee; water;                               1 diet soda per day  Current exercise: Eliptical 3 times per week or walk 10-15 minute    Health Maintenance:  Eye exam current (within one

## 2023-08-17 NOTE — PATIENT INSTRUCTIONS
You need to bolus for all of  your meals and snacks        --unless you are under 120 and active and you are trying to                     Prevent a low blood sugar     -ask your  about helping remind you to bolus at dinner     -put a sticky note inside the door of your plate cupboard     -make that connection: food in mouth = bolus first!     -you can bolus up to 20 minutes before you eat--as you are finishing                Your meal prep    -ask your  to check your blood sugar when he goes to bed               (If your sleeping on the couch)               *if blood sugar is over 200 = bolus    -set upper limit alarm at 220 to warn you to correct your blood sugar  We will requst the OmniPod 5

## 2023-09-06 DIAGNOSIS — E10.65 UNCONTROLLED TYPE 1 DIABETES MELLITUS WITH HYPERGLYCEMIA (HCC): ICD-10-CM

## 2023-09-06 RX ORDER — PROCHLORPERAZINE 25 MG/1
SUPPOSITORY RECTAL
Qty: 9 EACH | Refills: 3 | Status: SHIPPED | OUTPATIENT
Start: 2023-09-06

## 2023-09-13 ENCOUNTER — TELEPHONE (OUTPATIENT)
Dept: INTERNAL MEDICINE CLINIC | Age: 48
End: 2023-09-13

## 2023-09-13 NOTE — TELEPHONE ENCOUNTER
Patient had sent a [employer] message stating that she had received her OmniPod 5 pump and asked if she needed to do anything before getting trained. I set a message back but have not gotten a response. I called patient and left a message letting her know that I sent her a message back letting her know the steps that need to be completed before being trained and that we had a opening for her on 9/14/2023 at 10am.  I asked for a return call.

## 2023-09-14 ENCOUNTER — TELEPHONE (OUTPATIENT)
Dept: INTERNAL MEDICINE CLINIC | Age: 48
End: 2023-09-14

## 2023-09-14 NOTE — TELEPHONE ENCOUNTER
Called patient and left a message letting her know that I had sent a "[employer] message for the steps of how to register her OmniPod 5 Insulin pump. I also let her know we scheduled her OmniPod training for 9/21/2023 at 10am and to let me know if that will work.

## 2023-09-21 ENCOUNTER — OFFICE VISIT (OUTPATIENT)
Dept: INTERNAL MEDICINE CLINIC | Age: 48
End: 2023-09-21

## 2023-09-21 VITALS
SYSTOLIC BLOOD PRESSURE: 128 MMHG | DIASTOLIC BLOOD PRESSURE: 70 MMHG | BODY MASS INDEX: 36.16 KG/M2 | HEIGHT: 68 IN | TEMPERATURE: 98.6 F | HEART RATE: 73 BPM | WEIGHT: 238.6 LBS

## 2023-09-21 DIAGNOSIS — E10.9 TYPE 1 DIABETES MELLITUS WITHOUT COMPLICATION (HCC): Primary | ICD-10-CM

## 2023-09-21 NOTE — PATIENT INSTRUCTIONS
Pay attention that you are staying in automated mode (the bubble Lumbee)         If in manual mode --switch modes --back to automated mode  Bolus for all food you are eating unless you are treating a low blood            Sugar       *bolus before you eat!   Always carry treatment for low blood sugars

## 2023-09-21 NOTE — PROGRESS NOTES
plan of care and verbalized understanding of all instructions provided. Teach back used to verify comprehension. Follow-up: 2 weeks    Total time involved in direct patient education: 60 minutes.    Individual Education appointment justified due to: Need for Additional Insulin Training

## 2023-09-27 ENCOUNTER — TELEPHONE (OUTPATIENT)
Dept: INTERNAL MEDICINE CLINIC | Age: 48
End: 2023-09-27

## 2023-09-27 NOTE — TELEPHONE ENCOUNTER
OmniPod 5/ Dexcom download with automated mode  Time in Range:  Very High 6%, High 16% In Target Range 73%, Low 4%, Very Low 1%  Average glucose 148mg/dL. GMI --%. Sensor usage  83%  Prior to automated mode: In target 12%/ above target 88%! Continue with current pump setting at this time. Follow up 1 week        .

## 2023-10-03 ENCOUNTER — OFFICE VISIT (OUTPATIENT)
Dept: INTERNAL MEDICINE CLINIC | Age: 48
End: 2023-10-03

## 2023-10-03 VITALS
TEMPERATURE: 98.5 F | SYSTOLIC BLOOD PRESSURE: 130 MMHG | DIASTOLIC BLOOD PRESSURE: 68 MMHG | WEIGHT: 238.4 LBS | HEIGHT: 68 IN | HEART RATE: 86 BPM | BODY MASS INDEX: 36.13 KG/M2

## 2023-10-03 DIAGNOSIS — E10.9 TYPE 1 DIABETES MELLITUS WITHOUT COMPLICATION (HCC): Primary | ICD-10-CM

## 2023-10-03 PROCEDURE — NBSRV NON-BILLABLE SERVICE: Performed by: REGISTERED NURSE

## 2023-10-03 NOTE — PATIENT INSTRUCTIONS
Continue the great work! Stay focused on meal planning and exercise efforts! Goal: getting 25 grams of fiber per day     Keep carb intake low  30-45 grams carbohydrate at each meal  Use [employer] to look up your history, patterns, pump interventions, etc      --can also see automated pump boluses on on your history                In your pump menu  Call the clinic with any questions!

## 2023-10-03 NOTE — PROGRESS NOTES
Goals Addressed                   This Visit's Progress     eat at least 2-3 times each day   On track     Exercise 3x per week (30 min per time)   On track     Reduce calorie intake to <1800 calories per day   On track          Meter download, medications, PMH and nursing assessment reviewed. [address] Nidia Lebron states She is willing to participate in this plan of care and verbalized understanding of all instructions provided. Teach back used to verify comprehension. Follow-up: 3 months    Total time involved in direct patient education: 60 minutes.    Individual Education appointment justified due to: Need for Additional Insulin Training

## 2023-12-07 ENCOUNTER — OFFICE VISIT (OUTPATIENT)
Dept: FAMILY MEDICINE CLINIC | Age: 48
End: 2023-12-07
Payer: COMMERCIAL

## 2023-12-07 ENCOUNTER — NURSE ONLY (OUTPATIENT)
Dept: LAB | Age: 48
End: 2023-12-07

## 2023-12-07 VITALS
DIASTOLIC BLOOD PRESSURE: 62 MMHG | WEIGHT: 242.4 LBS | RESPIRATION RATE: 16 BRPM | BODY MASS INDEX: 36.86 KG/M2 | HEART RATE: 88 BPM | SYSTOLIC BLOOD PRESSURE: 120 MMHG

## 2023-12-07 DIAGNOSIS — E10.65 UNCONTROLLED TYPE 1 DIABETES MELLITUS WITH HYPERGLYCEMIA (HCC): ICD-10-CM

## 2023-12-07 DIAGNOSIS — E78.00 PURE HYPERCHOLESTEROLEMIA: ICD-10-CM

## 2023-12-07 DIAGNOSIS — R80.9 MICROALBUMINURIA: ICD-10-CM

## 2023-12-07 DIAGNOSIS — E66.01 SEVERE OBESITY (BMI 35.0-39.9) WITH COMORBIDITY (HCC): ICD-10-CM

## 2023-12-07 DIAGNOSIS — E03.9 HYPOTHYROIDISM, UNSPECIFIED TYPE: ICD-10-CM

## 2023-12-07 DIAGNOSIS — E10.65 UNCONTROLLED TYPE 1 DIABETES MELLITUS WITH HYPERGLYCEMIA (HCC): Primary | ICD-10-CM

## 2023-12-07 DIAGNOSIS — Z51.81 MEDICATION MONITORING ENCOUNTER: ICD-10-CM

## 2023-12-07 DIAGNOSIS — E66.9 OBESITY (BMI 30-39.9): ICD-10-CM

## 2023-12-07 LAB
ALBUMIN SERPL BCG-MCNC: 3.9 G/DL (ref 3.5–5.1)
ALP SERPL-CCNC: 104 U/L (ref 38–126)
ALT SERPL W/O P-5'-P-CCNC: 11 U/L (ref 11–66)
ANION GAP SERPL CALC-SCNC: 9 MEQ/L (ref 8–16)
AST SERPL-CCNC: 15 U/L (ref 5–40)
BASOPHILS ABSOLUTE: 0.1 THOU/MM3 (ref 0–0.1)
BASOPHILS NFR BLD AUTO: 0.6 %
BILIRUB SERPL-MCNC: 0.4 MG/DL (ref 0.3–1.2)
BUN SERPL-MCNC: 13 MG/DL (ref 7–22)
CALCIUM SERPL-MCNC: 9.5 MG/DL (ref 8.5–10.5)
CHLORIDE SERPL-SCNC: 105 MEQ/L (ref 98–111)
CHOLEST SERPL-MCNC: 158 MG/DL (ref 100–199)
CO2 SERPL-SCNC: 25 MEQ/L (ref 23–33)
CREAT SERPL-MCNC: 0.8 MG/DL (ref 0.4–1.2)
CREAT UR-MCNC: 129.1 MG/DL
DEPRECATED MEAN GLUCOSE BLD GHB EST-ACNC: 129 MG/DL (ref 70–126)
DEPRECATED RDW RBC AUTO: 46.1 FL (ref 35–45)
EOSINOPHIL NFR BLD AUTO: 1.1 %
EOSINOPHILS ABSOLUTE: 0.1 THOU/MM3 (ref 0–0.4)
ERYTHROCYTE [DISTWIDTH] IN BLOOD BY AUTOMATED COUNT: 14.1 % (ref 11.5–14.5)
GFR SERPL CREATININE-BSD FRML MDRD: > 60 ML/MIN/1.73M2
GLUCOSE SERPL-MCNC: 108 MG/DL (ref 70–108)
HBA1C MFR BLD HPLC: 6.3 % (ref 4.4–6.4)
HCT VFR BLD AUTO: 42.4 % (ref 37–47)
HDLC SERPL-MCNC: 64 MG/DL
HGB BLD-MCNC: 13.3 GM/DL (ref 12–16)
IMM GRANULOCYTES # BLD AUTO: 0.04 THOU/MM3 (ref 0–0.07)
IMM GRANULOCYTES NFR BLD AUTO: 0.4 %
LDLC SERPL CALC-MCNC: 79 MG/DL
LYMPHOCYTES ABSOLUTE: 2.5 THOU/MM3 (ref 1–4.8)
LYMPHOCYTES NFR BLD AUTO: 23.4 %
MCH RBC QN AUTO: 28.4 PG (ref 26–33)
MCHC RBC AUTO-ENTMCNC: 31.4 GM/DL (ref 32.2–35.5)
MCV RBC AUTO: 90.4 FL (ref 81–99)
MICROALBUMIN UR-MCNC: < 1.2 MG/DL
MICROALBUMIN/CREAT RATIO PNL UR: 9 MG/G (ref 0–30)
MONOCYTES ABSOLUTE: 0.9 THOU/MM3 (ref 0.4–1.3)
MONOCYTES NFR BLD AUTO: 8.4 %
NEUTROPHILS NFR BLD AUTO: 66.1 %
NRBC BLD AUTO-RTO: 0 /100 WBC
PLATELET # BLD AUTO: 281 THOU/MM3 (ref 130–400)
PMV BLD AUTO: 12.2 FL (ref 9.4–12.4)
POTASSIUM SERPL-SCNC: 5.1 MEQ/L (ref 3.5–5.2)
PROT SERPL-MCNC: 7.7 G/DL (ref 6.1–8)
RBC # BLD AUTO: 4.69 MILL/MM3 (ref 4.2–5.4)
SEGMENTED NEUTROPHILS ABSOLUTE COUNT: 7.1 THOU/MM3 (ref 1.8–7.7)
SODIUM SERPL-SCNC: 139 MEQ/L (ref 135–145)
T4 FREE SERPL-MCNC: 1.27 NG/DL (ref 0.93–1.76)
TRIGL SERPL-MCNC: 74 MG/DL (ref 0–199)
TSH SERPL DL<=0.005 MIU/L-ACNC: 4.9 UIU/ML (ref 0.4–4.2)
WBC # BLD AUTO: 10.8 THOU/MM3 (ref 4.8–10.8)

## 2023-12-07 PROCEDURE — G8484 FLU IMMUNIZE NO ADMIN: HCPCS | Performed by: FAMILY MEDICINE

## 2023-12-07 PROCEDURE — 2022F DILAT RTA XM EVC RTNOPTHY: CPT | Performed by: FAMILY MEDICINE

## 2023-12-07 PROCEDURE — 3017F COLORECTAL CA SCREEN DOC REV: CPT | Performed by: FAMILY MEDICINE

## 2023-12-07 PROCEDURE — 99214 OFFICE O/P EST MOD 30 MIN: CPT | Performed by: FAMILY MEDICINE

## 2023-12-07 PROCEDURE — G8417 CALC BMI ABV UP PARAM F/U: HCPCS | Performed by: FAMILY MEDICINE

## 2023-12-07 PROCEDURE — 3052F HG A1C>EQUAL 8.0%<EQUAL 9.0%: CPT | Performed by: FAMILY MEDICINE

## 2023-12-07 PROCEDURE — 1036F TOBACCO NON-USER: CPT | Performed by: FAMILY MEDICINE

## 2023-12-07 PROCEDURE — G8427 DOCREV CUR MEDS BY ELIG CLIN: HCPCS | Performed by: FAMILY MEDICINE

## 2023-12-07 RX ORDER — PROCHLORPERAZINE 25 MG/1
SUPPOSITORY RECTAL
Qty: 2 EACH | Refills: 3 | Status: SHIPPED | OUTPATIENT
Start: 2023-12-07

## 2023-12-07 ASSESSMENT — ENCOUNTER SYMPTOMS
GASTROINTESTINAL NEGATIVE: 1
RESPIRATORY NEGATIVE: 1

## 2023-12-07 NOTE — PROGRESS NOTES
Leandro Lebron (:  1975) is a 46 y.o. female,Established patient, here for evaluation of the following chief complaint(s):  6 Month Follow-Up, Diabetes, and Medication Refill          Subjective   SUBJECTIVE/OBJECTIVE:  HPI  Chief Complaint   Patient presents with    6 Month Follow-Up    Diabetes    Medication Refill     6 month eval.    Sugars doing well, now on Omnipod 5 which she really likes. Lab Results   Component Value Date    LABA1C 8.4 (H) 2023    LABA1C 8.0 (H) 2023    LABA1C 7.7 (H) 2022     Lab Results   Component Value Date    MALBCR 8 2022    LDLCALC 97 2022    CREATININE 0.7 2022     BP Readings from Last 3 Encounters:   23 120/62   10/03/23 130/68   23 128/70     Wt Readings from Last 3 Encounters:   23 110 kg (242 lb 6.4 oz)   10/03/23 108.1 kg (238 lb 6.4 oz)   23 108.2 kg (238 lb 9.6 oz)       Patient Active Problem List   Diagnosis    Diabetes mellitus type 1, uncontrolled    Hypothyroid    Retinopathy of left eye    Microalbuminuria    Chronic constipation    Controlled type 1 diabetes mellitus without complication, with long-term current use of insulin ([address])    Uncontrolled type 1 diabetes mellitus without complication    Closed fracture of metatarsal bone    Sprain of right foot    Chest pain    Congestion of paranasal sinus    Severe obesity (BMI 35.0-39. 9) with comorbidity (HCC)       Current Outpatient Medications   Medication Sig Dispense Refill    Continuous Blood Gluc Transmit (DEXCOM G6 TRANSMITTER) MISC USE AS DIRECTED 2 each 3    Continuous Blood Gluc Sensor (DEXCOM G6 SENSOR) MISC USE AS DIRECTED 9 each 3    Insulin Disposable Pump (OMNIPOD 5 G6 INTRO, GEN 5,) KIT 1 each by Does not apply route once for 1 dose 1 kit 0    Insulin Disposable Pump (OMNIPOD 5 G6 POD, GEN 5,) MISC 1 each by Does not apply route every 3 days 10 each 5    HUMALOG 100 UNIT/ML SOLN injection vial USE AS DIRECTED PER PUMP SETTING.  MAX OF

## 2024-01-11 ENCOUNTER — OFFICE VISIT (OUTPATIENT)
Dept: INTERNAL MEDICINE CLINIC | Age: 49
End: 2024-01-11

## 2024-01-11 VITALS
BODY MASS INDEX: 36.74 KG/M2 | SYSTOLIC BLOOD PRESSURE: 122 MMHG | HEART RATE: 75 BPM | WEIGHT: 242.4 LBS | DIASTOLIC BLOOD PRESSURE: 64 MMHG | HEIGHT: 68 IN | TEMPERATURE: 98.4 F

## 2024-01-11 DIAGNOSIS — E10.9 TYPE 1 DIABETES MELLITUS WITHOUT COMPLICATION (HCC): Primary | ICD-10-CM

## 2024-01-11 PROCEDURE — NBSRV NON-BILLABLE SERVICE: Performed by: REGISTERED NURSE

## 2024-01-11 RX ORDER — ASCORBIC ACID 500 MG
500 TABLET ORAL DAILY
COMMUNITY

## 2024-01-11 RX ORDER — ZINC GLUCONATE 50 MG
50 TABLET ORAL DAILY
COMMUNITY

## 2024-01-11 NOTE — PATIENT INSTRUCTIONS
Blood sugar target 110mg         Correct above 5:30a-10am 110mg  By Saturday 1/20/24 --review how your blood sugars             Have been doing after you get up and get ready          --are they still rising above 160/180    If they still rising--plan for 5-10 minutes of stretch or              Yoga exercise right after you get out of bed       By Saturday1/27/2024 --are your blood sugars still           Rising while you get ready? ?Take a 0.2 unit bolus          Before you start getting ready  Stay focus on meal plan!! Make sure you are over the            Holiday eating.       Look for more vegetables and healthy proteins!

## 2024-01-11 NOTE — PROGRESS NOTES
[facility]  [address] [place] [address]  [phone] (phone)  [phone] (fax)    Patient ID: Aria Saavedra 1975  Referring Provider: Dr. Kearney     Patient's name and  were verified.    Subjective:    She presents for a follow-up diabetic visit. She has type 1 diabetes mellitus. She is compliant some of the time.  Assessment:     Lab Results   Component Value Date/Time    LABA1C 6.3 2023 06:39 AM    BUN 13 2023 06:39 AM    CREATININE 0.8 2023 06:39 AM     Vitals:    24 0827 24 0828   BP: (!) 146/65 122/64   Site: Right Upper Arm Left Upper Arm   Position: Sitting Sitting   Pulse: 75    Temp: 98.4 °F (36.9 °C)    Weight: 110 kg (242 lb 6.4 oz)    Height: 1.727 m (5' 8\")      Wt Readings from Last 3 Encounters:   24 110 kg (242 lb 6.4 oz)   23 110 kg (242 lb 6.4 oz)   10/03/23 108.1 kg (238 lb 6.4 oz)     Ht Readings from Last 3 Encounters:   24 1.727 m (5' 8\")   10/03/23 1.727 m (5' 8\")   23 1.727 m (5' 8\")     Est, Glom Filt Rate   Date Value Ref Range Status   2023 >60 >60 ml/min/1.73m2 Final     Diabetes Pharmacotherapy:  Humalog insulin per Omnipod 5 pump - Uses Automated Mode    Glucose Trends:   Glucose at FBS today resulted at 221mg/dl  Current monitoring regimen: Dexcom CGM - checks 4+ times daily  Home blood sugar trends:    -V. High: 8%, High: 17%, Target: 74%, Low: 1%, V. Low: 0%   -Average Glucose: 156mg/dL; GMI: 7.0%;  %time CGM active 92.1%              -glucose levels rise starting at 6am (gluconeogenesis); overall blood sugars in goal; random glucose excursions from meals (often   Any episodes of hypoglycemia? yes - 1 per week average; highest risk in the afternoon/ at work   -Treats with hawiaan punch    Lifestyle Factors:   Previous visit with dietician: yes - 6/3/2021  Current diet: B: Premier Protein Shake            L: cottage cheese/ spag sauce/ parm cheese/ turkey pepp                       D: sc

## 2024-01-12 NOTE — PATIENT INSTRUCTIONS
You may receive a survey about your visit with us today. The feedback from our patients helps us identify what is working well and where the service to all patients can be enhanced. Thank you!
atrial fibrillation

## 2024-01-25 DIAGNOSIS — E10.65 UNCONTROLLED TYPE 1 DIABETES MELLITUS WITH HYPERGLYCEMIA (HCC): ICD-10-CM

## 2024-01-25 RX ORDER — PROCHLORPERAZINE 25 MG/1
SUPPOSITORY RECTAL
Qty: 2 EACH | Refills: 3 | Status: SHIPPED | OUTPATIENT
Start: 2024-01-25

## 2024-01-31 ENCOUNTER — TELEPHONE (OUTPATIENT)
Dept: FAMILY MEDICINE CLINIC | Age: 49
End: 2024-01-31

## 2024-01-31 NOTE — TELEPHONE ENCOUNTER
Ovidio with [employer] called office stating he is faxing over orders for [employer] Dexcom G6 sensor and transmitter. Please advise.

## 2024-02-08 ENCOUNTER — TELEPHONE (OUTPATIENT)
Dept: FAMILY MEDICINE CLINIC | Age: 49
End: 2024-02-08

## 2024-02-08 DIAGNOSIS — E10.65 UNCONTROLLED TYPE 1 DIABETES MELLITUS WITH HYPERGLYCEMIA (HCC): ICD-10-CM

## 2024-02-08 RX ORDER — INSULIN PMP CART,AUT,G6/7,CNTR
1 EACH SUBCUTANEOUS
Qty: 30 EACH | Refills: 3 | Status: SHIPPED | OUTPATIENT
Start: 2024-02-08

## 2024-02-08 RX ORDER — INSULIN LISPRO 100 [IU]/ML
INJECTION, SOLUTION INTRAVENOUS; SUBCUTANEOUS
Qty: 90 ML | Refills: 3 | Status: SHIPPED | OUTPATIENT
Start: 2024-02-08

## 2024-02-08 NOTE — TELEPHONE ENCOUNTER
Fax received from [facility] requesting a new 90 day script be sent to them for the patients Humalog Vial 10ml 100U/ml.  Please send in a 90 day supply.      Fax received requesting a 90 day supply of the patients Omnipod-5 G pack be sent to "[facility] also.  Please advise.

## 2024-02-14 ENCOUNTER — PATIENT MESSAGE (OUTPATIENT)
Dept: FAMILY MEDICINE CLINIC | Age: 49
End: 2024-02-14

## 2024-02-14 NOTE — TELEPHONE ENCOUNTER
From: Aria Saavedra  To: Dr. Gerardo Kearney  Sent: 2/14/2024 6:00 AM EST  Subject: [facility]    Hello,   I received an email from [facility]. There is a delay on my humalog insulin. It says they need some more information from you. Would you please check on this? I am so sorry, the way I have to do all this is ridiculous. I have had nothing but problems, so far. The email from them says they have tried several times to get a response from you??? If you could help me, it would be greatly appreciated.    [employer] [facility]  [phone] - med mutual ID #[id number]  Group #[id number]    Aria Saavedra

## 2024-04-18 ENCOUNTER — OFFICE VISIT (OUTPATIENT)
Dept: INTERNAL MEDICINE CLINIC | Age: 49
End: 2024-04-18

## 2024-04-18 VITALS
DIASTOLIC BLOOD PRESSURE: 68 MMHG | WEIGHT: 244.6 LBS | BODY MASS INDEX: 37.19 KG/M2 | HEART RATE: 80 BPM | SYSTOLIC BLOOD PRESSURE: 118 MMHG | TEMPERATURE: 97.5 F

## 2024-04-18 DIAGNOSIS — E10.9 CONTROLLED TYPE 1 DIABETES MELLITUS WITHOUT COMPLICATION, WITH LONG-TERM CURRENT USE OF INSULIN (HCC): Primary | ICD-10-CM

## 2024-04-18 PROCEDURE — NBSRV NON-BILLABLE SERVICE

## 2024-04-18 NOTE — TELEPHONE ENCOUNTER
Dr. Kearney,    Aria is being seen by [facility] for T1DM.     Diabetes Pharmacotherapy:  Humalog insulin per Omnipod 5 pump - Uses Automated Mode.     Glucose Trends:   Current monitoring regimen: Dexcom CGM - checks 4+ times daily  Home blood sugar trends:               -V. High: 2%, High: 21%, Target: 75%, Low: 1%, V. Low: 1%              -Average Glucose: 151 mg/dL; GMI: 6.9%;  %time CGM active 95.1%  Any episodes of hypoglycemia? yes - couple times per week. A few false lows associated with placement of new CGM & pt needed to calibrate with fingersticks              -Treats with glucose tablets OR orange/apple juice       Last A1C: 6.3% (12/7/23)     Wt Readings from Last 3 Encounters:   04/18/24 110.9 kg (244 lb 9.6 oz)   01/11/24 110 kg (242 lb 6.4 oz)   12/07/23 110 kg (242 lb 6.4 oz)     BMI: 37.19    Considerations:   -Aria is concerned about her continued weight gain, especially during the time since her hysterectomy. She expressed interest in use of an off-label GLP-1 or GLP-1/GIP agonist   -I see no relevant contraindications and think she would be a good candidate, pending insurance approval    Please message back and/or review pended order(s) in response to the above.    Thank you for allowing me to participate in the care of your patient,     Latasha Olmstead, PharmD, BCPS, [facility]  Internal Medicine Clinical Pharmacist  [phone]

## 2024-04-18 NOTE — PROGRESS NOTES
Patient case was reviewed with Silvia Bañuelos PharmD, PGY-1 Resident; I agree with the assessment/plan in Silvia's note. I independently assessed the patient and provided additional education.     Total time involved in direct patient education: 30 minutes.     For Pharmacy Admin Tracking Only    Program: Medical Group  Time Spent (min): 45        Latasha Olmstead PharmD, BCPS, [facility]  Internal Medicine Clinical Pharmacist  [phone]    
water during the day (uses 64 oz water bottle)  Current exercise: elliptical 3x per week. Plans to get active outside with nicer weather. Daughter goes to [employer] and has been asking Aria to go with, but she has been hesitant to start going to the gym.    Health Maintenance:  There are no preventive care reminders to display for this patient.    Eye exam current (within one year): yes Date: 6/19/2023, Dr. Pedro   Any history of foot problems? no  Foot exam up to date: yes Date: 12/7/2023, PCP  Immunizations up to date:    Pneumonia (dose of Prevnar 20 OR Pneumovax 23 + Prevnar 13): no   Influenza: n/a  The 10-year ASCVD risk score (Magnus DICKENS, et al., 2019) is: 1.5%    Last panel - LDL:   Lab Results   Component Value Date    LDLCALC 79 12/07/2023     Taking ASA:  No   Taking statin: No; indicated, currently not taking  Last microalbumin/creatinine ratio:     Microalb/Creat Ratio   Date Value Ref Range Status   12/07/2023 9 0 - 30 mg/g Final     Comment:     Performed at [facility] [address] [place] [address]      Taking ACE/ARB: Yes- lisinopril  Depression: Not at risk (6/1/2023)    PHQ-2     PHQ-2 Score: 0        Focus:   Diabetes Education. Last A1C: 6.3% (12/7/23)     Aria is doing well. Her blood sugars are at goal (TIR > 70%), with occasional lows. She eats a low-carb diet and is fairly active. She has noticed some changes associated with menopause, including slight weight gain, fatigue, and night sweats. Discussed potentially starting a GLP-1/GIP (mounjaro) to assist with weight loss and improve insulin sensitivity. Pt denies history of gastroparesis, pancreatitis, and thyroid cancer. If GLP-1/GIP is not covered by insurance or not tolerated, can consider starting metformin to improve insulin sensitivity. Also discussed adding strength/resistance training to current exercise regimen. Recommended [id number], which rAia expressed that she will ask about at next PCP appt.

## 2024-04-18 NOTE — PATIENT INSTRUCTIONS
Work on adding in some strength/resistance training - consider 2 days a week - this is even more beneficial than a medicine    2. We will talk to Dr. Kearney about the possibility of adding Mounjaro (off-label to assist with weight loss)   -Another option would be metformin    3. Consider the Prevnar 20 pneumonia vaccine    4. Turn on Activity/Exercise mode if you notice lows with heavy exercise or if you are battling more frequent low blood sugars

## 2024-04-19 RX ORDER — TIRZEPATIDE 2.5 MG/.5ML
2.5 INJECTION, SOLUTION SUBCUTANEOUS WEEKLY
Qty: 4 EACH | Refills: 1 | Status: SHIPPED | OUTPATIENT
Start: 2024-04-19

## 2024-04-22 ENCOUNTER — TELEPHONE (OUTPATIENT)
Dept: FAMILY MEDICINE CLINIC | Age: 49
End: 2024-04-22

## 2024-04-22 NOTE — TELEPHONE ENCOUNTER
Mounjaro was denied by insurance.   Coverage is provided for the treatment of type 2 diabetes mellitus.     Diagnosis used:   Uncontrolled type 1 diabetes mellitus with hyperglycemia (HCC)  - Primary E10.65

## 2024-04-22 NOTE — TELEPHONE ENCOUNTER
Received a notification through cover my meds for patient MOUNJARO.     PA needed.     PA completed, PA pending through cover my meds.

## 2024-06-03 SDOH — ECONOMIC STABILITY: FOOD INSECURITY: WITHIN THE PAST 12 MONTHS, YOU WORRIED THAT YOUR FOOD WOULD RUN OUT BEFORE YOU GOT MONEY TO BUY MORE.: NEVER TRUE

## 2024-06-03 SDOH — ECONOMIC STABILITY: FOOD INSECURITY: WITHIN THE PAST 12 MONTHS, THE FOOD YOU BOUGHT JUST DIDN'T LAST AND YOU DIDN'T HAVE MONEY TO GET MORE.: NEVER TRUE

## 2024-06-03 SDOH — ECONOMIC STABILITY: TRANSPORTATION INSECURITY
IN THE PAST 12 MONTHS, HAS LACK OF TRANSPORTATION KEPT YOU FROM MEETINGS, WORK, OR FROM GETTING THINGS NEEDED FOR DAILY LIVING?: NO

## 2024-06-03 SDOH — ECONOMIC STABILITY: INCOME INSECURITY: HOW HARD IS IT FOR YOU TO PAY FOR THE VERY BASICS LIKE FOOD, HOUSING, MEDICAL CARE, AND HEATING?: NOT HARD AT ALL

## 2024-06-03 ASSESSMENT — PATIENT HEALTH QUESTIONNAIRE - PHQ9
SUM OF ALL RESPONSES TO PHQ QUESTIONS 1-9: 0
SUM OF ALL RESPONSES TO PHQ QUESTIONS 1-9: 0
SUM OF ALL RESPONSES TO PHQ9 QUESTIONS 1 & 2: 0
SUM OF ALL RESPONSES TO PHQ9 QUESTIONS 1 & 2: 0
2. FEELING DOWN, DEPRESSED OR HOPELESS: NOT AT ALL
SUM OF ALL RESPONSES TO PHQ QUESTIONS 1-9: 0
2. FEELING DOWN, DEPRESSED OR HOPELESS: NOT AT ALL
1. LITTLE INTEREST OR PLEASURE IN DOING THINGS: NOT AT ALL
SUM OF ALL RESPONSES TO PHQ QUESTIONS 1-9: 0
1. LITTLE INTEREST OR PLEASURE IN DOING THINGS: NOT AT ALL

## 2024-06-05 ENCOUNTER — TELEPHONE (OUTPATIENT)
Dept: FAMILY MEDICINE CLINIC | Age: 49
End: 2024-06-05

## 2024-06-05 ENCOUNTER — NURSE ONLY (OUTPATIENT)
Dept: LAB | Age: 49
End: 2024-06-05

## 2024-06-05 NOTE — TELEPHONE ENCOUNTER
Patient calling to see if she needs labs done prior to appt tomorrow.  She was at the lab and they stated they had no current orders.  Please advise

## 2024-06-06 ENCOUNTER — OFFICE VISIT (OUTPATIENT)
Dept: FAMILY MEDICINE CLINIC | Age: 49
End: 2024-06-06
Payer: COMMERCIAL

## 2024-06-06 VITALS
WEIGHT: 242.4 LBS | BODY MASS INDEX: 36.86 KG/M2 | DIASTOLIC BLOOD PRESSURE: 68 MMHG | RESPIRATION RATE: 18 BRPM | HEART RATE: 76 BPM | SYSTOLIC BLOOD PRESSURE: 128 MMHG

## 2024-06-06 DIAGNOSIS — E10.65 UNCONTROLLED TYPE 1 DIABETES MELLITUS WITH HYPERGLYCEMIA (HCC): Primary | ICD-10-CM

## 2024-06-06 DIAGNOSIS — E66.9 OBESITY (BMI 30-39.9): ICD-10-CM

## 2024-06-06 DIAGNOSIS — E03.9 HYPOTHYROIDISM, UNSPECIFIED TYPE: ICD-10-CM

## 2024-06-06 DIAGNOSIS — E78.00 PURE HYPERCHOLESTEROLEMIA: ICD-10-CM

## 2024-06-06 DIAGNOSIS — Z51.81 MEDICATION MONITORING ENCOUNTER: ICD-10-CM

## 2024-06-06 DIAGNOSIS — R80.9 MICROALBUMINURIA: ICD-10-CM

## 2024-06-06 PROCEDURE — 3017F COLORECTAL CA SCREEN DOC REV: CPT | Performed by: FAMILY MEDICINE

## 2024-06-06 PROCEDURE — G2211 COMPLEX E/M VISIT ADD ON: HCPCS | Performed by: FAMILY MEDICINE

## 2024-06-06 PROCEDURE — G8427 DOCREV CUR MEDS BY ELIG CLIN: HCPCS | Performed by: FAMILY MEDICINE

## 2024-06-06 PROCEDURE — 1036F TOBACCO NON-USER: CPT | Performed by: FAMILY MEDICINE

## 2024-06-06 PROCEDURE — 3046F HEMOGLOBIN A1C LEVEL >9.0%: CPT | Performed by: FAMILY MEDICINE

## 2024-06-06 PROCEDURE — G8417 CALC BMI ABV UP PARAM F/U: HCPCS | Performed by: FAMILY MEDICINE

## 2024-06-06 PROCEDURE — 2022F DILAT RTA XM EVC RTNOPTHY: CPT | Performed by: FAMILY MEDICINE

## 2024-06-06 PROCEDURE — 99214 OFFICE O/P EST MOD 30 MIN: CPT | Performed by: FAMILY MEDICINE

## 2024-06-06 RX ORDER — TIRZEPATIDE 2.5 MG/.5ML
2.5 INJECTION, SOLUTION SUBCUTANEOUS WEEKLY
Qty: 2 ML | Refills: 0 | Status: SHIPPED | OUTPATIENT
Start: 2024-06-06

## 2024-06-06 ASSESSMENT — ENCOUNTER SYMPTOMS
RESPIRATORY NEGATIVE: 1
GASTROINTESTINAL NEGATIVE: 1

## 2024-06-06 NOTE — PROGRESS NOTES
Aria Saavedra (:  1975) is a 49 y.o. female,Established patient, here for evaluation of the following chief complaint(s):  6 Month Follow-Up, Diabetes, and Health Maintenance (Needs a A1C)          Subjective   SUBJECTIVE/OBJECTIVE:  HPI  Chief Complaint   Patient presents with    6 Month Follow-Up    Diabetes    Health Maintenance     Needs a A1C     6 month eval.    Sugars are doing really well.  Lab Results   Component Value Date    LABA1C 6.3 2023    LABA1C 8.4 (H) 2023    LABA1C 8.0 (H) 2023     Lab Results   Component Value Date    MALBCR 9 2023    CREATININE 0.8 2023     BP Readings from Last 3 Encounters:   24 128/68   24 118/68   24 122/64     Wt Readings from Last 3 Encounters:   24 110 kg (242 lb 6.4 oz)   24 110.9 kg (244 lb 9.6 oz)   24 110 kg (242 lb 6.4 oz)       Patient Active Problem List   Diagnosis    Diabetes mellitus type 1, uncontrolled    Hypothyroid    Retinopathy of left eye    Microalbuminuria    Chronic constipation    Controlled type 1 diabetes mellitus without complication, with long-term current use of insulin (HCC)    Uncontrolled type 1 diabetes mellitus without complication    Closed fracture of metatarsal bone    Sprain of right foot    Chest pain    Congestion of paranasal sinus    Severe obesity (BMI 35.0-39.9) with comorbidity (HCC)       Current Outpatient Medications   Medication Sig Dispense Refill    Tirzepatide-Weight Management (ZEPBOUND) 2.5 MG/0.5ML SOAJ Inject 2.5 mg into the skin once a week 2 mL 0    HUMALOG 100 UNIT/ML SOLN injection vial USE AS DIRECTED PER PUMP SETTING. MAX   UNITS DAILY 90 mL 3    Insulin Disposable Pump (OMNIPOD 5 G6 POD, GEN 5,) MISC 1 each by Does not apply route every 3 days 30 each 3    Continuous Blood Gluc Transmit (DEXCOM G6 TRANSMITTER) MISC USE AS DIRECTED 2 each 3    vitamin C (ASCORBIC ACID) 500 MG tablet Take 1 tablet by mouth daily      VITAMIN

## 2024-06-07 ENCOUNTER — TELEPHONE (OUTPATIENT)
Dept: FAMILY MEDICINE CLINIC | Age: 49
End: 2024-06-07

## 2024-06-07 NOTE — TELEPHONE ENCOUNTER
Notification received through CoverMyMeds requesting a PA on the patients Zepbound.  PA submitted, waiting on determination.

## 2024-06-10 NOTE — TELEPHONE ENCOUNTER
Outcome  Approved on June 7  CaseId:[id number];Status:Approved;Review Type:Prior Auth;Coverage Start Date:05/08/2024;Coverage End Date:02/02/2025;  Authorization Expiration Date: 2/1/2025      Called and updated the patient, she stated that she was notified by [employer] over the weekend and has already picked the medication up.

## 2024-07-03 RX ORDER — TIRZEPATIDE 5 MG/.5ML
5 INJECTION, SOLUTION SUBCUTANEOUS WEEKLY
Qty: 2 ML | Refills: 0 | Status: SHIPPED | OUTPATIENT
Start: 2024-07-03

## 2024-07-30 ENCOUNTER — HOSPITAL ENCOUNTER (EMERGENCY)
Age: 49
Discharge: HOME | End: 2024-07-30
Payer: COMMERCIAL

## 2024-07-30 VITALS
DIASTOLIC BLOOD PRESSURE: 66 MMHG | OXYGEN SATURATION: 98 % | HEART RATE: 95 BPM | TEMPERATURE: 98.8 F | RESPIRATION RATE: 16 BRPM | SYSTOLIC BLOOD PRESSURE: 128 MMHG

## 2024-07-30 DIAGNOSIS — U07.1 COVID: Primary | ICD-10-CM

## 2024-07-30 LAB
S PYO AG THROAT QL: NEGATIVE
SARS-COV-2 RDRP RESP QL NAA+PROBE: DETECTED

## 2024-07-30 PROCEDURE — 99214 OFFICE O/P EST MOD 30 MIN: CPT

## 2024-07-30 PROCEDURE — 99213 OFFICE O/P EST LOW 20 MIN: CPT

## 2024-07-30 PROCEDURE — 87651 STREP A DNA AMP PROBE: CPT

## 2024-07-30 PROCEDURE — 87635 SARS-COV-2 COVID-19 AMP PRB: CPT

## 2024-07-30 RX ORDER — TIRZEPATIDE 5 MG/.5ML
5 INJECTION, SOLUTION SUBCUTANEOUS WEEKLY
Qty: 2 ML | Refills: 0 | Status: SHIPPED | OUTPATIENT
Start: 2024-07-30

## 2024-07-30 RX ORDER — FLUTICASONE PROPIONATE 50 MCG
2 SPRAY, SUSPENSION (ML) NASAL DAILY
Qty: 16 G | Refills: 0 | Status: SHIPPED | OUTPATIENT
Start: 2024-07-30

## 2024-07-30 RX ORDER — BROMPHENIRAMINE MALEATE, PSEUDOEPHEDRINE HYDROCHLORIDE, AND DEXTROMETHORPHAN HYDROBROMIDE 2; 30; 10 MG/5ML; MG/5ML; MG/5ML
5 SYRUP ORAL 4 TIMES DAILY PRN
Qty: 118 ML | Refills: 0 | Status: SHIPPED | OUTPATIENT
Start: 2024-07-30 | End: 2024-08-06

## 2024-07-30 ASSESSMENT — ENCOUNTER SYMPTOMS
EYES NEGATIVE: 1
ALLERGIC/IMMUNOLOGIC NEGATIVE: 1
COUGH: 1
GASTROINTESTINAL NEGATIVE: 1

## 2024-07-30 ASSESSMENT — PAIN - FUNCTIONAL ASSESSMENT: PAIN_FUNCTIONAL_ASSESSMENT: NONE - DENIES PAIN

## 2024-07-30 NOTE — DISCHARGE INSTRUCTIONS
Medications as prescribed.  Increase fluid intake.  Can use Cepacol, hot tea with honey or throat lozenges for sore throat.  Can try hot steam showers.  Follow-up with family doctor in 3 to 5 days.  Go to emergency room for any shortness of breath, chest pain or new or worsening symptoms.  Work note given.

## 2024-07-30 NOTE — ED PROVIDER NOTES
5,) KIT    1 each by Does not apply route once for 1 dose    INSULIN DISPOSABLE PUMP (OMNIPOD 5 G6 POD, GEN 5,) MISC    1 each by Does not apply route every 3 days    LISINOPRIL (ZESTRIL) 2.5 MG TABLET    Take 1 tablet by mouth daily    SYNTHROID 200 MCG TABLET    Take 1 tablet by mouth daily Take with water on an empty stomach- wait 30 minutes before eating or taking other meds.    TIRZEPATIDE-WEIGHT MANAGEMENT (ZEPBOUND) 5 MG/0.5ML SOAJ    Inject 5 mg into the skin once a week    VITAMIN C (ASCORBIC ACID) 500 MG TABLET    Take 1 tablet by mouth daily    VITAMIN D PO    Take 1,000 Units by mouth daily    ZINC 50 MG TABS TABLET    Take 1 tablet by mouth daily       ALLERGIES     Patient is is allergic to latex, ciprofloxacin, doxycycline, penicillins, influenza vac typ a&b surf ant, and morphine.    Patients   Immunization History   Administered Date(s) Administered    COVID-19, PFIZER PURPLE top, DILUTE for use, (age 12 y+), 30mcg/0.3mL 03/13/2021, 04/10/2021    Influenza Virus Vaccine 04/13/2017    Influenza, AFLURIA (age 3 yrs+), FLUZONE, (age 6 mo+), MDV, 0.5mL 09/25/2017, 12/06/2018    Influenza, FLUARIX, FLULAVAL, FLUZONE (age 6 mo+) AND AFLURIA, (age 3 y+), PF, 0.5mL 09/12/2019       FAMILY HISTORY     Patient's family history includes Breast Cancer (age of onset: 50) in her mother; Cancer in her father; High Blood Pressure in her mother.    SOCIAL HISTORY     Patient  reports that she has never smoked. She has never been exposed to tobacco smoke. She has never used smokeless tobacco. She reports that she does not currently use alcohol. She reports that she does not use drugs.    PHYSICAL EXAM     ED TRIAGE VITALS  BP: 128/66, Temp: 98.8 °F (37.1 °C), Pulse: 95, Respirations: 16, SpO2: 98 %,Estimated body mass index is 36.86 kg/m² as calculated from the following:    Height as of 1/11/24: 1.727 m (5' 8\").    Weight as of 6/6/24: 110 kg (242 lb 6.4 oz).,Patient's last menstrual period was

## 2024-07-31 ENCOUNTER — TELEPHONE (OUTPATIENT)
Dept: FAMILY MEDICINE CLINIC | Age: 49
End: 2024-07-31

## 2024-07-31 ENCOUNTER — CARE COORDINATION (OUTPATIENT)
Dept: CARE COORDINATION | Age: 49
End: 2024-07-31

## 2024-07-31 NOTE — CARE COORDINATION
Patient contacted regarding COVID-19 diagnosis. Discussed COVID-19 related testing which was available at this time. Test results were positive. Patient informed of results, if available? Patient aware of positive results prior to ACM f/u call being completed.  Results were again reviewed during our call.      Ambulatory Care Manager contacted the patient by telephone to perform post discharge assessment. Call within 2 business days of discharge: Yes. Verified name and  with patient as identifiers. Provided introduction to self, and explanation of the CTN/ACM role, and reason for call due to risk factors for infection and/or exposure to COVID-19.     Symptoms reviewed with patient who verbalized the following symptoms: fatigue  cough  no new symptoms  no worsening symptoms  Sore throat .      Due to no new or worsening symptoms encounter was not routed to provider for escalation. Discussed follow-up appointments. If no appointment was previously scheduled, appointment scheduling offered: Patient will call and schedule PCP f/u appointment on her own and she declined any assistance.  [facility] follow up appointment(s):   Future Appointments   Date Time Provider Department Center   2024  8:00 AM Izzy Flor, RN [facility]   2024  7:45 AM Gerardo Kear[facility], DO [facility]     Non-[facility] follow up appointment(s): N/A     Non-face-to-face services provided:  Obtained and reviewed discharge summary and/or continuity of care documents  Education of patient/family/caregiver/guardian to support self-management-COVID-19 education and zone information was reviewed with patient.  ACM educated patient on what to report and when to follow up.  Importance of early symptom recognition and follow up reviewed with patient as well as when to seek medical attention.      Advance Care Planning:   Does patient have an Advance Directive:   Not reviewed during our call .     Educated patient about risk

## 2024-08-05 ENCOUNTER — TELEMEDICINE (OUTPATIENT)
Dept: FAMILY MEDICINE CLINIC | Age: 49
End: 2024-08-05
Payer: COMMERCIAL

## 2024-08-05 DIAGNOSIS — U07.1 COVID-19: Primary | ICD-10-CM

## 2024-08-05 PROCEDURE — 99213 OFFICE O/P EST LOW 20 MIN: CPT | Performed by: FAMILY MEDICINE

## 2024-08-05 PROCEDURE — G8428 CUR MEDS NOT DOCUMENT: HCPCS | Performed by: FAMILY MEDICINE

## 2024-08-05 PROCEDURE — G2211 COMPLEX E/M VISIT ADD ON: HCPCS | Performed by: FAMILY MEDICINE

## 2024-08-05 PROCEDURE — 3017F COLORECTAL CA SCREEN DOC REV: CPT | Performed by: FAMILY MEDICINE

## 2024-08-05 RX ORDER — BENZONATATE 100 MG/1
100-200 CAPSULE ORAL 3 TIMES DAILY PRN
Qty: 30 CAPSULE | Refills: 0 | Status: SHIPPED | OUTPATIENT
Start: 2024-08-05

## 2024-08-05 ASSESSMENT — ENCOUNTER SYMPTOMS
WHEEZING: 0
COUGH: 1
GASTROINTESTINAL NEGATIVE: 1
SHORTNESS OF BREATH: 0
CHEST TIGHTNESS: 1

## 2024-08-05 NOTE — PROGRESS NOTES
Aria Saavedra (:  1975) is a Established patient, here for evaluation of the following:    Subjective   HPI:    Chief Complaint   Patient presents with    Concern For COVID-19     See message below:    Dr. Kearney,     As you know, I have Covid.  I still have have sore throat, drainage, coughing.  I am tired, can’t sleep because of the coughing.  My sugars have been a little elevated ( to be expected when I’m not feeling well).  I was going to take a couple more days off work but they may request a note from a doctor.  My question is do I need to make an appointment or an evisit??  Would you write me a letter for a couple more days off work?       Patient Active Problem List   Diagnosis    Diabetes mellitus type 1, uncontrolled    Hypothyroid    Retinopathy of left eye    Microalbuminuria    Chronic constipation    Controlled type 1 diabetes mellitus without complication, with long-term current use of insulin (HCC)    Uncontrolled type 1 diabetes mellitus without complication    Closed fracture of metatarsal bone    Sprain of right foot    Chest pain    Congestion of paranasal sinus    Severe obesity (BMI 35.0-39.9) with comorbidity (HCC)     Past Surgical History:   Procedure Laterality Date     SECTION  , 2006    CHOLECYSTECTOMY  2010    ENDOMETRIAL ABLATION  2008    HYSTERECTOMY (CERVIX STATUS UNKNOWN)      PARTIAL HYSTERECTOMY (CERVIX NOT REMOVED)  4/14/15    Dr. Diamond ovary present bilateral     SHOULDER SURGERY Left 2017    TUBAL LIGATION  2006     Social History     Tobacco Use    Smoking status: Never     Passive exposure: Never    Smokeless tobacco: Never   Vaping Use    Vaping Use: Never used   Substance Use Topics    Alcohol use: Not Currently     Comment: rarely    Drug use: No     Prior to Admission medications    Medication Sig Start Date End Date Taking? Authorizing Provider   benzonatate (TESSALON) 100 MG capsule Take 1-2 capsules by mouth 3 times daily as needed for Cough

## 2024-08-28 RX ORDER — TIRZEPATIDE 5 MG/.5ML
5 INJECTION, SOLUTION SUBCUTANEOUS WEEKLY
Qty: 6 ML | Refills: 3 | Status: SHIPPED | OUTPATIENT
Start: 2024-08-28

## 2024-09-05 ENCOUNTER — OFFICE VISIT (OUTPATIENT)
Dept: INTERNAL MEDICINE CLINIC | Age: 49
End: 2024-09-05
Payer: COMMERCIAL

## 2024-09-05 VITALS
DIASTOLIC BLOOD PRESSURE: 78 MMHG | SYSTOLIC BLOOD PRESSURE: 124 MMHG | WEIGHT: 214 LBS | HEIGHT: 68 IN | TEMPERATURE: 97.1 F | HEART RATE: 78 BPM | BODY MASS INDEX: 32.43 KG/M2

## 2024-09-05 DIAGNOSIS — E10.9 CONTROLLED TYPE 1 DIABETES MELLITUS WITHOUT COMPLICATION, WITH LONG-TERM CURRENT USE OF INSULIN (HCC): Primary | ICD-10-CM

## 2024-09-05 LAB — HBA1C MFR BLD: 6.3 % (ref 4.3–5.7)

## 2024-09-05 PROCEDURE — 83036 HEMOGLOBIN GLYCOSYLATED A1C: CPT | Performed by: REGISTERED NURSE

## 2024-09-05 PROCEDURE — G0108 DIAB MANAGE TRN  PER INDIV: HCPCS | Performed by: REGISTERED NURSE

## 2024-09-05 PROCEDURE — 99999 PR OFFICE/OUTPT VISIT,PROCEDURE ONLY: CPT | Performed by: REGISTERED NURSE

## 2024-12-11 ENCOUNTER — LAB (OUTPATIENT)
Dept: LAB | Age: 49
End: 2024-12-11

## 2024-12-11 DIAGNOSIS — E78.00 PURE HYPERCHOLESTEROLEMIA: ICD-10-CM

## 2024-12-11 DIAGNOSIS — E10.65 UNCONTROLLED TYPE 1 DIABETES MELLITUS WITH HYPERGLYCEMIA (HCC): ICD-10-CM

## 2024-12-11 DIAGNOSIS — Z51.81 MEDICATION MONITORING ENCOUNTER: ICD-10-CM

## 2024-12-11 DIAGNOSIS — E03.9 HYPOTHYROIDISM, UNSPECIFIED TYPE: ICD-10-CM

## 2024-12-11 LAB
ALBUMIN SERPL BCG-MCNC: 4.1 G/DL (ref 3.5–5.1)
ALP SERPL-CCNC: 80 U/L (ref 38–126)
ALT SERPL W/O P-5'-P-CCNC: 12 U/L (ref 11–66)
ANION GAP SERPL CALC-SCNC: 11 MEQ/L (ref 8–16)
AST SERPL-CCNC: 13 U/L (ref 5–40)
BASOPHILS ABSOLUTE: 0.1 THOU/MM3 (ref 0–0.1)
BASOPHILS NFR BLD AUTO: 0.7 %
BILIRUB SERPL-MCNC: 0.2 MG/DL (ref 0.3–1.2)
BUN SERPL-MCNC: 15 MG/DL (ref 7–22)
CALCIUM SERPL-MCNC: 9.2 MG/DL (ref 8.5–10.5)
CHLORIDE SERPL-SCNC: 104 MEQ/L (ref 98–111)
CHOLEST SERPL-MCNC: 144 MG/DL (ref 100–199)
CO2 SERPL-SCNC: 26 MEQ/L (ref 23–33)
CREAT SERPL-MCNC: 0.6 MG/DL (ref 0.4–1.2)
CREAT UR-MCNC: 162.9 MG/DL
DEPRECATED MEAN GLUCOSE BLD GHB EST-ACNC: 117 MG/DL (ref 70–126)
DEPRECATED RDW RBC AUTO: 47.8 FL (ref 35–45)
EOSINOPHIL NFR BLD AUTO: 0.4 %
EOSINOPHILS ABSOLUTE: 0 THOU/MM3 (ref 0–0.4)
ERYTHROCYTE [DISTWIDTH] IN BLOOD BY AUTOMATED COUNT: 13.9 % (ref 11.5–14.5)
GFR SERPL CREATININE-BSD FRML MDRD: > 90 ML/MIN/1.73M2
GLUCOSE SERPL-MCNC: 179 MG/DL (ref 70–108)
HBA1C MFR BLD HPLC: 5.9 % (ref 4.4–6.4)
HCT VFR BLD AUTO: 40 % (ref 37–47)
HDLC SERPL-MCNC: 64 MG/DL
HGB BLD-MCNC: 12.7 GM/DL (ref 12–16)
IMM GRANULOCYTES # BLD AUTO: 0.04 THOU/MM3 (ref 0–0.07)
IMM GRANULOCYTES NFR BLD AUTO: 0.4 %
LDLC SERPL CALC-MCNC: 69 MG/DL
LYMPHOCYTES ABSOLUTE: 2.2 THOU/MM3 (ref 1–4.8)
LYMPHOCYTES NFR BLD AUTO: 24.6 %
MCH RBC QN AUTO: 29.8 PG (ref 26–33)
MCHC RBC AUTO-ENTMCNC: 31.8 GM/DL (ref 32.2–35.5)
MCV RBC AUTO: 93.9 FL (ref 81–99)
MICROALBUMIN UR-MCNC: < 1.2 MG/DL
MICROALBUMIN/CREAT RATIO PNL UR: 7 MG/G (ref 0–30)
MONOCYTES ABSOLUTE: 0.5 THOU/MM3 (ref 0.4–1.3)
MONOCYTES NFR BLD AUTO: 5.5 %
NEUTROPHILS ABSOLUTE: 6.2 THOU/MM3 (ref 1.8–7.7)
NEUTROPHILS NFR BLD AUTO: 68.4 %
NRBC BLD AUTO-RTO: 0 /100 WBC
PLATELET # BLD AUTO: 231 THOU/MM3 (ref 130–400)
PMV BLD AUTO: 12.8 FL (ref 9.4–12.4)
POTASSIUM SERPL-SCNC: 4 MEQ/L (ref 3.5–5.2)
PROT SERPL-MCNC: 7.3 G/DL (ref 6.1–8)
RBC # BLD AUTO: 4.26 MILL/MM3 (ref 4.2–5.4)
SODIUM SERPL-SCNC: 141 MEQ/L (ref 135–145)
TRIGL SERPL-MCNC: 54 MG/DL (ref 0–199)
TSH SERPL DL<=0.005 MIU/L-ACNC: 3.23 UIU/ML (ref 0.4–4.2)
WBC # BLD AUTO: 9 THOU/MM3 (ref 4.8–10.8)

## 2024-12-12 ENCOUNTER — OFFICE VISIT (OUTPATIENT)
Dept: FAMILY MEDICINE CLINIC | Age: 49
End: 2024-12-12

## 2024-12-12 VITALS
DIASTOLIC BLOOD PRESSURE: 72 MMHG | RESPIRATION RATE: 16 BRPM | WEIGHT: 200.2 LBS | HEART RATE: 80 BPM | BODY MASS INDEX: 30.44 KG/M2 | SYSTOLIC BLOOD PRESSURE: 128 MMHG

## 2024-12-12 DIAGNOSIS — E10.65 UNCONTROLLED TYPE 1 DIABETES MELLITUS WITH HYPERGLYCEMIA (HCC): Primary | ICD-10-CM

## 2024-12-12 DIAGNOSIS — E03.9 HYPOTHYROIDISM, UNSPECIFIED TYPE: ICD-10-CM

## 2024-12-12 DIAGNOSIS — Z12.31 ENCOUNTER FOR SCREENING MAMMOGRAM FOR MALIGNANT NEOPLASM OF BREAST: ICD-10-CM

## 2024-12-12 DIAGNOSIS — R80.9 MICROALBUMINURIA: ICD-10-CM

## 2024-12-12 DIAGNOSIS — E78.00 PURE HYPERCHOLESTEROLEMIA: ICD-10-CM

## 2024-12-12 DIAGNOSIS — E66.9 OBESITY (BMI 30-39.9): ICD-10-CM

## 2024-12-12 RX ORDER — INSULIN PMP CART,AUT,G6/7,CNTR
1 EACH SUBCUTANEOUS
Qty: 30 EACH | Refills: 3 | Status: SHIPPED | OUTPATIENT
Start: 2024-12-12

## 2024-12-12 ASSESSMENT — ENCOUNTER SYMPTOMS
RESPIRATORY NEGATIVE: 1
GASTROINTESTINAL NEGATIVE: 1

## 2024-12-12 NOTE — PROGRESS NOTES
Aria Saavedra (:  1975) is a 49 y.o. female,Established patient, here for evaluation of the following chief complaint(s):  6 Month Follow-Up (DM) and Medication Refill          Subjective   SUBJECTIVE/OBJECTIVE:  HPI  Chief Complaint   Patient presents with    6 Month Follow-Up     DM    Medication Refill     6 month eval.    Sugars controlled.    Lab Results   Component Value Date    LABA1C 5.9 2024    LABA1C 6.3 (H) 2024    LABA1C 6.3 2023     Lab Results   Component Value Date    MALBCR 7 2024    CREATININE 0.6 2024     BP Readings from Last 3 Encounters:   24 128/72   24 124/78   24 128/66     Down 42 lbs in the last 6 mos.  Wt Readings from Last 3 Encounters:   24 90.8 kg (200 lb 3.2 oz)   24 97.1 kg (214 lb)   24 110 kg (242 lb 6.4 oz)       Patient Active Problem List   Diagnosis    Diabetes mellitus type 1, uncontrolled    Hypothyroid    Retinopathy of left eye    Microalbuminuria    Chronic constipation    Controlled type 1 diabetes mellitus without complication, with long-term current use of insulin (HCC)    Uncontrolled type 1 diabetes mellitus without complication    Closed fracture of metatarsal bone    Sprain of right foot    Chest pain    Congestion of paranasal sinus    Severe obesity (BMI 35.0-39.9) with comorbidity       Current Outpatient Medications   Medication Sig Dispense Refill    Insulin Disposable Pump (OMNIPOD 5 [id number] PODS GEN 5) MISC 1 each by Does not apply route every 3 days 30 each 3    Tirzepatide-Weight Management (ZEPBOUND) 5 MG/0.5ML SOAJ Inject 5 mg into the skin once a week 6 mL 3    HUMALOG 100 UNIT/ML SOLN injection vial USE AS DIRECTED PER PUMP SETTING. MAX   UNITS DAILY 90 mL 3    Continuous Blood Gluc Transmit (DEXCOM G6 TRANSMITTER) MISC USE AS DIRECTED 2 each 3    vitamin C (ASCORBIC ACID) 500 MG tablet Take 1 tablet by mouth daily      VITAMIN D PO Take 1,000 Units by mouth daily

## 2025-01-07 ENCOUNTER — TELEPHONE (OUTPATIENT)
Dept: FAMILY MEDICINE CLINIC | Age: 50
End: 2025-01-07

## 2025-01-07 NOTE — TELEPHONE ENCOUNTER
Received a PA request from cover my meds in regards to Zepbound 5mg/0.5ml. PA request submitted through Cover My Meds.

## 2025-01-10 NOTE — TELEPHONE ENCOUNTER
Outcome  Approved today by [employer] 2017  CaseId:[id number];Status:Approved;Review Type:Prior Auth;Coverage Start Date:12/08/2024;Coverage End Date:01/09/2026;;CaseId:[id number];Status:Denied;Review Type:Qty;Appeal Information:;  Authorization Expiration Date: 1/8/2026

## 2025-01-10 NOTE — TELEPHONE ENCOUNTER
Call placed to [facility]. Spoke with Dorys. Notified of approval. Call placed to patient.  Patient aware of approval.

## 2025-01-16 DIAGNOSIS — E10.65 UNCONTROLLED TYPE 1 DIABETES MELLITUS WITH HYPERGLYCEMIA (HCC): ICD-10-CM

## 2025-01-16 RX ORDER — INSULIN LISPRO 100 [IU]/ML
INJECTION, SOLUTION INTRAVENOUS; SUBCUTANEOUS
Qty: 110 ML | Refills: 3 | Status: SHIPPED | OUTPATIENT
Start: 2025-01-16

## 2025-03-13 ENCOUNTER — OFFICE VISIT (OUTPATIENT)
Dept: INTERNAL MEDICINE CLINIC | Age: 50
End: 2025-03-13
Payer: COMMERCIAL

## 2025-03-13 DIAGNOSIS — E10.9 TYPE 1 DIABETES MELLITUS WITHOUT COMPLICATION (HCC): ICD-10-CM

## 2025-03-13 DIAGNOSIS — E10.65 UNCONTROLLED TYPE 1 DIABETES MELLITUS WITH HYPERGLYCEMIA (HCC): ICD-10-CM

## 2025-03-13 DIAGNOSIS — E10.65 UNCONTROLLED TYPE 1 DIABETES MELLITUS WITH HYPERGLYCEMIA (HCC): Primary | ICD-10-CM

## 2025-03-13 LAB — HBA1C MFR BLD: 6.4 % (ref 4.3–5.7)

## 2025-03-13 PROCEDURE — G0108 DIAB MANAGE TRN  PER INDIV: HCPCS | Performed by: PHARMACIST

## 2025-03-13 PROCEDURE — 83036 HEMOGLOBIN GLYCOSYLATED A1C: CPT | Performed by: PHARMACIST

## 2025-03-13 PROCEDURE — NBSRV NON-BILLABLE SERVICE: Performed by: PHARMACIST

## 2025-03-13 RX ORDER — INSULIN LISPRO 100 [IU]/ML
INJECTION, SOLUTION INTRAVENOUS; SUBCUTANEOUS
Qty: 50 ML | Refills: 3 | Status: SHIPPED | OUTPATIENT
Start: 2025-03-13

## 2025-03-13 NOTE — PROGRESS NOTES
[facility]  [address] [place] [address]  [phone] (phone)  [phone] (fax)    Patient ID: Aria Saavedra 1975  Referring Provider: Dr. Kearney     Patient's name and  were verified.    Subjective:    She presents for a follow-up diabetic visit. She has type 1 diabetes mellitus. She is compliant a majority of the time.  Assessment:     Lab Results   Component Value Date/Time    LABA1C 6.4 2025 07:25 AM    LABA1C 5.9 2024 12:15 PM    BUN 15 2024 12:14 PM    CREATININE 0.6 2024 12:14 PM     There were no vitals filed for this visit.  Wt Readings from Last 3 Encounters:   24 90.8 kg (200 lb 3.2 oz)   24 97.1 kg (214 lb)   24 110 kg (242 lb 6.4 oz)     Ht Readings from Last 3 Encounters:   24 1.727 m (5' 8\")   24 1.727 m (5' 8\")   10/03/23 1.727 m (5' 8\")     Est, Glom Filt Rate   Date Value Ref Range Status   2024 > 90 >60 ml/min/1.73m2 Final       Diabetes Pharmacotherapy:  Humalog insulin per Omnipod 5 pump - Uses Automated Mode  Zepbound  5mg weekly; Monday    Glucose Trends:   Glucose at 45 min PPD today resulted at 209 mg/dl  Current monitoring regimen: Dexcom CGM - checks 4+ times daily  Home blood sugar trends:    -V. High: 4%, High: 20%, Target: 73%, Low: 2%, V. Low: 1%   -Average Glucose: 150mg/dL; GMI: 6.9%;  %time CGM active 91%              -Mild hyperglycemia after breakfast/supper  Any episodes of hypoglycemia? yes - 1-2x weekly or less    Lifestyle Factors:   Previous visit with dietician: yes -   Current diet: B: Premier protein drink            L: take out more often at work lately OR lighter meal - yogurt                       D: protein/starch/non-starchy veggie (15-20g carb)                       Snacks: limited                       Beverages: mostly water  Current exercise:  ADLs; some walks after work    Health Maintenance:  Diabetes Management   Topic Date Due    Diabetic retinal exam  2024

## 2025-03-13 NOTE — TELEPHONE ENCOUNTER
Aria Toscano needs and updated insulin prescription to match her decreased insulin needs. She is currently getting too many vials at once and is concerned about them expiring.     See pended Rx.     Please message back and/or review pended order(s) in response to the above.    Thank you for allowing me to participate in the care of your patient,     Latasha Olmstead, Gerardo, BCPS, [facility]  Internal Medicine Clinical Pharmacist  [phone]

## 2025-03-13 NOTE — PATIENT INSTRUCTIONS
Try filling your pods with 100-125 units   Try bolusing 5-10 grams for your morning coffee (due to caffeine)  Click \"Use sensor glucose\" under the Bolus feature for corrections  Try to give an earlier supper bolus if sugars are stable

## 2025-06-11 ENCOUNTER — OFFICE VISIT (OUTPATIENT)
Dept: FAMILY MEDICINE CLINIC | Age: 50
End: 2025-06-11

## 2025-06-11 VITALS
WEIGHT: 173.5 LBS | HEART RATE: 64 BPM | RESPIRATION RATE: 16 BRPM | DIASTOLIC BLOOD PRESSURE: 64 MMHG | BODY MASS INDEX: 26.38 KG/M2 | SYSTOLIC BLOOD PRESSURE: 118 MMHG

## 2025-06-11 DIAGNOSIS — E03.9 HYPOTHYROIDISM, UNSPECIFIED TYPE: ICD-10-CM

## 2025-06-11 DIAGNOSIS — R80.9 MICROALBUMINURIA: ICD-10-CM

## 2025-06-11 DIAGNOSIS — E10.9 CONTROLLED DIABETES MELLITUS TYPE 1 WITHOUT COMPLICATIONS (HCC): Primary | ICD-10-CM

## 2025-06-11 DIAGNOSIS — Z51.81 MEDICATION MONITORING ENCOUNTER: ICD-10-CM

## 2025-06-11 DIAGNOSIS — E78.00 PURE HYPERCHOLESTEROLEMIA: ICD-10-CM

## 2025-06-11 DIAGNOSIS — E66.3 OVERWEIGHT (BMI 25.0-29.9): ICD-10-CM

## 2025-06-11 SDOH — ECONOMIC STABILITY: FOOD INSECURITY: WITHIN THE PAST 12 MONTHS, THE FOOD YOU BOUGHT JUST DIDN'T LAST AND YOU DIDN'T HAVE MONEY TO GET MORE.: NEVER TRUE

## 2025-06-11 SDOH — ECONOMIC STABILITY: FOOD INSECURITY: WITHIN THE PAST 12 MONTHS, YOU WORRIED THAT YOUR FOOD WOULD RUN OUT BEFORE YOU GOT MONEY TO BUY MORE.: NEVER TRUE

## 2025-06-11 ASSESSMENT — PATIENT HEALTH QUESTIONNAIRE - PHQ9
SUM OF ALL RESPONSES TO PHQ QUESTIONS 1-9: 0
2. FEELING DOWN, DEPRESSED OR HOPELESS: NOT AT ALL
SUM OF ALL RESPONSES TO PHQ QUESTIONS 1-9: 0
1. LITTLE INTEREST OR PLEASURE IN DOING THINGS: NOT AT ALL
SUM OF ALL RESPONSES TO PHQ QUESTIONS 1-9: 0
SUM OF ALL RESPONSES TO PHQ QUESTIONS 1-9: 0

## 2025-06-11 ASSESSMENT — ENCOUNTER SYMPTOMS
GASTROINTESTINAL NEGATIVE: 1
RESPIRATORY NEGATIVE: 1

## 2025-06-11 NOTE — PROGRESS NOTES
Aria Saavedra (:  1975) is a 50 y.o. female,Established patient, here for evaluation of the following chief complaint(s):  3 Month Follow-Up and Diabetes          Subjective   SUBJECTIVE/OBJECTIVE:  HPI  Chief Complaint   Patient presents with    3 Month Follow-Up    Diabetes     6 month eval.    Lab Results   Component Value Date    LABA1C 6.4 (H) 2025    LABA1C 5.9 2024    LABA1C 6.3 (H) 2024     Lab Results   Component Value Date    MALBCR 9 2023    CREATININE 0.6 2024     BP Readings from Last 3 Encounters:   25 118/64   24 128/72   24 124/78     Wt Readings from Last 3 Encounters:   25 78.7 kg (173 lb 8 oz)   24 90.8 kg (200 lb 3.2 oz)   24 97.1 kg (214 lb)       Patient Active Problem List   Diagnosis    Diabetes mellitus type 1, uncontrolled    Hypothyroid    Retinopathy of left eye    Microalbuminuria    Chronic constipation    Controlled type 1 diabetes mellitus without complication, with long-term current use of insulin (HCC)    Uncontrolled type 1 diabetes mellitus without complication    Closed fracture of metatarsal bone    Sprain of right foot    Chest pain    Congestion of paranasal sinus    Severe obesity (BMI 35.0-39.9) with comorbidity (HCC)       Current Outpatient Medications   Medication Sig Dispense Refill    HUMALOG 100 UNIT/ML SOLN injection vial USE AS DIRECTED PER PUMP SETTING, MAXIMUM OF 55 UNITS DAILY *Dose change* 50 mL 3    Insulin Disposable Pump (OMNIPOD 5 [id number] PODS GEN 5) MISC 1 each by Does not apply route every 3 days 30 each 3    Tirzepatide-Weight Management (ZEPBOUND) 5 MG/0.5ML SOAJ Inject 5 mg into the skin once a week 6 mL 3    Continuous Blood Gluc Transmit (DEXCOM G6 TRANSMITTER) MISC USE AS DIRECTED 2 each 3    vitamin C (ASCORBIC ACID) 500 MG tablet Take 1 tablet by mouth daily      VITAMIN D PO Take 1,000 Units by mouth daily      zinc 50 MG TABS tablet Take 1 tablet by mouth daily

## 2025-07-29 RX ORDER — TIRZEPATIDE 5 MG/.5ML
INJECTION, SOLUTION SUBCUTANEOUS
Qty: 12 ML | Refills: 0 | Status: SHIPPED | OUTPATIENT
Start: 2025-07-29

## 2025-08-25 ENCOUNTER — PATIENT MESSAGE (OUTPATIENT)
Dept: INTERNAL MEDICINE CLINIC | Age: 50
End: 2025-08-25

## 2025-08-26 ENCOUNTER — TELEPHONE (OUTPATIENT)
Dept: INTERNAL MEDICINE CLINIC | Age: 50
End: 2025-08-26

## 2025-08-26 ENCOUNTER — PATIENT MESSAGE (OUTPATIENT)
Dept: INTERNAL MEDICINE CLINIC | Age: 50
End: 2025-08-26